# Patient Record
Sex: MALE | Race: WHITE | NOT HISPANIC OR LATINO | Employment: OTHER | URBAN - METROPOLITAN AREA
[De-identification: names, ages, dates, MRNs, and addresses within clinical notes are randomized per-mention and may not be internally consistent; named-entity substitution may affect disease eponyms.]

---

## 2017-02-14 ENCOUNTER — ALLSCRIPTS OFFICE VISIT (OUTPATIENT)
Dept: OTHER | Facility: OTHER | Age: 52
End: 2017-02-14

## 2017-02-14 DIAGNOSIS — E78.5 HYPERLIPIDEMIA: ICD-10-CM

## 2017-06-08 ENCOUNTER — APPOINTMENT (EMERGENCY)
Dept: RADIOLOGY | Facility: HOSPITAL | Age: 52
End: 2017-06-08
Payer: COMMERCIAL

## 2017-06-08 ENCOUNTER — HOSPITAL ENCOUNTER (EMERGENCY)
Facility: HOSPITAL | Age: 52
Discharge: LEFT BEFORE DISCHARGE | End: 2017-06-08
Attending: EMERGENCY MEDICINE
Payer: COMMERCIAL

## 2017-06-08 ENCOUNTER — GENERIC CONVERSION - ENCOUNTER (OUTPATIENT)
Dept: OTHER | Facility: OTHER | Age: 52
End: 2017-06-08

## 2017-06-08 VITALS
BODY MASS INDEX: 22.95 KG/M2 | DIASTOLIC BLOOD PRESSURE: 68 MMHG | RESPIRATION RATE: 18 BRPM | HEART RATE: 92 BPM | TEMPERATURE: 98.3 F | WEIGHT: 155.4 LBS | SYSTOLIC BLOOD PRESSURE: 130 MMHG | OXYGEN SATURATION: 98 %

## 2017-06-08 DIAGNOSIS — I20.0 UNSTABLE ANGINA (HCC): Primary | ICD-10-CM

## 2017-06-08 DIAGNOSIS — R07.9 CHEST PAIN: ICD-10-CM

## 2017-06-08 LAB
ANION GAP SERPL CALCULATED.3IONS-SCNC: 10 MMOL/L (ref 4–13)
APTT PPP: 31 SECONDS (ref 23–35)
ATRIAL RATE: 82 BPM
BASOPHILS # BLD AUTO: 0.02 THOUSANDS/ΜL (ref 0–0.1)
BASOPHILS NFR BLD AUTO: 0 % (ref 0–1)
BUN SERPL-MCNC: 14 MG/DL (ref 5–25)
CALCIUM SERPL-MCNC: 9.1 MG/DL (ref 8.3–10.1)
CHLORIDE SERPL-SCNC: 104 MMOL/L (ref 100–108)
CO2 SERPL-SCNC: 28 MMOL/L (ref 21–32)
CREAT SERPL-MCNC: 0.98 MG/DL (ref 0.6–1.3)
EOSINOPHIL # BLD AUTO: 0.12 THOUSAND/ΜL (ref 0–0.61)
EOSINOPHIL NFR BLD AUTO: 2 % (ref 0–6)
ERYTHROCYTE [DISTWIDTH] IN BLOOD BY AUTOMATED COUNT: 12.5 % (ref 11.6–15.1)
GFR SERPL CREATININE-BSD FRML MDRD: >60 ML/MIN/1.73SQ M
GLUCOSE SERPL-MCNC: 104 MG/DL (ref 65–140)
HCT VFR BLD AUTO: 45.8 % (ref 36.5–49.3)
HGB BLD-MCNC: 16.6 G/DL (ref 12–17)
INR PPP: 1.11 (ref 0.86–1.16)
LYMPHOCYTES # BLD AUTO: 1.53 THOUSANDS/ΜL (ref 0.6–4.47)
LYMPHOCYTES NFR BLD AUTO: 27 % (ref 14–44)
MCH RBC QN AUTO: 32.1 PG (ref 26.8–34.3)
MCHC RBC AUTO-ENTMCNC: 36.2 G/DL (ref 31.4–37.4)
MCV RBC AUTO: 89 FL (ref 82–98)
MONOCYTES # BLD AUTO: 0.5 THOUSAND/ΜL (ref 0.17–1.22)
MONOCYTES NFR BLD AUTO: 9 % (ref 4–12)
NEUTROPHILS # BLD AUTO: 3.41 THOUSANDS/ΜL (ref 1.85–7.62)
NEUTS SEG NFR BLD AUTO: 62 % (ref 43–75)
NRBC BLD AUTO-RTO: 0 /100 WBCS
P AXIS: 79 DEGREES
PLATELET # BLD AUTO: 159 THOUSANDS/UL (ref 149–390)
PMV BLD AUTO: 11 FL (ref 8.9–12.7)
POTASSIUM SERPL-SCNC: 4.3 MMOL/L (ref 3.5–5.3)
PR INTERVAL: 160 MS
PROTHROMBIN TIME: 14.3 SECONDS (ref 12.1–14.4)
QRS AXIS: -5 DEGREES
QRSD INTERVAL: 78 MS
QT INTERVAL: 346 MS
QTC INTERVAL: 404 MS
RBC # BLD AUTO: 5.17 MILLION/UL (ref 3.88–5.62)
SODIUM SERPL-SCNC: 142 MMOL/L (ref 136–145)
SPECIMEN SOURCE: NORMAL
T WAVE AXIS: 84 DEGREES
TROPONIN I BLD-MCNC: 0 NG/ML (ref 0–0.08)
VENTRICULAR RATE: 82 BPM
WBC # BLD AUTO: 5.58 THOUSAND/UL (ref 4.31–10.16)

## 2017-06-08 PROCEDURE — 85610 PROTHROMBIN TIME: CPT | Performed by: EMERGENCY MEDICINE

## 2017-06-08 PROCEDURE — 36415 COLL VENOUS BLD VENIPUNCTURE: CPT

## 2017-06-08 PROCEDURE — 99285 EMERGENCY DEPT VISIT HI MDM: CPT

## 2017-06-08 PROCEDURE — 71020 HB CHEST X-RAY 2VW FRONTAL&LATL: CPT

## 2017-06-08 PROCEDURE — 93005 ELECTROCARDIOGRAM TRACING: CPT

## 2017-06-08 PROCEDURE — 85730 THROMBOPLASTIN TIME PARTIAL: CPT | Performed by: EMERGENCY MEDICINE

## 2017-06-08 PROCEDURE — 80048 BASIC METABOLIC PNL TOTAL CA: CPT

## 2017-06-08 PROCEDURE — 84484 ASSAY OF TROPONIN QUANT: CPT

## 2017-06-08 PROCEDURE — 85025 COMPLETE CBC W/AUTO DIFF WBC: CPT

## 2017-08-22 ENCOUNTER — ALLSCRIPTS OFFICE VISIT (OUTPATIENT)
Dept: OTHER | Facility: OTHER | Age: 52
End: 2017-08-22

## 2018-01-10 NOTE — RESULT NOTES
Verified Results  NM MYOCARDIAL PERFUSION SPECT (RX STRESS AND/OR REST) [id number] 02:56PM Lisy Poag     Test Name Result Flag Reference   NM MYOCARDIAL PERFUSION SPECT (RX STRESS AND/OR REST) (Report)     Kamila Floreso [address]  [place], [address]   [phone]     Rest/Stress Gated SPECT Myocardial Perfusion Imaging After Regadenoson     Study date: 2016     Patient: Carmela Valverde   MR number: [id number]   Account number: [de-identified]   : 1965   Age: 44 years   Gender: Male   Study date: 2016   Status: Outpatient   Location: Stress lab 139 100 mmHg 81 bpm mmHg   Height: 67 in   Weight: 153 lb   BP: /     Indications: Evaluation of known coronary artery disease  Diagnosis: R07 89 - Other chest pain     Primary Physician: Pamela Anders MD   RN: Emmett Gonzales RN BSN   Referring Physician: Gianni Iraheta MD   Group: [facility]   Report Prepared By[de-identified] Emmett Gonzales RN BSN   Interpreting Physician: Sarah Rick DO     IMPRESSIONS:   Abnormal study after pharmacologic vasodilation  There was evidence of prior   myocardial infarction of the apical anterior wall and the true apex with mild   breann-infarct ischemia of the apical anterior myocardial wall segment  Overall   left ventricular systolic function was preserved, with moderate-severe apical   anterior hypokinesis  SUMMARY     ECG CONCLUSIONS:   The stress ECG was negative for ischemia  PERFUSION IMAGING:   There was a small, severe, myocardial perfusion defect of the apical anterior   wall and the true apex, with mild reversibility of the apical anterior wall  GATED SPECT:   The calculated left ventricular ejection fraction was 46 %  Left ventricular ejection fraction was within normal limits by visual estimate  There was moderate to severely reduced myocardial thickening and motion of the   apical anterior wall of the left ventricle       HISTORY: The patient is a 59year old [facility] male  Chest pain status:   consistent with atypical angina, associated with dyspnea  Other symptoms:   palpitations  Coronary artery disease risk factors: dyslipidemia, hypertension,   former smoking, and family history of premature coronary artery disease  Cardiovascular history: coronary artery disease, prior myocardial infarction,   and congestive heart failure  Prior cardiovascular procedures: percutaneous   transluminal coronary angioplasty (on 2009)  Medications: an ACE inhibitor/ARB,   aspirin, and a lipid lowering agent  Previous test results: abnormal resting   echocardiogram      PHYSICAL EXAM: Baseline physical exam screening: normal lung exam      REST ECG: Normal sinus rhythm  Nonspecific ST and T wave abnormalities were   present  PROCEDURE: The study was performed in the stress lab  A regadenoson infusion   pharmacologic stress test was performed  Gated SPECT myocardial perfusion   imaging was performed after stress and at rest  Systolic blood pressure was 139   mmHg, at the start of the study  Diastolic blood pressure was 100 mmHg, at the   start of the study  The heart rate was 81 bpm, at the start of the study  IV   double checked  The patient also performed low level exercise  MEDICATIONS GIVEN: No medications or fluids given  STRESS RESULTS:     ECG CONCLUSIONS: The stress ECG was negative for ischemia  Arrhythmia during   stress: isolated premature ventricular beats  ISOTOPE ADMINISTRATION:   Resting isotope administration Stress isotope administration   Agent Tetrofosmin Tetrofosmin   Dose 29 8 mCi 30 2 mCi   Date 09-Feb-2016 09-Feb-2016   Injection time 13:36 10:45   Imaging time 14:13 11:15   Injection-image interval 37 min 30 min     The radiopharmaceutical was injected at the peak effect of pharmacologic   stress  MYOCARDIAL PERFUSION IMAGING:   The image quality was good   Left ventricular size was normal  The TID ratio was 1  12  !!ERROR!! End tag does not match the start tag 'DIV'  Error at or before   end tag  -  There was a small, severe, myocardial perfusion defect of the   apical anterior wall and the true apex, with mild reversibility of the apical   anterior wall  GATED SPECT:   The calculated left ventricular ejection fraction was 46 %  Left ventricular   ejection fraction was within normal limits by visual estimate  There was   moderate to severely reduced myocardial thickening and motion of the apical   anterior wall of the left ventricle       Prepared and electronically signed by     Florian Carr DO   Signed 09-Feb-2016 10:01:45

## 2018-01-13 VITALS
HEART RATE: 80 BPM | RESPIRATION RATE: 16 BRPM | WEIGHT: 154.25 LBS | BODY MASS INDEX: 24.21 KG/M2 | DIASTOLIC BLOOD PRESSURE: 70 MMHG | HEIGHT: 67 IN | SYSTOLIC BLOOD PRESSURE: 108 MMHG

## 2018-01-13 VITALS
SYSTOLIC BLOOD PRESSURE: 118 MMHG | DIASTOLIC BLOOD PRESSURE: 72 MMHG | HEIGHT: 67 IN | WEIGHT: 158 LBS | BODY MASS INDEX: 24.8 KG/M2 | HEART RATE: 82 BPM

## 2018-02-20 ENCOUNTER — OFFICE VISIT (OUTPATIENT)
Dept: CARDIOLOGY CLINIC | Facility: CLINIC | Age: 53
End: 2018-02-20
Payer: COMMERCIAL

## 2018-02-20 VITALS
DIASTOLIC BLOOD PRESSURE: 88 MMHG | SYSTOLIC BLOOD PRESSURE: 114 MMHG | WEIGHT: 156 LBS | BODY MASS INDEX: 21.84 KG/M2 | HEART RATE: 80 BPM | HEIGHT: 71 IN

## 2018-02-20 DIAGNOSIS — I10 ESSENTIAL HYPERTENSION: ICD-10-CM

## 2018-02-20 DIAGNOSIS — I25.2 OLD MYOCARDIAL INFARCT: ICD-10-CM

## 2018-02-20 DIAGNOSIS — I25.10 CORONARY ARTERY DISEASE INVOLVING NATIVE CORONARY ARTERY OF NATIVE HEART WITHOUT ANGINA PECTORIS: Primary | ICD-10-CM

## 2018-02-20 DIAGNOSIS — E78.2 MIXED HYPERLIPIDEMIA: ICD-10-CM

## 2018-02-20 PROCEDURE — 99213 OFFICE O/P EST LOW 20 MIN: CPT | Performed by: INTERNAL MEDICINE

## 2018-02-20 RX ORDER — EZETIMIBE 10 MG/1
TABLET ORAL
COMMUNITY
Start: 2018-01-21 | End: 2018-12-17 | Stop reason: SDUPTHER

## 2018-02-20 RX ORDER — QUETIAPINE FUMARATE 100 MG/1
100 TABLET, FILM COATED ORAL
COMMUNITY
Start: 2017-11-09 | End: 2018-02-20 | Stop reason: SDUPTHER

## 2018-02-20 RX ORDER — VILAZODONE HYDROCHLORIDE 40 MG/1
40 TABLET ORAL
COMMUNITY
Start: 2018-01-22

## 2018-02-20 NOTE — PROGRESS NOTES
Cardiology Follow Up    [facility]  1965  [id number]  [address] [place] [address] [address] [place] [address]    Reason for visit:  Six-month follow-up for CAD status post anterior MI with stenting of the left anterior descending artery in 2009  Patient also has hypertension and hyperlipidemia  1  Coronary artery disease involving native coronary artery of native heart without angina pectoris     2  Essential hypertension     3  Old myocardial infarct     4  Mixed hyperlipidemia         Interval History: He continues to get vague chest pain about 2x per month unrelated to activity  He has ongoing SANDHU which is about the same  He denies palpitation or edema  He does get occasional dyspnea  Patient Active Problem List   Diagnosis    Hypertension    Old myocardial infarct    Depressed    CAD (coronary artery disease)    Anxiety    Chest pain    Mixed hyperlipidemia     Past Medical History:   Diagnosis Date    Anxiety     CAD (coronary artery disease)     Depressed     Hypertension     MI (myocardial infarction)     2009     Social History     Social History    Marital status: /Civil Union     Spouse name: N/A    Number of children: N/A    Years of education: N/A     Occupational History    Not on file  Social History Main Topics    Smoking status: Former Smoker    Smokeless tobacco: Not on file      Comment: Cess x 6 years    Alcohol use No    Drug use: No    Sexual activity: Not on file     Other Topics Concern    Not on file     Social History Narrative    No narrative on file      Family History   Problem Relation Age of Onset    Heart attack Father     Heart disease Brother      Past Surgical History:   Procedure Laterality Date    ANGIOPLASTY  2009     LAD       Current Outpatient Prescriptions:     aspirin 81 MG tablet, Take 81 mg by mouth daily  , Disp: , Rfl:     atorvastatin (LIPITOR) 40 mg tablet, Take 40 mg by mouth , Disp: , Rfl:     gabapentin (NEURONTIN) 600 MG tablet, Take 600 mg by mouth 3 (three) times a day , Disp: , Rfl:     lisinopril (ZESTRIL) 40 mg tablet, Take 40 mg by mouth 2 (two) times a day , Disp: , Rfl:     LORazepam (ATIVAN) 1 mg tablet, Take 1 mg by mouth every 6 (six) hours as needed for anxiety  , Disp: , Rfl:     methocarbamol (ROBAXIN) 750 mg tablet, Take 750 mg by mouth 3 (three) times a day , Disp: , Rfl:     mirtazapine (REMERON) 45 MG tablet, Take 45 mg by mouth daily at bedtime  , Disp: , Rfl:     nitroglycerin (NITRO-TIME) 2 5 mg CR capsule, Take by mouth continuous as needed  , Disp: , Rfl:     omeprazole (PriLOSEC) 20 mg delayed release capsule, Take 20 mg by mouth 2 (two) times a day , Disp: , Rfl:     QUEtiapine (SEROquel) 100 mg tablet, Take 100 mg by mouth daily at bedtime  , Disp: , Rfl:     rOPINIRole (REQUIP) 1 mg tablet, Take 1 mg by mouth daily  , Disp: , Rfl:     traMADol (ULTRAM) 50 mg tablet, Take 50 mg by mouth 2 (two) times a day , Disp: , Rfl:     triamcinolone (KENALOG) 0 1 % cream, Apply topically 2 (two) times a day , Disp: , Rfl:     VIIBRYD 40 MG tablet, , Disp: , Rfl:     ZETIA 10 MG tablet, , Disp: , Rfl:   No Known Allergies    Review of Systems:  Review of Systems   Constitutional: Positive for fatigue  Respiratory: Positive for shortness of breath  Cardiovascular: Positive for chest pain  Negative for palpitations and leg swelling  Genitourinary: Negative for frequency  Musculoskeletal: Positive for arthralgias and back pain  Neurological: Positive for light-headedness  Physical Exam:  Vitals:    02/20/18 1604   BP: 114/88   BP Location: Left arm   Patient Position: Sitting   Cuff Size: Adult   Pulse: 80   Weight: 70 8 kg (156 lb)   Height: 5' 11" (1 803 m)     Physical Exam   Constitutional: He appears well-developed and well-nourished  No distress  HENT:   Head: Normocephalic and atraumatic     Mouth/Throat: Oropharynx is clear and moist    Eyes: Conjunctivae are normal  No scleral icterus  Neck: Neck supple  Normal carotid pulses and no JVD present  Carotid bruit is not present  No thyromegaly present  Cardiovascular: Normal rate and regular rhythm  Exam reveals no gallop and no friction rub  No murmur heard  Pulmonary/Chest: He has no wheezes  He has no rhonchi  He has no rales  Abdominal: Soft  He exhibits no mass  There is no hepatosplenomegaly  There is no tenderness  Musculoskeletal: He exhibits no edema  Discussion/Summary:  1  CAD status post remote apical myocardial infarction with residual ejection fraction of 45%  Stenting of the left anterior descending artery at that time  Negative Myoview stress test in 2016  No evidence for angina  Chest pain is clearly noncardiac  Continue aspirin long term  No beta blocker due to depression  2  Hypertension  Well controlled on lisinopril  3  Old myocardial infarction  See above comments  Near normal ejection fraction  No evidence for heart failure  4  Mixed hyperlipidemia  On atorvastatin and ezetimibe  LDL cholesterol about 70 on this regimen  Continue same      Follow-up 6 months    Stress testing 1 year     Elisabeth Liang MD

## 2018-08-21 ENCOUNTER — OFFICE VISIT (OUTPATIENT)
Dept: CARDIOLOGY CLINIC | Facility: CLINIC | Age: 53
End: 2018-08-21
Payer: COMMERCIAL

## 2018-08-21 VITALS
DIASTOLIC BLOOD PRESSURE: 72 MMHG | SYSTOLIC BLOOD PRESSURE: 128 MMHG | RESPIRATION RATE: 16 BRPM | WEIGHT: 157 LBS | HEART RATE: 82 BPM | HEIGHT: 70 IN | BODY MASS INDEX: 22.48 KG/M2

## 2018-08-21 DIAGNOSIS — E78.2 MIXED HYPERLIPIDEMIA: ICD-10-CM

## 2018-08-21 DIAGNOSIS — I25.10 CORONARY ARTERY DISEASE INVOLVING NATIVE HEART, ANGINA PRESENCE UNSPECIFIED, UNSPECIFIED VESSEL OR LESION TYPE: ICD-10-CM

## 2018-08-21 DIAGNOSIS — I25.10 CORONARY ARTERY DISEASE INVOLVING NATIVE HEART, ANGINA PRESENCE UNSPECIFIED, UNSPECIFIED VESSEL OR LESION TYPE: Primary | ICD-10-CM

## 2018-08-21 DIAGNOSIS — I10 ESSENTIAL HYPERTENSION: ICD-10-CM

## 2018-08-21 PROCEDURE — 99213 OFFICE O/P EST LOW 20 MIN: CPT | Performed by: INTERNAL MEDICINE

## 2018-08-21 PROCEDURE — 93000 ELECTROCARDIOGRAM COMPLETE: CPT | Performed by: INTERNAL MEDICINE

## 2018-08-21 RX ORDER — NITROGLYCERIN 0.4 MG/1
0.4 TABLET SUBLINGUAL
Qty: 25 TABLET | Refills: 3 | Status: SHIPPED | OUTPATIENT
Start: 2018-08-21 | End: 2019-11-19 | Stop reason: SDUPTHER

## 2018-08-21 NOTE — PROGRESS NOTES
Cardiology Follow Up    [facility]  1965  [id number]  [address] [facility] [id number] [phone]    Reason for visit:  Six-month follow-up for CAD status post anterior myocardial infarction with stenting of the left anterior descending artery in 2009  Also has hypertension and hyperlipidemia      1  Coronary artery disease involving native heart, angina presence unspecified, unspecified vessel or lesion type  POCT ECG   2  Essential hypertension     3  Mixed hyperlipidemia         Interval History: Since his last visit he does get the random atypical chest pain at times  This is not related to activity  It occurs about 1-2x per month and lasts 15 minutes  It does not occur with exertion  He does have ongoing SANDHU which is unchanged  He denies palpitations or edema  He does get some dizziness at times    Patient Active Problem List   Diagnosis    Hypertension    Old myocardial infarct    Depressed    CAD (coronary artery disease)    Anxiety    Chest pain    Mixed hyperlipidemia     Past Medical History:   Diagnosis Date    Anxiety     CAD (coronary artery disease)     Depressed     Hypertension     MI [facility]myocardial infarction) ([facility] [address] )     2009     Social History     Social History    Marital status: /Civil Union     Spouse name: N/A    Number of children: N/A    Years of education: N/A     Occupational History    Not on file       Social History Main Topics    Smoking status: Former Smoker    Smokeless tobacco: Never Used      Comment: Cess x 6 years    Alcohol use No    Drug use: No    Sexual activity: Not on file     Other Topics Concern    Not on file     Social History Narrative    No narrative on file      Family History   Problem Relation Age of Onset    Heart attack Father     Heart disease Brother      Past Surgical History:   Procedure Laterality Date    ANGIOPLASTY  2009 LAD       Current Outpatient Prescriptions:     aspirin 81 MG tablet, Take 81 mg by mouth daily  , Disp: , Rfl:     atorvastatin (LIPITOR) 40 mg tablet, Take 40 mg by mouth , Disp: , Rfl:     gabapentin (NEURONTIN) 600 MG tablet, Take 600 mg by mouth 3 (three) times a day , Disp: , Rfl:     lisinopril (ZESTRIL) 40 mg tablet, Take 40 mg by mouth 2 (two) times a day , Disp: , Rfl:     LORazepam (ATIVAN) 1 mg tablet, Take 1 mg by mouth every 6 (six) hours as needed for anxiety  , Disp: , Rfl:     methocarbamol (ROBAXIN) 750 mg tablet, Take 750 mg by mouth 3 (three) times a day , Disp: , Rfl:     mirtazapine (REMERON) 45 MG tablet, Take 45 mg by mouth daily at bedtime  , Disp: , Rfl:     nitroglycerin (NITRO-TIME) 2 5 mg CR capsule, Take by mouth continuous as needed  , Disp: , Rfl:     omeprazole (PriLOSEC) 20 mg delayed release capsule, Take 20 mg by mouth 2 (two) times a day , Disp: , Rfl:     QUEtiapine (SEROquel) 100 mg tablet, Take 100 mg by mouth daily at bedtime  , Disp: , Rfl:     rOPINIRole (REQUIP) 1 mg tablet, Take 1 mg by mouth daily  , Disp: , Rfl:     traMADol (ULTRAM) 50 mg tablet, Take 50 mg by mouth 2 (two) times a day , Disp: , Rfl:     triamcinolone (KENALOG) 0 1 % cream, Apply topically 2 (two) times a day , Disp: , Rfl:     VIIBRYD 40 MG tablet, , Disp: , Rfl:     ZETIA 10 MG tablet, , Disp: , Rfl:   No Known Allergies      Review of Systems:  Review of Systems   Constitutional: Positive for fatigue  Negative for appetite change and unexpected weight change  Respiratory: Positive for shortness of breath  Negative for cough and wheezing  Cardiovascular: Positive for chest pain  Negative for palpitations and leg swelling  Gastrointestinal: Positive for diarrhea (from fish oil    now stopped)  Negative for constipation  Genitourinary: Negative for dysuria and frequency  Musculoskeletal: Positive for arthralgias and back pain  Neurological: Positive for light-headedness  Physical Exam:  Vitals:    08/21/18 1600   BP: 128/72   BP Location: Right arm   Patient Position: Sitting   Cuff Size: Standard   Pulse: 82   Resp: 16   Weight: 71 2 kg (157 lb)   Height: 5' 10" (1 778 m)       Physical Exam   Constitutional: He appears well-developed and well-nourished  No distress  HENT:   Head: Normocephalic and atraumatic  Mouth/Throat: Oropharynx is clear and moist    Eyes: Conjunctivae are normal  No scleral icterus  Neck: Neck supple  Normal carotid pulses and no JVD present  Carotid bruit is not present  No thyromegaly present  Cardiovascular: Normal rate and regular rhythm  Exam reveals no gallop and no friction rub  No murmur heard  Pulmonary/Chest: He has no wheezes  He has no rhonchi  He has no rales  Abdominal: Soft  He exhibits no mass  There is no hepatosplenomegaly  There is no tenderness  Musculoskeletal: He exhibits no edema  Discussion/Summary:  1  CAD  Status post remote myocardial infarction in 2009 with stenting of the left anterior descending artery  Having no angina  Chest pain reported clearly atypical and not angina  Continue  Aspirin going forward  No beta-blocker on board despite modest scarring due to hx of depression  Check [place] Myoview in 6  Months-3 yrs out from last test  2  Hypertension -well controlled on monotherapy with lisinopril  Continue same  3  Mixed hyperlipidemia  LDL cholesterol excellent at 55 with low HDL cholesterol of 22  Continue atorvastatin and ezetimibe combination  Patient did have rather high triglycerides at 384 which is unusual for him  Note he does have some glucose intolerance and should do better with observe ends of a low starch/ no concentrated sweets diet  He did try fish oil but had diarrhea and GI upset from 1-2 tablets daily  If this elevation persist would consider adding a fibrate such as fenofibrate  At a low dose    Phone FU after stress test in 6 months      Sushma Higgins MD      2

## 2018-08-22 RX ORDER — NITROGLYCERIN 0.4 MG/1
TABLET SUBLINGUAL
Qty: 75 TABLET | Refills: 3 | OUTPATIENT
Start: 2018-08-22

## 2018-10-29 ENCOUNTER — HOSPITAL ENCOUNTER (EMERGENCY)
Facility: HOSPITAL | Age: 53
Discharge: HOME | End: 2018-10-29
Attending: EMERGENCY MEDICINE | Admitting: EMERGENCY MEDICINE
Payer: COMMERCIAL

## 2018-10-29 ENCOUNTER — APPOINTMENT (EMERGENCY)
Dept: RADIOLOGY | Facility: HOSPITAL | Age: 53
End: 2018-10-29
Payer: COMMERCIAL

## 2018-10-29 ENCOUNTER — APPOINTMENT (EMERGENCY)
Dept: ULTRASOUND IMAGING | Facility: HOSPITAL | Age: 53
End: 2018-10-29
Payer: COMMERCIAL

## 2018-10-29 VITALS
RESPIRATION RATE: 16 BRPM | TEMPERATURE: 99.3 F | WEIGHT: 157 LBS | OXYGEN SATURATION: 98 % | DIASTOLIC BLOOD PRESSURE: 79 MMHG | HEART RATE: 82 BPM | SYSTOLIC BLOOD PRESSURE: 160 MMHG | BODY MASS INDEX: 22.53 KG/M2

## 2018-10-29 DIAGNOSIS — R59.9 ADENOPATHY: Primary | ICD-10-CM

## 2018-10-29 LAB
ALBUMIN SERPL BCP-MCNC: 4.1 G/DL (ref 3.5–5)
ALP SERPL-CCNC: 70 U/L (ref 46–116)
ALT SERPL W P-5'-P-CCNC: 38 U/L (ref 12–78)
ANION GAP SERPL CALCULATED.3IONS-SCNC: 6 MMOL/L (ref 4–13)
APTT PPP: 33 SECONDS (ref 24–36)
AST SERPL W P-5'-P-CCNC: 18 U/L (ref 5–45)
BASOPHILS # BLD AUTO: 0.06 THOUSANDS/ΜL (ref 0–0.1)
BASOPHILS NFR BLD AUTO: 1 % (ref 0–1)
BILIRUB SERPL-MCNC: 0.68 MG/DL (ref 0.2–1)
BUN SERPL-MCNC: 14 MG/DL (ref 5–25)
CALCIUM SERPL-MCNC: 9.3 MG/DL (ref 8.3–10.1)
CHLORIDE SERPL-SCNC: 103 MMOL/L (ref 100–108)
CO2 SERPL-SCNC: 30 MMOL/L (ref 21–32)
CREAT SERPL-MCNC: 0.89 MG/DL (ref 0.6–1.3)
EOSINOPHIL # BLD AUTO: 0.15 THOUSAND/ΜL (ref 0–0.61)
EOSINOPHIL NFR BLD AUTO: 2 % (ref 0–6)
ERYTHROCYTE [DISTWIDTH] IN BLOOD BY AUTOMATED COUNT: 12.1 % (ref 11.6–15.1)
GFR SERPL CREATININE-BSD FRML MDRD: 97 ML/MIN/1.73SQ M
GLUCOSE SERPL-MCNC: 109 MG/DL (ref 65–140)
HCT VFR BLD AUTO: 43.9 % (ref 36.5–49.3)
HGB BLD-MCNC: 15.2 G/DL (ref 12–17)
IMM GRANULOCYTES # BLD AUTO: 0.04 THOUSAND/UL (ref 0–0.2)
IMM GRANULOCYTES NFR BLD AUTO: 1 % (ref 0–2)
INR PPP: 1.12 (ref 0.86–1.17)
LYMPHOCYTES # BLD AUTO: 1.55 THOUSANDS/ΜL (ref 0.6–4.47)
LYMPHOCYTES NFR BLD AUTO: 21 % (ref 14–44)
MCH RBC QN AUTO: 30.8 PG (ref 26.8–34.3)
MCHC RBC AUTO-ENTMCNC: 34.6 G/DL (ref 31.4–37.4)
MCV RBC AUTO: 89 FL (ref 82–98)
MONOCYTES # BLD AUTO: 0.74 THOUSAND/ΜL (ref 0.17–1.22)
MONOCYTES NFR BLD AUTO: 10 % (ref 4–12)
NEUTROPHILS # BLD AUTO: 4.83 THOUSANDS/ΜL (ref 1.85–7.62)
NEUTS SEG NFR BLD AUTO: 65 % (ref 43–75)
NRBC BLD AUTO-RTO: 0 /100 WBCS
PLATELET # BLD AUTO: 179 THOUSANDS/UL (ref 149–390)
PMV BLD AUTO: 9.7 FL (ref 8.9–12.7)
POTASSIUM SERPL-SCNC: 4.4 MMOL/L (ref 3.5–5.3)
PROT SERPL-MCNC: 7.6 G/DL (ref 6.4–8.2)
PROTHROMBIN TIME: 14.5 SECONDS (ref 11.8–14.2)
RBC # BLD AUTO: 4.93 MILLION/UL (ref 3.88–5.62)
SODIUM SERPL-SCNC: 139 MMOL/L (ref 136–145)
TROPONIN I SERPL-MCNC: <0.02 NG/ML
WBC # BLD AUTO: 7.37 THOUSAND/UL (ref 4.31–10.16)

## 2018-10-29 PROCEDURE — 76882 US LMTD JT/FCL EVL NVASC XTR: CPT

## 2018-10-29 PROCEDURE — 36415 COLL VENOUS BLD VENIPUNCTURE: CPT

## 2018-10-29 PROCEDURE — 93005 ELECTROCARDIOGRAM TRACING: CPT

## 2018-10-29 PROCEDURE — 80053 COMPREHEN METABOLIC PANEL: CPT | Performed by: EMERGENCY MEDICINE

## 2018-10-29 PROCEDURE — 84484 ASSAY OF TROPONIN QUANT: CPT | Performed by: EMERGENCY MEDICINE

## 2018-10-29 PROCEDURE — 99285 EMERGENCY DEPT VISIT HI MDM: CPT

## 2018-10-29 PROCEDURE — 71046 X-RAY EXAM CHEST 2 VIEWS: CPT

## 2018-10-29 PROCEDURE — 85025 COMPLETE CBC W/AUTO DIFF WBC: CPT | Performed by: EMERGENCY MEDICINE

## 2018-10-29 PROCEDURE — 85730 THROMBOPLASTIN TIME PARTIAL: CPT | Performed by: EMERGENCY MEDICINE

## 2018-10-29 PROCEDURE — 85610 PROTHROMBIN TIME: CPT | Performed by: EMERGENCY MEDICINE

## 2018-10-29 PROCEDURE — 87040 BLOOD CULTURE FOR BACTERIA: CPT | Performed by: EMERGENCY MEDICINE

## 2018-10-29 RX ORDER — CEPHALEXIN 500 MG/1
500 CAPSULE ORAL 4 TIMES DAILY
Qty: 28 CAPSULE | Refills: 0 | Status: SHIPPED | OUTPATIENT
Start: 2018-10-29 | End: 2018-11-01 | Stop reason: SDUPTHER

## 2018-10-29 RX ORDER — IBUPROFEN 600 MG/1
600 TABLET ORAL EVERY 8 HOURS PRN
Qty: 30 TABLET | Refills: 0 | Status: SHIPPED | OUTPATIENT
Start: 2018-10-29 | End: 2021-12-28 | Stop reason: HOSPADM

## 2018-10-29 RX ORDER — LORAZEPAM 1 MG/1
1 TABLET ORAL ONCE
Status: COMPLETED | OUTPATIENT
Start: 2018-10-29 | End: 2018-10-29

## 2018-10-29 RX ADMIN — LORAZEPAM 1 MG: 1 TABLET ORAL at 14:30

## 2018-10-29 NOTE — ED PROVIDER NOTES
History  Chief Complaint   Patient presents with    Chest Pain     Started 2 days ago, developed chest pressure   Abscess     In right axilla       History provided by:  Patient   used: No    Medical Problem - Major   Location:  Swelling in the right axilla  Severity:  Moderate  Onset quality:  Sudden  Duration: Several days  Timing:  Constant  Progression:  Worsening  Chronicity:  New  Context:  Low-grade fever noticed some swelling and pain in the right axilla  No rash  No trauma  Denies having any other adenopathy  No sweats  No weight loss  Relieved by:  Nothing  Worsened by:  Palpation  Ineffective treatments:  None tried  Associated symptoms: chest pain, fever and myalgias    Associated symptoms: no abdominal pain, no congestion, no cough, no diarrhea, no headaches, no nausea, no rash, no rhinorrhea, no shortness of breath, no sore throat and no vomiting     The chest pain seems to be centered around the right axilla  Prior to Admission Medications   Prescriptions Last Dose Informant Patient Reported? Taking? LORazepam (ATIVAN) 1 mg tablet  Self Yes Yes   Sig: Take 1 mg by mouth every 6 (six) hours as needed for anxiety  QUEtiapine (SEROquel) 100 mg tablet  Self Yes Yes   Sig: Take 100 mg by mouth daily at bedtime  VIIBRYD 40 MG tablet  Self Yes Yes   ZETIA 10 MG tablet  Self Yes Yes   aspirin 81 MG tablet  Self Yes Yes   Sig: Take 81 mg by mouth daily  atorvastatin (LIPITOR) 40 mg tablet  Self Yes Yes   Sig: Take 40 mg by mouth    gabapentin (NEURONTIN) 600 MG tablet  Self Yes Yes   Sig: Take 600 mg by mouth 3 (three) times a day  lisinopril (ZESTRIL) 40 mg tablet  Self Yes Yes   Sig: Take 40 mg by mouth 2 (two) times a day  methocarbamol (ROBAXIN) 750 mg tablet  Self Yes Yes   Sig: Take 750 mg by mouth 3 (three) times a day  mirtazapine (REMERON) 45 MG tablet  Self Yes Yes   Sig: Take 45 mg by mouth daily at bedtime     nitroglycerin (NITROSTAT) 0 4 mg SL tablet   No Yes   Sig: Place 1 tablet (0 4 mg total) under the tongue every 5 (five) minutes as needed for chest pain   omeprazole (PriLOSEC) 20 mg delayed release capsule  Self Yes Yes   Sig: Take 20 mg by mouth 2 (two) times a day  rOPINIRole (REQUIP) 1 mg tablet  Self Yes Yes   Sig: Take 1 mg by mouth daily  traMADol (ULTRAM) 50 mg tablet  Self Yes Yes   Sig: Take 50 mg by mouth 2 (two) times a day  triamcinolone (KENALOG) 0 1 % cream  Self Yes Yes   Sig: Apply topically 2 (two) times a day  Facility-Administered Medications: None       Past Medical History:   Diagnosis Date    Anxiety     CAD (coronary artery disease)     Depressed     Hypertension     MI (myocardial infarction) ([facility] [address] )     2009       Past Surgical History:   Procedure Laterality Date    ANGIOPLASTY  2009     LAD       Family History   Problem Relation Age of Onset    Heart attack Father     Heart disease Brother      I have reviewed and agree with the history as documented  Social History   Substance Use Topics    Smoking status: Former Smoker    Smokeless tobacco: Never Used      Comment: Cess x 6 years    Alcohol use No        Review of Systems   Constitutional: Positive for fever  Negative for chills  HENT: Negative for congestion, rhinorrhea and sore throat  Respiratory: Negative for cough, chest tightness and shortness of breath  Cardiovascular: Positive for chest pain  Gastrointestinal: Negative for abdominal pain, diarrhea, nausea and vomiting  Musculoskeletal: Positive for myalgias  Negative for arthralgias, neck pain and neck stiffness  Skin: Negative for color change and rash  Neurological: Negative for weakness and headaches  Hematological: Positive for adenopathy  Right axilla only   All other systems reviewed and are negative  Physical Exam  Physical Exam   Constitutional: He appears well-developed and well-nourished  He is cooperative  Non-toxic appearance   He does not have a sickly appearance  He does not appear ill  No distress  HENT:   Head: Normocephalic and atraumatic  Right Ear: Hearing normal  No drainage or swelling  Left Ear: Hearing normal  No drainage or swelling  Mouth/Throat: Mucous membranes are normal    Eyes: Conjunctivae and lids are normal  Right eye exhibits no discharge  Left eye exhibits no discharge  Neck: Trachea normal and normal range of motion  Neck supple  No JVD present  Cardiovascular: Normal rate, regular rhythm, normal heart sounds, intact distal pulses and normal pulses  Exam reveals no gallop and no friction rub  No murmur heard  Pulmonary/Chest: Effort normal and breath sounds normal  No stridor  No respiratory distress  He has no wheezes  He has no rales  Abdominal: Soft  Normal appearance  He exhibits no ascites and no mass  There is no hepatosplenomegaly  There is no tenderness  There is no rebound, no guarding and no CVA tenderness  Musculoskeletal: Normal range of motion  He exhibits no edema or tenderness  Soft tissue swelling in the right axilla feels like a lymph node enlargement  Lymphadenopathy:     He has no cervical adenopathy  Right: No inguinal adenopathy present  Left: No inguinal adenopathy present  Neurological: He is alert  He has normal strength  No sensory deficit  He exhibits normal muscle tone  Gait normal  GCS eye subscore is 4  GCS verbal subscore is 5  GCS motor subscore is 6  Skin: Skin is warm, dry and intact  No rash noted  He is not diaphoretic  No pallor  Psychiatric: He has a normal mood and affect  His speech is normal  Cognition and memory are normal    Nursing note and vitals reviewed        Vital Signs  ED Triage Vitals   Temperature Pulse Respirations Blood Pressure SpO2   10/29/18 1153 10/29/18 1153 10/29/18 1153 10/29/18 1153 10/29/18 1153   99 3 °F (37 4 °C) 93 20 148/76 98 %      Temp Source Heart Rate Source Patient Position - Orthostatic VS BP Location FiO2 (%) 10/29/18 1153 10/29/18 1335 10/29/18 1153 10/29/18 1153 --   Oral Monitor Sitting Right arm       Pain Score       10/29/18 1153       5           Vitals:    10/29/18 1153 10/29/18 1335 10/29/18 1439 10/29/18 1557   BP: 148/76 160/80 146/80 160/79   Pulse: 93 84 82 82   Patient Position - Orthostatic VS: Sitting Lying Lying Lying       Visual Acuity      ED Medications  Medications   LORazepam (ATIVAN) tablet 1 mg (1 mg Oral Given 10/29/18 1430)       Diagnostic Studies  Results Reviewed     Procedure Component Value Units Date/Time    Blood culture #1 [[id number]] Collected:  10/29/18 1435    Lab Status: In process Specimen:  Blood from Arm, Left Updated:  10/29/18 1438    Blood culture #2 [[id number]] Collected:  10/29/18 1427    Lab Status: In process Specimen:  Blood from Arm, Right Updated:  10/29/18 1430    Troponin I [[id number]]  (Normal) Collected:  10/29/18 1200    Lab Status:  Final result Specimen:  Blood from Arm, Right Updated:  10/29/18 1231     Troponin I <0 02 ng/mL     Comprehensive metabolic panel [[id number]] Collected:  10/29/18 1200    Lab Status:  Final result Specimen:  Blood from Arm, Right Updated:  10/29/18 1227     Sodium 139 mmol/L      Potassium 4 4 mmol/L      Chloride 103 mmol/L      CO2 30 mmol/L      ANION GAP 6 mmol/L      BUN 14 mg/dL      Creatinine 0 89 mg/dL      Glucose 109 mg/dL      Calcium 9 3 mg/dL      AST 18 U/L      ALT 38 U/L      Alkaline Phosphatase 70 U/L      Total Protein 7 6 g/dL      Albumin 4 1 g/dL      Total Bilirubin 0 68 mg/dL      eGFR 97 ml/min/1 73sq m     Narrative:         National Kidney Disease Education Program recommendations are as follows:  GFR calculation is accurate only with a steady state creatinine  Chronic Kidney disease less than 60 ml/min/1 73 sq  meters  Kidney failure less than 15 ml/min/1 73 sq  meters      APTT [[id number]]  (Normal) Collected:  10/29/18 1200    Lab Status:  Final result Specimen:  Blood from Arm, Right Updated:  10/29/18 1224     PTT 33 seconds     Protime-INR [[id number]]  (Abnormal) Collected:  10/29/18 1200    Lab Status:  Final result Specimen:  Blood from Arm, Right Updated:  10/29/18 1224     Protime 14 5 (H) seconds      INR 1 12    CBC and differential [[id number]] Collected:  10/29/18 1200    Lab Status:  Final result Specimen:  Blood from Arm, Right Updated:  10/29/18 1212     WBC 7 37 Thousand/uL      RBC 4 93 Million/uL      Hemoglobin 15 2 g/dL      Hematocrit 43 9 %      MCV 89 fL      MCH 30 8 pg      MCHC 34 6 g/dL      RDW 12 1 %      MPV 9 7 fL      Platelets 765 Thousands/uL      nRBC 0 /100 WBCs      Neutrophils Relative 65 %      Immat GRANS % 1 %      Lymphocytes Relative 21 %      Monocytes Relative 10 %      Eosinophils Relative 2 %      Basophils Relative 1 %      Neutrophils Absolute 4 83 Thousands/µL      Immature Grans Absolute 0 04 Thousand/uL      Lymphocytes Absolute 1 55 Thousands/µL      Monocytes Absolute 0 74 Thousand/µL      Eosinophils Absolute 0 15 Thousand/µL      Basophils Absolute 0 06 Thousands/µL                  XR chest 2 views   ED Interpretation by Patience Bill MD (10/29 1555)   I have personally reviewed the x-ray and my findings are: no acute disease  Final Result by Ilene Jain MD (10/29 1607)      No acute cardiopulmonary disease  Workstation performed: [id number]         US extremity soft tissue   Final Result by Emil Varma DO (10/29 1600)   Enlarged ovoid very hypoechoic but apparently solid structure in the right axilla with internal vascularity, likely an enlarged inflamed/edematous lymph node  There is a nearby prominent but not pathologically enlarged lymph node  Follow-up ultrasound    recommended in 2-3 weeks following treatment with biopsy recommended if this does not promptly improve/resolve  Additional prominent but not pathologically enlarged right axillary lymph nodes        Workstation performed: [id number] Procedures  Procedures       Phone Contacts  ED Phone Contact    ED Course                               MDM  Number of Diagnoses or Management Options  Adenopathy:   Diagnosis management comments: Low-grade fever  Ultrasound reveals right axillary adenopathy  Will place on Keflex and will need to follow up with primary care doctor for resolution  No obvious signs of any infectious disease presently and I find no other areas of adenopathy  Amount and/or Complexity of Data Reviewed  Clinical lab tests: reviewed and ordered  Tests in the radiology section of CPT®: ordered and reviewed  Tests in the medicine section of CPT®: reviewed and ordered  Independent visualization of images, tracings, or specimens: yes      CritCare Time    Disposition  Final diagnoses:   Adenopathy - right axilla     Time reflects when diagnosis was documented in both MDM as applicable and the Disposition within this note     Time User Action Codes Description Comment    10/29/2018  4:39 PM Brianna Farah [R59 1] Adenopathy     10/29/2018  4:39 PM Mihir Leal [R59 1] Adenopathy right axilla      ED Disposition     ED Disposition Condition Comment    Discharge  [facility] discharge to home/self care  Condition at discharge: Good        Follow-up Information     Follow up With Specialties Details Why Contact Nilda Rosado,  Family Medicine Schedule an appointment as soon as possible for a visit in 1 week You need follow-up with your medical doctor in 1 week to re-evaluate these lymph nodes  You may need a repeat ultrasound or follow-up with a surgeon if this does not resolve or gets worse  [address] [place] [id number]  [phone]      Kaye Aiken MD General Surgery Schedule an appointment as soon as possible for a visit in 1 week If the lymph node problem gets worse or does not resolve   [address] [place] [id number]  [phone]            Discharge Medication List as of 10/29/2018  4:43 PM      START taking these medications    Details   cephalexin (KEFLEX) 500 mg capsule Take 1 capsule (500 mg total) by mouth 4 (four) times a day for 7 days, Starting Mon 10/29/2018, Until Mon 11/5/2018, Print      ibuprofen (MOTRIN) 600 mg tablet Take 1 tablet (600 mg total) by mouth every 8 (eight) hours as needed for mild pain for up to 10 days, Starting Mon 10/29/2018, Until Thu 11/8/2018, Print         CONTINUE these medications which have NOT CHANGED    Details   aspirin 81 MG tablet Take 81 mg by mouth daily  , Historical Med      atorvastatin (LIPITOR) 40 mg tablet Take 40 mg by mouth , Historical Med      gabapentin (NEURONTIN) 600 MG tablet Take 600 mg by mouth 3 (three) times a day , Historical Med      lisinopril (ZESTRIL) 40 mg tablet Take 40 mg by mouth 2 (two) times a day , Historical Med      LORazepam (ATIVAN) 1 mg tablet Take 1 mg by mouth every 6 (six) hours as needed for anxiety  , Historical Med      methocarbamol (ROBAXIN) 750 mg tablet Take 750 mg by mouth 3 (three) times a day , Historical Med      mirtazapine (REMERON) 45 MG tablet Take 45 mg by mouth daily at bedtime  , Historical Med      nitroglycerin (NITROSTAT) 0 4 mg SL tablet Place 1 tablet (0 4 mg total) under the tongue every 5 (five) minutes as needed for chest pain, Starting Tue 8/21/2018, Normal      omeprazole (PriLOSEC) 20 mg delayed release capsule Take 20 mg by mouth 2 (two) times a day , Historical Med      QUEtiapine (SEROquel) 100 mg tablet Take 100 mg by mouth daily at bedtime  , Historical Med      rOPINIRole (REQUIP) 1 mg tablet Take 1 mg by mouth daily  , Historical Med      traMADol (ULTRAM) 50 mg tablet Take 50 mg by mouth 2 (two) times a day , Historical Med      triamcinolone (KENALOG) 0 1 % cream Apply topically 2 (two) times a day , Historical Med      VIIBRYD 40 MG tablet Starting Mon 1/22/2018, Historical Med      ZETIA 10 MG tablet Starting Sun 1/21/2018, Historical Med           No discharge procedures on file      ED Provider  Electronically Signed by           Nnamdi Carver MD  10/29/18 5565

## 2018-10-30 LAB
ATRIAL RATE: 91 BPM
ATRIAL RATE: 91 BPM
P AXIS: 71 DEGREES
P AXIS: 71 DEGREES
PR INTERVAL: 152 MS
PR INTERVAL: 152 MS
QRS AXIS: -89 DEGREES
QRS AXIS: -89 DEGREES
QRSD INTERVAL: 82 MS
QRSD INTERVAL: 82 MS
QT INTERVAL: 362 MS
QT INTERVAL: 362 MS
QTC INTERVAL: 445 MS
QTC INTERVAL: 445 MS
T WAVE AXIS: 36 DEGREES
T WAVE AXIS: 36 DEGREES
VENTRICULAR RATE: 91 BPM
VENTRICULAR RATE: 91 BPM

## 2018-10-30 PROCEDURE — 93010 ELECTROCARDIOGRAM REPORT: CPT | Performed by: INTERNAL MEDICINE

## 2018-11-01 ENCOUNTER — OFFICE VISIT (OUTPATIENT)
Dept: SURGERY | Facility: CLINIC | Age: 53
End: 2018-11-01
Payer: COMMERCIAL

## 2018-11-01 VITALS
HEART RATE: 89 BPM | RESPIRATION RATE: 18 BRPM | SYSTOLIC BLOOD PRESSURE: 138 MMHG | WEIGHT: 154 LBS | HEIGHT: 70 IN | DIASTOLIC BLOOD PRESSURE: 84 MMHG | BODY MASS INDEX: 22.05 KG/M2 | TEMPERATURE: 97.8 F

## 2018-11-01 DIAGNOSIS — R59.9 ADENOPATHY: ICD-10-CM

## 2018-11-01 DIAGNOSIS — R22.31 MASS OF RIGHT AXILLA: Primary | ICD-10-CM

## 2018-11-01 PROCEDURE — 99203 OFFICE O/P NEW LOW 30 MIN: CPT | Performed by: PHYSICIAN ASSISTANT

## 2018-11-01 RX ORDER — MIRTAZAPINE 45 MG/1
45 TABLET, FILM COATED ORAL
COMMUNITY
Start: 2018-11-01 | End: 2018-11-19 | Stop reason: SDUPTHER

## 2018-11-01 RX ORDER — QUETIAPINE FUMARATE 100 MG/1
100 TABLET, FILM COATED ORAL
COMMUNITY
Start: 2018-11-01 | End: 2018-11-19 | Stop reason: SDUPTHER

## 2018-11-01 RX ORDER — CEPHALEXIN 500 MG/1
500 CAPSULE ORAL 4 TIMES DAILY
Qty: 28 CAPSULE | Refills: 0 | Status: SHIPPED | OUTPATIENT
Start: 2018-11-01 | End: 2018-11-08

## 2018-11-01 RX ORDER — LORAZEPAM 2 MG/1
1 TABLET ORAL 2 TIMES DAILY
COMMUNITY
Start: 2018-11-01 | End: 2018-11-19 | Stop reason: DRUGHIGH

## 2018-11-01 RX ORDER — CEPHALEXIN 500 MG/1
CAPSULE ORAL
COMMUNITY
Start: 2018-10-29 | End: 2018-11-15 | Stop reason: SDUPTHER

## 2018-11-01 RX ORDER — VILAZODONE HYDROCHLORIDE 40 MG/1
40 TABLET ORAL
COMMUNITY
Start: 2018-11-01 | End: 2018-11-19 | Stop reason: SDUPTHER

## 2018-11-01 NOTE — PROGRESS NOTES
Assessment/Plan:   Justin Reyna is a 45 y o male who is here for The encounter diagnosis was Mass of right axilla  45 y o  Male with mass of right axilla for two weeks  Mass is tender to touch  Associated with fevers and sweats  No trauma or injury  Patient seen in the ED two days ago and started on oral antibiotics with minimal improvement    US with ovoid structure in right axilla measuring 3 x 2 6 x1 6 with internal vascularity suggesting a solid mass  Plan: Continue antibiotics as prescribed  Follow up [address] in 3 - weeks  Return to office if change or worsening of symptoms  If no improvement may need biopsy of mass         Preoperative Clearance: None        - Patient has been instructed to avoid herbs or non-directed vitamins the week prior to surgery to ensure no drug interactions with perioperative surgical and anesthetic medications  - Patient should continue antihypertensive medications up through and including the day of surgery  - Patient should continue beta-blocker medication up through and including the day of surgery  - Patient should continue his statin medication up through and including the day of surgery  - Patient has been instructed to avoid aspirin containing medications or non-steroidal anti-inflammatory drugs for the week preceding surgery  ______________________________________________________  CC:Abscess (Right Axilla x2wks)    HPI:  Justin Reyna is a 45 y o male who was referred for evaluation of Abscess (Right Axilla x2wks)    Currently mass and swelling of right axilla  Associated fevers and sweats   No injury or trauma to area      ROS:  General ROS: negative  negative for - chills, fatigue, fever or night sweats, weight loss  Respiratory ROS: no cough, shortness of breath, or wheezing  Cardiovascular ROS: no chest pain or dyspnea on exertion  Genito-Urinary ROS: no dysuria, trouble voiding, or hematuria  Musculoskeletal ROS: negative for - gait disturbance, joint pain or muscle pain  Neurological ROS: no TIA or stroke symptoms  Gastrointestinal: see HPI  No abdominal pain, melena, BRB unless specified in HPI  Skin ROS: no new rashes or lesions   Lymphatic ROS: no new adenopathy noted by pt  GYN ROS: see HPI, no new GYN history or bleeding noted  Psy ROS: no new mental or behavioral disturbances       Patient Active Problem List   Diagnosis    Hypertension    Old myocardial infarct    Depressed    CAD (coronary artery disease)    Anxiety    Chest pain    Mixed hyperlipidemia         Allergies:  Patient has no known allergies  Current Outpatient Prescriptions:     aspirin 81 MG tablet, Take 81 mg by mouth daily  , Disp: , Rfl:     atorvastatin (LIPITOR) 40 mg tablet, Take 40 mg by mouth , Disp: , Rfl:     cephalexin (KEFLEX) 500 mg capsule, Take 1 capsule (500 mg total) by mouth 4 (four) times a day for 7 days, Disp: 28 capsule, Rfl: 0    gabapentin (NEURONTIN) 600 MG tablet, Take 600 mg by mouth 3 (three) times a day , Disp: , Rfl:     ibuprofen (MOTRIN) 600 mg tablet, Take 1 tablet (600 mg total) by mouth every 8 (eight) hours as needed for mild pain for up to 10 days, Disp: 30 tablet, Rfl: 0    lisinopril (ZESTRIL) 40 mg tablet, Take 40 mg by mouth 2 (two) times a day , Disp: , Rfl:     LORazepam (ATIVAN) 2 mg tablet, Take 1 mg by mouth every 6 (six) hours, Disp: , Rfl:     methocarbamol (ROBAXIN) 750 mg tablet, Take 750 mg by mouth 3 (three) times a day , Disp: , Rfl:     mirtazapine (REMERON) 45 MG tablet, Take 45 mg by mouth daily at bedtime  , Disp: , Rfl:     nitroglycerin (NITROSTAT) 0 4 mg SL tablet, Place 1 tablet (0 4 mg total) under the tongue every 5 (five) minutes as needed for chest pain, Disp: 25 tablet, Rfl: 3    omeprazole (PriLOSEC) 20 mg delayed release capsule, Take 20 mg by mouth 2 (two) times a day , Disp: , Rfl:     QUEtiapine (SEROquel) 100 mg tablet, Take 100 mg by mouth daily at bedtime  , Disp: , Rfl:     rOPINIRole (REQUIP) 1 mg tablet, Take 1 mg by mouth daily  , Disp: , Rfl:     traMADol (ULTRAM) 50 mg tablet, Take 50 mg by mouth 2 (two) times a day , Disp: , Rfl:     triamcinolone (KENALOG) 0 1 % cream, Apply topically 2 (two) times a day , Disp: , Rfl:     vilazodone (VIIBRYD) 40 mg tablet, Take 40 mg by mouth, Disp: , Rfl:     ZETIA 10 MG tablet, , Disp: , Rfl:     cephalexin (KEFLEX) 500 mg capsule, TK 1 C PO QID FOR 7 DAYS, Disp: , Rfl:     LORazepam (ATIVAN) 1 mg tablet, Take 1 mg by mouth every 6 (six) hours as needed for anxiety  , Disp: , Rfl:     mirtazapine (REMERON) 45 MG tablet, Take 45 mg by mouth, Disp: , Rfl:     QUEtiapine (SEROquel) 100 mg tablet, Take 100 mg by mouth, Disp: , Rfl:     VIIBRYD 40 MG tablet, , Disp: , Rfl:     Past Medical History:   Diagnosis Date    Anxiety     CAD (coronary artery disease)     Depressed     Hypertension     MI (myocardial infarction) ([facility] [address] )     2009       Past Surgical History:   Procedure Laterality Date    ANGIOPLASTY  2009     LAD       Family History   Problem Relation Age of Onset    Heart attack Father     Heart disease Brother         reports that he has quit smoking  He has never used smokeless tobacco  He reports that he does not drink alcohol or use drugs  Labs:   Lab Results   Component Value Date    WBC 7 37 10/29/2018    HGB 15 2 10/29/2018    HCT 43 9 10/29/2018    MCV 89 10/29/2018     10/29/2018     Lab Results   Component Value Date     03/22/2014    K 4 4 10/29/2018     10/29/2018    CO2 30 10/29/2018    ANIONGAP 9 03/22/2014    BUN 14 10/29/2018    CREATININE 0 89 10/29/2018    GLUCOSE 92 03/22/2014    CALCIUM 9 3 10/29/2018    AST 18 10/29/2018    ALT 38 10/29/2018    ALKPHOS 70 10/29/2018    EGFR 97 10/29/2018         Imaging: No new pertinent imaging studies           PHYSICAL EXAM        PHYSICAL EXAM  General Appearance:    Alert, cooperative, no distress, healthy   Head:    Normocephalic without obvious abnormality Eyes:    PERRL, conjunctiva/corneas clear, EOM's intact        Neck:   Supple, no adenopathy, no JVD   Back:     Symmetric, no spinal or CVA tenderness   Lungs:     Clear to auscultation bilaterally, no wheezing or rhonchi   Heart:    Regular rate and rhythm, S1 and S2 normal, no murmur   Abdomen:     Soft, NTND, +BS   Extremities:   Extremities normal  No clubbing, cyanosis or edema   Psych:   Normal Affect   Neurologic:   CNII-XII intact  Strength symmetric, speech intact           Physical Exam   Pulmonary/Chest:                                       Some portions of this record may have been generated with voice recognition software  There may be translation, syntax,  or grammatical errors  Occasional wrong word or "sound-a-like" substitutions may have occurred due to the inherent limitations of the voice recognition software  Read the chart carefully and recognize, using context, where substitutions may have occurred  If you have any questions, please contact the dictating provider for clarification or correction, as needed  This encounter has been coded by a non-certified coder         Breann Conrad MD    Date: 11/1/2018 Time: 1:58 PM

## 2018-11-01 NOTE — LETTER
November 1, 2018     Kelly LouiseDO  [address] [facility]  [address] [facility]    Patient: Charmaine Floers   YOB: 1965   Date of Visit: 11/1/2018       Dear Dr Jaylan Rachel: Thank you for referring Charmaine Flores to me for evaluation  Below are my notes for this consultation  If you have questions, please do not hesitate to call me  I look forward to following your patient along with you  Sincerely,        Lucero Carnes MD        CC: No Recipients  Georgina Mahoney  11/1/2018  2:13 PM  Sign at close encounter  Assessment/Plan:   Charmaine Flores is a 45 y o male who is here for The encounter diagnosis was Mass of right axilla  45 y o  Male with mass of right axilla for two weeks  Mass is tender to touch  Associated with fevers and sweats  No trauma or injury  Patient seen in the ED two days ago and started on oral antibiotics with minimal improvement    US with ovoid structure in right axilla measuring 3 x 2 6 x1 6 with internal vascularity suggesting a solid mass  Plan: Continue antibiotics as prescribed  Follow up [address] in 3 - weeks  Return to office if change or worsening of symptoms  If no improvement may need biopsy of mass         Preoperative Clearance: None        - Patient has been instructed to avoid herbs or non-directed vitamins the week prior to surgery to ensure no drug interactions with perioperative surgical and anesthetic medications  - Patient should continue antihypertensive medications up through and including the day of surgery  - Patient should continue beta-blocker medication up through and including the day of surgery  - Patient should continue his statin medication up through and including the day of surgery  - Patient has been instructed to avoid aspirin containing medications or non-steroidal anti-inflammatory drugs for the week preceding surgery      ______________________________________________________  CC:Abscess (Right Axilla x2wks)    HPI:  Gertrude Rodarte is a 45 y o male who was referred for evaluation of Abscess (Right Axilla x2wks)    Currently mass and swelling of right axilla  Associated fevers and sweats  No injury or trauma to area      ROS:  General ROS: negative  negative for - chills, fatigue, fever or night sweats, weight loss  Respiratory ROS: no cough, shortness of breath, or wheezing  Cardiovascular ROS: no chest pain or dyspnea on exertion  Genito-Urinary ROS: no dysuria, trouble voiding, or hematuria  Musculoskeletal ROS: negative for - gait disturbance, joint pain or muscle pain  Neurological ROS: no TIA or stroke symptoms  Gastrointestinal: see HPI  No abdominal pain, melena, BRB unless specified in HPI  Skin ROS: no new rashes or lesions   Lymphatic ROS: no new adenopathy noted by pt  GYN ROS: see HPI, no new GYN history or bleeding noted  Psy ROS: no new mental or behavioral disturbances       Patient Active Problem List   Diagnosis    Hypertension    Old myocardial infarct    Depressed    CAD (coronary artery disease)    Anxiety    Chest pain    Mixed hyperlipidemia         Allergies:  Patient has no known allergies  Current Outpatient Prescriptions:     aspirin 81 MG tablet, Take 81 mg by mouth daily  , Disp: , Rfl:     atorvastatin (LIPITOR) 40 mg tablet, Take 40 mg by mouth , Disp: , Rfl:     cephalexin (KEFLEX) 500 mg capsule, Take 1 capsule (500 mg total) by mouth 4 (four) times a day for 7 days, Disp: 28 capsule, Rfl: 0    gabapentin (NEURONTIN) 600 MG tablet, Take 600 mg by mouth 3 (three) times a day , Disp: , Rfl:     ibuprofen (MOTRIN) 600 mg tablet, Take 1 tablet (600 mg total) by mouth every 8 (eight) hours as needed for mild pain for up to 10 days, Disp: 30 tablet, Rfl: 0    lisinopril (ZESTRIL) 40 mg tablet, Take 40 mg by mouth 2 (two) times a day , Disp: , Rfl:     LORazepam (ATIVAN) 2 mg tablet, Take 1 mg by mouth every 6 (six) hours, Disp: , Rfl:     methocarbamol (ROBAXIN) 750 mg tablet, Take 750 mg by mouth 3 (three) times a day , Disp: , Rfl:     mirtazapine (REMERON) 45 MG tablet, Take 45 mg by mouth daily at bedtime  , Disp: , Rfl:     nitroglycerin (NITROSTAT) 0 4 mg SL tablet, Place 1 tablet (0 4 mg total) under the tongue every 5 (five) minutes as needed for chest pain, Disp: 25 tablet, Rfl: 3    omeprazole (PriLOSEC) 20 mg delayed release capsule, Take 20 mg by mouth 2 (two) times a day , Disp: , Rfl:     QUEtiapine (SEROquel) 100 mg tablet, Take 100 mg by mouth daily at bedtime  , Disp: , Rfl:     rOPINIRole (REQUIP) 1 mg tablet, Take 1 mg by mouth daily  , Disp: , Rfl:     traMADol (ULTRAM) 50 mg tablet, Take 50 mg by mouth 2 (two) times a day , Disp: , Rfl:     triamcinolone (KENALOG) 0 1 % cream, Apply topically 2 (two) times a day , Disp: , Rfl:     vilazodone (VIIBRYD) 40 mg tablet, Take 40 mg by mouth, Disp: , Rfl:     ZETIA 10 MG tablet, , Disp: , Rfl:     cephalexin (KEFLEX) 500 mg capsule, TK 1 C PO QID FOR 7 DAYS, Disp: , Rfl:     LORazepam (ATIVAN) 1 mg tablet, Take 1 mg by mouth every 6 (six) hours as needed for anxiety  , Disp: , Rfl:     mirtazapine (REMERON) 45 MG tablet, Take 45 mg by mouth, Disp: , Rfl:     QUEtiapine (SEROquel) 100 mg tablet, Take 100 mg by mouth, Disp: , Rfl:     VIIBRYD 40 MG tablet, , Disp: , Rfl:     Past Medical History:   Diagnosis Date    Anxiety     CAD (coronary artery disease)     Depressed     Hypertension     MI (myocardial infarction) ([facility] [address] )     2009       Past Surgical History:   Procedure Laterality Date    ANGIOPLASTY  2009     LAD       Family History   Problem Relation Age of Onset    Heart attack Father     Heart disease Brother         reports that he has quit smoking  He has never used smokeless tobacco  He reports that he does not drink alcohol or use drugs      Labs:   Lab Results   Component Value Date    WBC 7 37 10/29/2018    HGB 15 2 10/29/2018    HCT 43 9 10/29/2018    MCV 89 10/29/2018     10/29/2018     Lab Results   Component Value Date     03/22/2014    K 4 4 10/29/2018     10/29/2018    CO2 30 10/29/2018    ANIONGAP 9 03/22/2014    BUN 14 10/29/2018    CREATININE 0 89 10/29/2018    GLUCOSE 92 03/22/2014    CALCIUM 9 3 10/29/2018    AST 18 10/29/2018    ALT 38 10/29/2018    ALKPHOS 70 10/29/2018    EGFR 97 10/29/2018         Imaging: No new pertinent imaging studies  PHYSICAL EXAM        PHYSICAL EXAM  General Appearance:    Alert, cooperative, no distress, healthy   Head:    Normocephalic without obvious abnormality   Eyes:    PERRL, conjunctiva/corneas clear, EOM's intact        Neck:   Supple, no adenopathy, no JVD   Back:     Symmetric, no spinal or CVA tenderness   Lungs:     Clear to auscultation bilaterally, no wheezing or rhonchi   Heart:    Regular rate and rhythm, S1 and S2 normal, no murmur   Abdomen:     Soft, NTND, +BS   Extremities:   Extremities normal  No clubbing, cyanosis or edema   Psych:   Normal Affect   Neurologic:   CNII-XII intact  Strength symmetric, speech intact           Physical Exam   Pulmonary/Chest:                                       Some portions of this record may have been generated with voice recognition software  There may be translation, syntax,  or grammatical errors  Occasional wrong word or "sound-a-like" substitutions may have occurred due to the inherent limitations of the voice recognition software  Read the chart carefully and recognize, using context, where substitutions may have occurred  If you have any questions, please contact the dictating provider for clarification or correction, as needed  This encounter has been coded by a non-certified coder         Yoel Hughes MD    Date: 11/1/2018 Time: 1:58 PM

## 2018-11-03 LAB
BACTERIA BLD CULT: NORMAL
BACTERIA BLD CULT: NORMAL

## 2018-11-14 ENCOUNTER — TELEPHONE (OUTPATIENT)
Dept: SURGERY | Facility: CLINIC | Age: 53
End: 2018-11-14

## 2018-11-14 NOTE — TELEPHONE ENCOUNTER
Earlier today     Clinical received call from patients son, Kit Ochoa, stating that patient had finished his antibiotics for right axilla infection - was still in pain and was again running a fever of 100  Informed that doc was in surgery, but would be paged  Patient is scheduled for appt tomorrow with Dr Vipul Burns  Spoke to Dr Vipul Burns  She states that if area is not bright red and not oozing, tomorrows appt would be fine  Called and spoke to patient  He states that the area looks the same as it did, no drainage   He's aware that tomorrows appt is sufficient, and he does not need to report to ED today

## 2018-11-15 ENCOUNTER — OFFICE VISIT (OUTPATIENT)
Dept: SURGERY | Facility: CLINIC | Age: 53
End: 2018-11-15
Payer: COMMERCIAL

## 2018-11-15 VITALS
BODY MASS INDEX: 21.9 KG/M2 | WEIGHT: 153 LBS | HEIGHT: 70 IN | SYSTOLIC BLOOD PRESSURE: 122 MMHG | RESPIRATION RATE: 16 BRPM | TEMPERATURE: 97.6 F | HEART RATE: 100 BPM | DIASTOLIC BLOOD PRESSURE: 76 MMHG

## 2018-11-15 DIAGNOSIS — R59.9 ADENOPATHY: Primary | ICD-10-CM

## 2018-11-15 PROCEDURE — 99213 OFFICE O/P EST LOW 20 MIN: CPT | Performed by: SURGERY

## 2018-11-15 RX ORDER — CEPHALEXIN 500 MG/1
500 CAPSULE ORAL EVERY 8 HOURS SCHEDULED
Qty: 30 CAPSULE | Refills: 1 | Status: SHIPPED | OUTPATIENT
Start: 2018-11-15 | End: 2018-11-25

## 2018-11-15 NOTE — PROGRESS NOTES
Assessment/Plan:   Rico Valdez is a 45 y o male who is here for There were no encounter diagnoses  On exam found to have enlarging lymph node measuring 3 x 2 6 x 1 6 centimeters of the right axilla  More enlarged and edematous and red than before  No prodromal symptoms  He did have fevers but they have now resolved after antibiotics  Plan: Excise lesion(s) under anesthesia in the operating room      Continue antibiotics until operating room  - Patient has been instructed to avoid herbs or non-directed vitamins the week prior to surgery to ensure no drug interactions with perioperative surgical and anesthetic medications  - Patient should continue beta-blocker medication up through and including the day of surgery but hold any other hypertensive medications, including diuretics, unless instructed by PCP or anesthesia  - Patient should continue his statin medication up through and including the day of surgery   - Hold metformin , If on this medication, the morning of surgery and do not resume until 48 hours AFTER surgery to avoid risk of lactic acidosis  Do not resume if eGFR is < 30  - Insulin Management:If on Insulin, patient advised to call PCP for explicit instructions  In general, will need to take one-half normal dose am of surgery but pt advised to consult PCP before making any changes  - Patient has been instructed to avoid aspirin containing medications or non-steroidal anti-inflammatory drugs for SEVEN days preceding surgery  Preoperative Clearance: None          _______________________________________________________  CC: Follow-up (Mass in right axilla)    HPI:  Rico Valdez is a 45 y o male who was referred for evaluation of Follow-up (Mass in right axilla)    Currently patient reports several week history of enlarging axillary adenopathy  Interestingly his son had sub mandible adenopathy and several days later it this patient had adenopathy    He denies any current symptoms of fevers or sweats but he did have fevers or sweats earlier in the course of this disease which was now about a month  No trauma or injury  Started on oral antibiotics in the emergency room  He has had no significant improvement in the size of the lymph node in the last 2-3 weeks on cephalexin  The only symptom that is improved as he no longer has fevers        Reports: growing    ROS:  General ROS: negative  negative for - chills, fatigue, fever or night sweats, weight loss  Respiratory ROS: no cough, shortness of breath, or wheezing  Cardiovascular ROS: no chest pain or dyspnea on exertion  Genito-Urinary ROS: no dysuria, trouble voiding, or hematuria  Musculoskeletal ROS: negative for - gait disturbance, joint pain or muscle pain  Neurological ROS: no TIA or stroke symptoms  Skin ROS: See HPI  GI ROS: see HPI  Skin ROS: no new rashes or lesions   Lymphatic ROS: no new adenopathy noted by pt  GYN ROS: see HPI, no new GYN history or bleeding noted  Psy ROS: no new mental or behavioral disturbances       Patient Active Problem List   Diagnosis    Hypertension    Old myocardial infarct    Depressed    CAD (coronary artery disease)    Anxiety    Chest pain    Mixed hyperlipidemia         Allergies:  Patient has no known allergies  Current Outpatient Prescriptions:     aspirin 81 MG tablet, Take 81 mg by mouth daily  , Disp: , Rfl:     atorvastatin (LIPITOR) 40 mg tablet, Take 40 mg by mouth , Disp: , Rfl:     gabapentin (NEURONTIN) 600 MG tablet, Take 600 mg by mouth 3 (three) times a day , Disp: , Rfl:     lisinopril (ZESTRIL) 40 mg tablet, Take 40 mg by mouth 2 (two) times a day , Disp: , Rfl:     LORazepam (ATIVAN) 2 mg tablet, Take 1 mg by mouth every 6 (six) hours, Disp: , Rfl:     methocarbamol (ROBAXIN) 750 mg tablet, Take 750 mg by mouth 3 (three) times a day , Disp: , Rfl:     mirtazapine (REMERON) 45 MG tablet, Take 45 mg by mouth daily at bedtime  , Disp: , Rfl:   nitroglycerin (NITROSTAT) 0 4 mg SL tablet, Place 1 tablet (0 4 mg total) under the tongue every 5 (five) minutes as needed for chest pain, Disp: 25 tablet, Rfl: 3    omeprazole (PriLOSEC) 20 mg delayed release capsule, Take 20 mg by mouth 2 (two) times a day , Disp: , Rfl:     QUEtiapine (SEROquel) 100 mg tablet, Take 100 mg by mouth daily at bedtime  , Disp: , Rfl:     rOPINIRole (REQUIP) 1 mg tablet, Take 1 mg by mouth daily  , Disp: , Rfl:     traMADol (ULTRAM) 50 mg tablet, Take 50 mg by mouth 2 (two) times a day , Disp: , Rfl:     triamcinolone (KENALOG) 0 1 % cream, Apply topically 2 (two) times a day , Disp: , Rfl:     vilazodone (VIIBRYD) 40 mg tablet, Take 40 mg by mouth, Disp: , Rfl:     ZETIA 10 MG tablet, , Disp: , Rfl:     cephalexin (KEFLEX) 500 mg capsule, TK 1 C PO QID FOR 7 DAYS, Disp: , Rfl:     ibuprofen (MOTRIN) 600 mg tablet, Take 1 tablet (600 mg total) by mouth every 8 (eight) hours as needed for mild pain for up to 10 days, Disp: 30 tablet, Rfl: 0    LORazepam (ATIVAN) 1 mg tablet, Take 1 mg by mouth every 6 (six) hours as needed for anxiety  , Disp: , Rfl:     mirtazapine (REMERON) 45 MG tablet, Take 45 mg by mouth, Disp: , Rfl:     QUEtiapine (SEROquel) 100 mg tablet, Take 100 mg by mouth, Disp: , Rfl:     VIIBRYD 40 MG tablet, , Disp: , Rfl:     Past Medical History:   Diagnosis Date    Anxiety     CAD (coronary artery disease)     Depressed     Hypertension     MI (myocardial infarction) ([facility] [address]     2009       Past Surgical History:   Procedure Laterality Date    ANGIOPLASTY  2009     LAD       Family History   Problem Relation Age of Onset    Heart attack Father     Heart disease Brother         reports that he has quit smoking  He has never used smokeless tobacco  He reports that he does not drink alcohol or use drugs      PHYSICAL EXAM  General Appearance:    Alert, cooperative, no distress,    Head:    Normocephalic without obvious abnormality   Eyes:    PERRL, conjunctiva/corneas clear, EOM's intact        Neck:   Supple, no adenopathy, no JVD   Back:     Symmetric, no spinal or CVA tenderness   Lungs:     Clear to auscultation bilaterally, no wheezing or rhonchi   Heart:    Regular rate and rhythm, S1 and S2 normal, no murmur   Abdomen:     Benign, no rebound or guarding  Extremities:   Extremities normal  No clubbing, cyanosis or edema   Psych:   Normal Affect, AOx3  Neurologic:  Skin:   CNII-XII intact  Strength symmetric, speech intact    Warm, dry, intact, no visible rashes or lesions except as follows:     Large adenopathy which is visible in the right axilla  Patient is skinny  No other adenopathy in the clavicular region, head or neck, abdomen  No organomegaly  The nodule is about 3 centimeters and is firm and somewhat reddened on the skin  Some portions of this record may have been generated with voice recognition software  There may be translation, syntax,  or grammatical errors  Occasional wrong word or "sound-a-like" substitutions may have occurred due to the inherent limitations of the voice recognition software  Read the chart carefully and recognize, using context, where substitutions may have occurred  If you have any questions, please contact the dictating provider for clarification or correction, as needed  This encounter has been coded by a non-certified coder         Raheem Alvarado MD    Date: 11/15/2018 Time: 10:55 AM

## 2018-11-19 ENCOUNTER — TELEPHONE (OUTPATIENT)
Dept: SURGERY | Facility: CLINIC | Age: 53
End: 2018-11-19

## 2018-11-19 ENCOUNTER — ANESTHESIA EVENT (OUTPATIENT)
Dept: PERIOP | Facility: HOSPITAL | Age: 53
End: 2018-11-19
Payer: COMMERCIAL

## 2018-11-19 RX ORDER — LISINOPRIL 20 MG/1
20 TABLET ORAL 2 TIMES DAILY
COMMUNITY
End: 2019-09-17

## 2018-11-19 RX ORDER — ACETAMINOPHEN 500 MG
500 TABLET ORAL EVERY 6 HOURS PRN
COMMUNITY

## 2018-11-19 NOTE — PRE-PROCEDURE INSTRUCTIONS
Pre-Surgery Instructions:   Medication Instructions    acetaminophen (TYLENOL) 500 mg tablet Patient was instructed by Physician and understands   aspirin 81 MG tablet Patient was instructed by Physician and understands   atorvastatin (LIPITOR) 40 mg tablet Patient was instructed by Physician and understands   cephalexin (KEFLEX) 500 mg capsule Patient was instructed by Physician and understands   gabapentin (NEURONTIN) 600 MG tablet Patient was instructed by Physician and understands   lisinopril (ZESTRIL) 20 mg tablet Patient was instructed by Physician and understands   LORazepam (ATIVAN) 1 mg tablet Patient was instructed by Physician and understands   methocarbamol (ROBAXIN) 750 mg tablet Patient was instructed by Physician and understands   mirtazapine (REMERON) 45 MG tablet Patient was instructed by Physician and understands   nitroglycerin (NITROSTAT) 0 4 mg SL tablet Patient was instructed by Physician and understands   omeprazole (PriLOSEC) 20 mg delayed release capsule Patient was instructed by Physician and understands   QUEtiapine (SEROquel) 100 mg tablet Patient was instructed by Physician and understands   rOPINIRole (REQUIP) 1 mg tablet Patient was instructed by Physician and understands   traMADol (ULTRAM) 50 mg tablet Patient was instructed by Physician and understands   triamcinolone (KENALOG) 0 1 % cream Patient was instructed by Physician and understands   VIIBRYD 40 MG tablet Patient was instructed by Physician and understands   ZETIA 10 MG tablet Patient was instructed by Physician and understands  Pt instructed to take omeprazole with a small sip of water the morning of surgery  [facility] preop instructions reviewed with pt  Pt will get dial antibacterial soap

## 2018-11-19 NOTE — TELEPHONE ENCOUNTER
MD Savannah Hairston MA      Call patient to inform his that we now need cardiac clearance before proceeding with the surgery on Wednesday  Faxed over clearance letter to Dr Dru Mills office         Previous Messages      ----- Message -----   From: Diana Smyth RN   Sent: 11/19/2018  10:57 AM   To: Wade Grier RN, Mamadou Mg MD, *     This patient is scheduled for surgery on 11/21/18  Lula Diallo reports during his phone assessment that he experienced chest pain 3 weeks ago that was relieved by taking 1 SL Demarco Steph states he did not notify Dr Poonam Segovia and he last saw him in August  Lula Diallo also states he notified his family provider of the incident during his follow up exam but I don't see any note of it  Lita Dial did not recommend any additional testing       Please let us know if you would like to do anything further

## 2018-11-20 NOTE — TELEPHONE ENCOUNTER
Received cardiac clearance from Dr Bharat Asif  Patient may proceed with surgery tomorrow  Call patient to let him know that he is cleared

## 2018-11-21 ENCOUNTER — ANESTHESIA (OUTPATIENT)
Dept: PERIOP | Facility: HOSPITAL | Age: 53
End: 2018-11-21
Payer: COMMERCIAL

## 2018-11-21 ENCOUNTER — HOSPITAL ENCOUNTER (OUTPATIENT)
Facility: HOSPITAL | Age: 53
Setting detail: OUTPATIENT SURGERY
Discharge: HOME | End: 2018-11-21
Attending: SURGERY | Admitting: SURGERY
Payer: COMMERCIAL

## 2018-11-21 VITALS
HEIGHT: 70 IN | SYSTOLIC BLOOD PRESSURE: 158 MMHG | WEIGHT: 153 LBS | DIASTOLIC BLOOD PRESSURE: 79 MMHG | RESPIRATION RATE: 20 BRPM | HEART RATE: 71 BPM | TEMPERATURE: 98.4 F | OXYGEN SATURATION: 98 % | BODY MASS INDEX: 21.9 KG/M2

## 2018-11-21 DIAGNOSIS — R59.9 ADENOPATHY: Primary | ICD-10-CM

## 2018-11-21 PROCEDURE — 88342 IMHCHEM/IMCYTCHM 1ST ANTB: CPT | Performed by: PATHOLOGY

## 2018-11-21 PROCEDURE — 38500 BIOPSY/REMOVAL LYMPH NODES: CPT | Performed by: SURGERY

## 2018-11-21 PROCEDURE — 87205 SMEAR GRAM STAIN: CPT | Performed by: SURGERY

## 2018-11-21 PROCEDURE — 87176 TISSUE HOMOGENIZATION CULTR: CPT | Performed by: SURGERY

## 2018-11-21 PROCEDURE — 88184 FLOWCYTOMETRY/ TC 1 MARKER: CPT | Performed by: SURGERY

## 2018-11-21 PROCEDURE — 88341 IMHCHEM/IMCYTCHM EA ADD ANTB: CPT | Performed by: PATHOLOGY

## 2018-11-21 PROCEDURE — 88305 TISSUE EXAM BY PATHOLOGIST: CPT | Performed by: PATHOLOGY

## 2018-11-21 PROCEDURE — 87070 CULTURE OTHR SPECIMN AEROBIC: CPT | Performed by: SURGERY

## 2018-11-21 PROCEDURE — 87075 CULTR BACTERIA EXCEPT BLOOD: CPT | Performed by: SURGERY

## 2018-11-21 RX ORDER — MIDAZOLAM HYDROCHLORIDE 1 MG/ML
INJECTION INTRAMUSCULAR; INTRAVENOUS AS NEEDED
Status: DISCONTINUED | OUTPATIENT
Start: 2018-11-21 | End: 2018-11-21 | Stop reason: SURG

## 2018-11-21 RX ORDER — ONDANSETRON 4 MG/1
4 TABLET, ORALLY DISINTEGRATING ORAL EVERY 4 HOURS PRN
Status: DISCONTINUED | OUTPATIENT
Start: 2018-11-21 | End: 2018-11-21 | Stop reason: HOSPADM

## 2018-11-21 RX ORDER — KETOROLAC TROMETHAMINE 30 MG/ML
INJECTION, SOLUTION INTRAMUSCULAR; INTRAVENOUS AS NEEDED
Status: DISCONTINUED | OUTPATIENT
Start: 2018-11-21 | End: 2018-11-21 | Stop reason: SURG

## 2018-11-21 RX ORDER — CEFAZOLIN SODIUM 1 G/50ML
1000 SOLUTION INTRAVENOUS ONCE
Status: COMPLETED | OUTPATIENT
Start: 2018-11-21 | End: 2018-11-21

## 2018-11-21 RX ORDER — HYDROMORPHONE HCL/PF 1 MG/ML
0.5 SYRINGE (ML) INJECTION
Status: DISCONTINUED | OUTPATIENT
Start: 2018-11-21 | End: 2018-11-21 | Stop reason: HOSPADM

## 2018-11-21 RX ORDER — MORPHINE SULFATE 10 MG/ML
2 INJECTION, SOLUTION INTRAMUSCULAR; INTRAVENOUS EVERY 2 HOUR PRN
Status: DISCONTINUED | OUTPATIENT
Start: 2018-11-21 | End: 2018-11-21 | Stop reason: HOSPADM

## 2018-11-21 RX ORDER — ONDANSETRON 2 MG/ML
4 INJECTION INTRAMUSCULAR; INTRAVENOUS ONCE AS NEEDED
Status: DISCONTINUED | OUTPATIENT
Start: 2018-11-21 | End: 2018-11-21 | Stop reason: HOSPADM

## 2018-11-21 RX ORDER — HYDROCODONE BITARTRATE AND ACETAMINOPHEN 5; 325 MG/1; MG/1
1 TABLET ORAL EVERY 4 HOURS PRN
Qty: 10 TABLET | Refills: 0 | Status: SHIPPED | OUTPATIENT
Start: 2018-11-21 | End: 2021-12-24 | Stop reason: CLARIF

## 2018-11-21 RX ORDER — DEXTROSE AND SODIUM CHLORIDE 5; .45 G/100ML; G/100ML
80 INJECTION, SOLUTION INTRAVENOUS CONTINUOUS
Status: DISCONTINUED | OUTPATIENT
Start: 2018-11-21 | End: 2018-11-21 | Stop reason: HOSPADM

## 2018-11-21 RX ORDER — HYDROCODONE BITARTRATE AND ACETAMINOPHEN 5; 325 MG/1; MG/1
1 TABLET ORAL EVERY 4 HOURS PRN
Qty: 10 TABLET | Refills: 0 | Status: SHIPPED | OUTPATIENT
Start: 2018-11-21 | End: 2018-11-21

## 2018-11-21 RX ORDER — SODIUM CHLORIDE 9 MG/ML
125 INJECTION, SOLUTION INTRAVENOUS CONTINUOUS
Status: DISCONTINUED | OUTPATIENT
Start: 2018-11-21 | End: 2018-11-21 | Stop reason: HOSPADM

## 2018-11-21 RX ORDER — FENTANYL CITRATE/PF 50 MCG/ML
50 SYRINGE (ML) INJECTION
Status: DISCONTINUED | OUTPATIENT
Start: 2018-11-21 | End: 2018-11-21 | Stop reason: HOSPADM

## 2018-11-21 RX ORDER — ONDANSETRON 2 MG/ML
INJECTION INTRAMUSCULAR; INTRAVENOUS AS NEEDED
Status: DISCONTINUED | OUTPATIENT
Start: 2018-11-21 | End: 2018-11-21 | Stop reason: SURG

## 2018-11-21 RX ORDER — CEPHALEXIN 500 MG/1
500 CAPSULE ORAL EVERY 6 HOURS SCHEDULED
Qty: 40 CAPSULE | Refills: 0 | Status: SHIPPED | OUTPATIENT
Start: 2018-11-21 | End: 2018-12-01

## 2018-11-21 RX ORDER — ONDANSETRON 2 MG/ML
4 INJECTION INTRAMUSCULAR; INTRAVENOUS EVERY 4 HOURS PRN
Status: DISCONTINUED | OUTPATIENT
Start: 2018-11-21 | End: 2018-11-21 | Stop reason: HOSPADM

## 2018-11-21 RX ORDER — FENTANYL CITRATE 50 UG/ML
INJECTION, SOLUTION INTRAMUSCULAR; INTRAVENOUS AS NEEDED
Status: DISCONTINUED | OUTPATIENT
Start: 2018-11-21 | End: 2018-11-21 | Stop reason: SURG

## 2018-11-21 RX ORDER — PROPOFOL 10 MG/ML
INJECTION, EMULSION INTRAVENOUS CONTINUOUS PRN
Status: DISCONTINUED | OUTPATIENT
Start: 2018-11-21 | End: 2018-11-21 | Stop reason: SURG

## 2018-11-21 RX ORDER — HYDROCODONE BITARTRATE AND ACETAMINOPHEN 5; 325 MG/1; MG/1
1 TABLET ORAL EVERY 4 HOURS PRN
Status: DISCONTINUED | OUTPATIENT
Start: 2018-11-21 | End: 2018-11-21 | Stop reason: HOSPADM

## 2018-11-21 RX ORDER — ACETAMINOPHEN 325 MG/1
650 TABLET ORAL EVERY 6 HOURS PRN
Status: DISCONTINUED | OUTPATIENT
Start: 2018-11-21 | End: 2018-11-21 | Stop reason: HOSPADM

## 2018-11-21 RX ADMIN — ACETAMINOPHEN 650 MG: 325 TABLET, FILM COATED ORAL at 14:56

## 2018-11-21 RX ADMIN — MIDAZOLAM 2 MG: 1 INJECTION INTRAMUSCULAR; INTRAVENOUS at 13:21

## 2018-11-21 RX ADMIN — FENTANYL CITRATE 50 MCG: 50 INJECTION, SOLUTION INTRAMUSCULAR; INTRAVENOUS at 13:30

## 2018-11-21 RX ADMIN — MIDAZOLAM 2 MG: 1 INJECTION INTRAMUSCULAR; INTRAVENOUS at 13:23

## 2018-11-21 RX ADMIN — CEFAZOLIN SODIUM 1000 MG: 1 SOLUTION INTRAVENOUS at 13:21

## 2018-11-21 RX ADMIN — SODIUM CHLORIDE 125 ML/HR: 0.9 INJECTION, SOLUTION INTRAVENOUS at 11:43

## 2018-11-21 RX ADMIN — KETOROLAC TROMETHAMINE 30 MG: 30 INJECTION, SOLUTION INTRAMUSCULAR at 13:46

## 2018-11-21 RX ADMIN — FENTANYL CITRATE 50 MCG: 50 INJECTION, SOLUTION INTRAMUSCULAR; INTRAVENOUS at 13:38

## 2018-11-21 RX ADMIN — ONDANSETRON HYDROCHLORIDE 4 MG: 2 INJECTION, SOLUTION INTRAVENOUS at 13:29

## 2018-11-21 RX ADMIN — PROPOFOL 200 MCG/KG/MIN: 10 INJECTION, EMULSION INTRAVENOUS at 13:21

## 2018-11-21 RX ADMIN — FENTANYL CITRATE 50 MCG: 50 INJECTION, SOLUTION INTRAMUSCULAR; INTRAVENOUS at 13:44

## 2018-11-21 RX ADMIN — FENTANYL CITRATE 50 MCG: 50 INJECTION, SOLUTION INTRAMUSCULAR; INTRAVENOUS at 13:21

## 2018-11-21 NOTE — ANESTHESIA PREPROCEDURE EVALUATION
Review of Systems/Medical History  Patient summary reviewed  Chart reviewed  No history of anesthetic complications     Cardiovascular  Hyperlipidemia, Hypertension controlled, Past MI (2009) > 6 months, CAD , History of percutaneous transluminal coronary angioplasty, Angina Stable,    Pulmonary  Smoker ex-smoker  ,        GI/Hepatic    GERD well controlled,             Endo/Other  Negative endo/other ROS      GYN       Hematology  Negative hematology ROS      Musculoskeletal  Back pain , lumbar pain,   Arthritis     Neurology  Negative neurology ROS     Comment: [facility]   Anxiety, Depression , being treated for depression,              Physical Exam    Airway    Mallampati score: II  TM Distance: >3 FB  Neck ROM: full     Dental   lower dentures and upper dentures,     Cardiovascular  Rhythm: regular, Rate: normal, Cardiovascular exam normal    Pulmonary  Pulmonary exam normal     Other Findings        Anesthesia Plan  ASA Score- 3     Anesthesia Type- IV sedation with anesthesia with ASA Monitors  Additional Monitors:   Airway Plan:     Comment: Patient is ADAMENT that he does NOT want to be intubated for case today  He also had  CP last week--see note below:   Called pt    This is noncardiac pain    He has this intermittently  Charma Pippins    Proceed with surgery    Clyde Manley MD           Plan Factors-Patient not instructed to abstain from smoking on day of procedure  Patient did not smoke on day of surgery  Induction- intravenous  Postoperative Plan-     Informed Consent- Anesthetic plan and risks discussed with patient and spouse

## 2018-11-21 NOTE — OP NOTE
EXCISION BIOPSY LYMPH NODE AXILLARY RIGHT  Postoperative Note  PATIENT NAME: Erik Metz  : 1965  MRN: [id number]  [facility] OR ROOM 06    Surgery Date: 2018    Pre operative diagnosis:   Adenopathy [R59 1]    Operative Indications:  Soft tissue mass    Operative Findings:  Pus  on entering the lymph node and axilla cavity  Granulation tissue and rind consistent with abscess  No normal lymph nodes seen  Tissue taken and sent for pathology evaluation  Cultures sent    Consent:  The risks, benefits, and alternatives to the surgery were discussed with the patient and with the family prior to surgery, personally by Dr Sydnee Murrell  If the consent was obtained by the physician assistant or other representative, the consent was reviewed once again personally by the operating physician  Common complications particular for this procedure as well as unusual complications were discussed, including but not limited to:  bleeding, wound infection, prolonged wound healing, open wounds, reoperation and/or recurrence of the lesion  A  was used if necessary  The patient expressed understanding of the issues discussed and wished and consented to the procedure to proceed  All questions were answered  Dr Sydnee Murrell personally discussed the informed consent with this patient  Post operative diagnosis :and findings  Post-Op Diagnosis Codes:     * Adenopathy [R59 1]    Procedure:   Procedure(s):  EXCISION BIOPSY LYMPH NODE AXILLARY RIGHT    Surgeon(s) and Role:     * Clarisse Serrano MD - Primary    The Physician Assistant was medically necessary for surgical safety the case including suturing, retraction, and hemostasis  No qualified resident was available  I was present for the entire procedure       Drains:       Specimens:  ID Type Source Tests Collected by Time Destination   1 :  Tissue Lymph Node - Lymphoma Prtocol TISSUE EXAM, LEUKEMIA/LYMPHOMA FLOW CYTOMETRY Clarisse Serrano MD 2018 1344    A :  Tissue Axillary lymph node ANAEROBIC CULTURE AND GRAM STAIN Leticia Shepard MD 2018 1343    B :  Tissue Axillary lymph node CULTURE, TISSUE AND GRAM STAIN Leticia Shepard MD 2018 1343        Estimated Blood Loss:   Minimal    Anesthesia Type:   Choice     Procedure: The patient was seen in the Holding Room  The risks, benefits, complications, treatment options, and expected outcomes were discussed with the patient  The possibilities of reaction to medication, bleeding, infection, the need for additional procedures, failure to diagnose a condition, and creating a complication operation were discussed with the patient  The patient concurred with the proposed plan, giving informed consent  The site of surgery properly noted/marked  The patient was taken to Operating Room, identified as [facility] and staff verified the patient name, , site, and laterality, if applicable  A Time Out was held and the above information confirmed  The patient was placed supine  The right axilla was prepped and draped in standard fashion  Local anesthesia was used to anesthetize the skin surrounding a 3 cm lesion  A oblique elliptical incision was made over the lesion  Sharp and blunt dissection were used to mobilize the mass which was in a subcutaneous location  Hemostasis was achieved with cautery  Scissors, knife, and cautery were used in the excision so that 2mm  margins were taken around the lesion  Upon entering the axilla and the mass, pus was immediately encountered  Residual lymph node which was consistent with abscess and rind was excised using scissors, knife and cautery and sent for pathology fresh for evaluation  They will decide if it is appropriate to send it for flow cytometry  The wound was irrigated and left open  This was packed with nearly a full length 2 in [address] was with Betadine  The wound was dressed          At the end of the operation, all sponge, instrument, and needle counts were correct  Skin and soft tissue and fat were removed along with the mass  Some portions of this records may have been generated with voice recognition software  There may be translation, syntax,  or grammatical errors  Occasional wrong word or "sound-a-like" substitutions may have occurred due to the inherent limitations of the voice recognition software  Read the chart carefully and recognize, using context, where substations may have occurred  If you have any questions, please contact the dictating provider for clarification or correction, as needed         Complications: None    Condition: Stable to PACU    SIGNATURE: Juwan Baldwin MD   DATE: November 21, 2018   TIME: 1:45 PM

## 2018-11-21 NOTE — DISCHARGE INSTRUCTIONS
[facility] Instructions  Dr Orlando Birmingham MD, FACS      Keep dressing on the right axilla in till seen in the office Monday morning, 10:00 a m     We will change the packing in the  packing changes in the office on Friday  1  General: You will feel pulling sensations around the wound or funny aches and pains around the incisions  This is normal  Even minor surgery is a change in your body and this is your bodys way of reaction to it  If you have had abdominal surgery, it may help to support the incision with a small pillow or blanket for comfort when moving or coughing  2  Wound care: Make sure to remove the bandage in about 24 hours, unless instructed otherwise  You usually don't have to redress the wound after 24-48 hours, unless for comfort  Keep the incision clean and dry  Let air get to it  If this Steri-Strips fall off, just keep the wound clean  3  Water: You may shower over the wound, unless there are drain tubes left in place  Do not bathe or use a pool or hot tub until cleared by the physician  You may shower right over the staples or Steri-Strips and packing dry when you are done  4  Activity: You may go up and down stairs, walk as much as you are comfortable, but walk at least 3 times each day  If you have had abdominal surgery, do not lift anything heavier than 15 pounds for at least 2-4 weeks, unless cleared by the doctor  5  Diet: You may resume a regular diet  If you had a same-day surgery or overnight stay surgery, you may wish to eat lightly for a few days: soups, crackers, and sandwiches  You may resume a regular diet when ready  6  Medications: Resume all of your previous medications, unless told otherwise by the doctor  Avoid aspirin or ibuprofen (Advil, Motrin, etc ) products for 2-3 days after the date of surgery  You may, at that time, began to take them again   Tylenol is always fine, unless you are taking any narcotic pain medication containing Tylenol (such as Percocet, Darvocet, Vicodin, or anything containing acetaminophen)  Do not take Tylenol if you're taking these medications  You do not need to take the narcotic pain medications unless you are having significant pain and discomfort  7  Driving: You will need someone to drive you home on the day of surgery  Do not drive or make any important decisions while on narcotic pain medication or 24 hours and after anesthesia or sedation for surgery  Generally, you may drive when your off all narcotic pain medications  8  Upset Stomach: You may take Maalox, Tums, or similar items for an upset stomach  If your narcotic pain medication causes an upset stomach, do not take it on an empty stomach  Try taking it with at least some crackers or toast      9  Constipation: Patients often experienced constipation after surgery  You may take over-the-counter medication for this, such as Metamucil, Senokot, Dulcolax, milk of magnesia, etc  You may take a suppository unless you have had anorectal surgery such as a procedure on your hemorrhoids  If you experience significant nausea or vomiting after abdominal surgery, call the office before trying any of these medications  10  Call the office: If you are experiencing any of the following, fevers above 101 5°, significant nausea or vomiting, if the wound develops drainage and/or is excessive redness around the wound, or if you have significant diarrhea or other worsening symptoms  11  Pain: You may be given a prescription for pain  This will be given to the hospital, the day of surgery  12  Sexual Activity: You may resume sexual activity when you feel ready and comfortable and your incision is sealed and healed without apparent infection risk  13  Urination: If you haven't urinated in 6 hours, go directly to the ER for evaluation for urinary retention       [address]  [place], [place] [id number]                                                                                                                      Phone: [phone]

## 2018-11-21 NOTE — H&P (VIEW-ONLY)
Assessment/Plan:   Yareli Teixeira is a 45 y o male who is here for There were no encounter diagnoses  On exam found to have enlarging lymph node measuring 3 x 2 6 x 1 6 centimeters of the right axilla  More enlarged and edematous and red than before  No prodromal symptoms  He did have fevers but they have now resolved after antibiotics  Plan: Excise lesion(s) under anesthesia in the operating room      Continue antibiotics until operating room  - Patient has been instructed to avoid herbs or non-directed vitamins the week prior to surgery to ensure no drug interactions with perioperative surgical and anesthetic medications  - Patient should continue beta-blocker medication up through and including the day of surgery but hold any other hypertensive medications, including diuretics, unless instructed by PCP or anesthesia  - Patient should continue his statin medication up through and including the day of surgery   - Hold metformin , If on this medication, the morning of surgery and do not resume until 48 hours AFTER surgery to avoid risk of lactic acidosis  Do not resume if eGFR is < 30  - Insulin Management:If on Insulin, patient advised to call PCP for explicit instructions  In general, will need to take one-half normal dose am of surgery but pt advised to consult PCP before making any changes  - Patient has been instructed to avoid aspirin containing medications or non-steroidal anti-inflammatory drugs for SEVEN days preceding surgery  Preoperative Clearance: None          _______________________________________________________  CC: Follow-up (Mass in right axilla)    HPI:  Yareli Teixeira is a 45 y o male who was referred for evaluation of Follow-up (Mass in right axilla)    Currently patient reports several week history of enlarging axillary adenopathy  Interestingly his son had sub mandible adenopathy and several days later it this patient had adenopathy    He denies any current symptoms of fevers or sweats but he did have fevers or sweats earlier in the course of this disease which was now about a month  No trauma or injury  Started on oral antibiotics in the emergency room  He has had no significant improvement in the size of the lymph node in the last 2-3 weeks on cephalexin  The only symptom that is improved as he no longer has fevers        Reports: growing    ROS:  General ROS: negative  negative for - chills, fatigue, fever or night sweats, weight loss  Respiratory ROS: no cough, shortness of breath, or wheezing  Cardiovascular ROS: no chest pain or dyspnea on exertion  Genito-Urinary ROS: no dysuria, trouble voiding, or hematuria  Musculoskeletal ROS: negative for - gait disturbance, joint pain or muscle pain  Neurological ROS: no TIA or stroke symptoms  Skin ROS: See HPI  GI ROS: see HPI  Skin ROS: no new rashes or lesions   Lymphatic ROS: no new adenopathy noted by pt  GYN ROS: see HPI, no new GYN history or bleeding noted  Psy ROS: no new mental or behavioral disturbances       Patient Active Problem List   Diagnosis    Hypertension    Old myocardial infarct    Depressed    CAD (coronary artery disease)    Anxiety    Chest pain    Mixed hyperlipidemia         Allergies:  Patient has no known allergies  Current Outpatient Prescriptions:     aspirin 81 MG tablet, Take 81 mg by mouth daily  , Disp: , Rfl:     atorvastatin (LIPITOR) 40 mg tablet, Take 40 mg by mouth , Disp: , Rfl:     gabapentin (NEURONTIN) 600 MG tablet, Take 600 mg by mouth 3 (three) times a day , Disp: , Rfl:     lisinopril (ZESTRIL) 40 mg tablet, Take 40 mg by mouth 2 (two) times a day , Disp: , Rfl:     LORazepam (ATIVAN) 2 mg tablet, Take 1 mg by mouth every 6 (six) hours, Disp: , Rfl:     methocarbamol (ROBAXIN) 750 mg tablet, Take 750 mg by mouth 3 (three) times a day , Disp: , Rfl:     mirtazapine (REMERON) 45 MG tablet, Take 45 mg by mouth daily at bedtime  , Disp: , Rfl:   nitroglycerin (NITROSTAT) 0 4 mg SL tablet, Place 1 tablet (0 4 mg total) under the tongue every 5 (five) minutes as needed for chest pain, Disp: 25 tablet, Rfl: 3    omeprazole (PriLOSEC) 20 mg delayed release capsule, Take 20 mg by mouth 2 (two) times a day , Disp: , Rfl:     QUEtiapine (SEROquel) 100 mg tablet, Take 100 mg by mouth daily at bedtime  , Disp: , Rfl:     rOPINIRole (REQUIP) 1 mg tablet, Take 1 mg by mouth daily  , Disp: , Rfl:     traMADol (ULTRAM) 50 mg tablet, Take 50 mg by mouth 2 (two) times a day , Disp: , Rfl:     triamcinolone (KENALOG) 0 1 % cream, Apply topically 2 (two) times a day , Disp: , Rfl:     vilazodone (VIIBRYD) 40 mg tablet, Take 40 mg by mouth, Disp: , Rfl:     ZETIA 10 MG tablet, , Disp: , Rfl:     cephalexin (KEFLEX) 500 mg capsule, TK 1 C PO QID FOR 7 DAYS, Disp: , Rfl:     ibuprofen (MOTRIN) 600 mg tablet, Take 1 tablet (600 mg total) by mouth every 8 (eight) hours as needed for mild pain for up to 10 days, Disp: 30 tablet, Rfl: 0    LORazepam (ATIVAN) 1 mg tablet, Take 1 mg by mouth every 6 (six) hours as needed for anxiety  , Disp: , Rfl:     mirtazapine (REMERON) 45 MG tablet, Take 45 mg by mouth, Disp: , Rfl:     QUEtiapine (SEROquel) 100 mg tablet, Take 100 mg by mouth, Disp: , Rfl:     VIIBRYD 40 MG tablet, , Disp: , Rfl:     Past Medical History:   Diagnosis Date    Anxiety     CAD (coronary artery disease)     Depressed     Hypertension     MI [facility]myocardial infarction) ([facility] [address] )     2009       Past Surgical History:   Procedure Laterality Date    ANGIOPLASTY  2009     LAD       Family History   Problem Relation Age of Onset    Heart attack Father     Heart disease Brother         reports that he has quit smoking  He has never used smokeless tobacco  He reports that he does not drink alcohol or use drugs      PHYSICAL EXAM  General Appearance:    Alert, cooperative, no distress,    Head:    Normocephalic without obvious abnormality   Eyes:    PERRL, conjunctiva/corneas clear, EOM's intact        Neck:   Supple, no adenopathy, no JVD   Back:     Symmetric, no spinal or CVA tenderness   Lungs:     Clear to auscultation bilaterally, no wheezing or rhonchi   Heart:    Regular rate and rhythm, S1 and S2 normal, no murmur   Abdomen:     Benign, no rebound or guarding  Extremities:   Extremities normal  No clubbing, cyanosis or edema   Psych:   Normal Affect, AOx3  Neurologic:  Skin:   CNII-XII intact  Strength symmetric, speech intact    Warm, dry, intact, no visible rashes or lesions except as follows:     Large adenopathy which is visible in the right axilla  Patient is skinny  No other adenopathy in the clavicular region, head or neck, abdomen  No organomegaly  The nodule is about 3 centimeters and is firm and somewhat reddened on the skin  Some portions of this record may have been generated with voice recognition software  There may be translation, syntax,  or grammatical errors  Occasional wrong word or "sound-a-like" substitutions may have occurred due to the inherent limitations of the voice recognition software  Read the chart carefully and recognize, using context, where substitutions may have occurred  If you have any questions, please contact the dictating provider for clarification or correction, as needed  This encounter has been coded by a non-certified coder         Mally Garcia MD    Date: 11/15/2018 Time: 10:55 AM

## 2018-11-23 ENCOUNTER — TELEPHONE (OUTPATIENT)
Dept: SURGERY | Facility: CLINIC | Age: 53
End: 2018-11-23

## 2018-11-23 ENCOUNTER — DOCUMENTATION (OUTPATIENT)
Dept: SURGERY | Facility: CLINIC | Age: 53
End: 2018-11-23

## 2018-11-23 NOTE — TELEPHONE ENCOUNTER
Patient came to office for packing change  Patient stated that he spoke with Dr Aydin Quarles in regards to switching his antibiotics  He feels they are ineffective since they are the ones he was given prior to surgery  Per provider, patient should continue antibiotics until completion  No nausea or vomiting  Patient stated his temperature was 100 degrees yesterday  Patient will be coming back to office on 11/25 for packing change

## 2018-11-23 NOTE — PROGRESS NOTES
Patient S/P Right Axillary Lymph Node Excision 11/21  Patient came to office for packing change  Removed outer bandage  Removed original packing  Irrigated wound  Replaced with 1 4x4 wet gauze  Covered with ABD bandage  Patient will be coming back to office on 11/25 for packing change  Instructed patient to take pain meds prior to Monday's packing changePatient stated that he spoke with Dr Dionisio Aguilar in regards to switching his antibiotics  He feels they are ineffective since they are the ones he was given prior to surgery  Per provider, patient should continue antibiotics until completion  No nausea or vomiting   Patient stated his temperature was 100 degrees yesterday

## 2018-11-24 LAB
BACTERIA SPEC ANAEROBE CULT: NO GROWTH
BACTERIA TISS AEROBE CULT: NO GROWTH
GRAM STN SPEC: NORMAL
GRAM STN SPEC: NORMAL

## 2018-11-26 ENCOUNTER — OFFICE VISIT (OUTPATIENT)
Dept: SURGERY | Facility: CLINIC | Age: 53
End: 2018-11-26

## 2018-11-26 VITALS
SYSTOLIC BLOOD PRESSURE: 158 MMHG | RESPIRATION RATE: 18 BRPM | BODY MASS INDEX: 21.9 KG/M2 | WEIGHT: 153 LBS | TEMPERATURE: 98.3 F | HEART RATE: 70 BPM | HEIGHT: 70 IN | DIASTOLIC BLOOD PRESSURE: 79 MMHG

## 2018-11-26 DIAGNOSIS — R59.0 LYMPHADENOPATHY, AXILLARY: Primary | ICD-10-CM

## 2018-11-26 PROCEDURE — 99024 POSTOP FOLLOW-UP VISIT: CPT | Performed by: SURGERY

## 2018-11-26 NOTE — PROGRESS NOTES
Assessment/Plan:   Timur Kilgore is a 45 y o male who comes in today for postoperative check  S/p excisional biopsy of lymph node of right axilla      Pathology:  Pending    Postoperative restrictions reviewed  All questions answered  Wound left open at time of surgical intervention  Wound redressed and irrigated at time of visit  Local wound care discussed  ____________________________________________________________    HPI:  Timur Kilgore is a 45 y o male who comes in today for postoperative check after recent surgery for excisional biopsy of lymph node of right axilla  Currently doing well with some problems :  Open wound right axilla, no fever, no fever or chills,no nausea and no vomiting  Reports minor tenderness at right axilla and minimal drainage  ROS:  General ROS: negative for - chills, fatigue, fever or night sweats, weight loss  Respiratory ROS: no cough, shortness of breath, or wheezing  Cardiovascular ROS: no chest pain or dyspnea on exertion  Genito-Urinary ROS: no dysuria, trouble voiding, or hematuria  Musculoskeletal ROS: negative for - gait disturbance, joint pain or muscle pain  Neurological ROS: no TIA or stroke symptoms  GI ROS: see HPI  Skin ROS: no new rashes or lesions   Lymphatic ROS: no new adenopathy noted by pt  GYN ROS: see HPI, no new GYN history or bleeding noted  Psy ROS: no new mental or behavioral disturbances       Patient Active Problem List   Diagnosis    Hypertension    Old myocardial infarct    Depressed    CAD (coronary artery disease)    Anxiety    Chest pain    Mixed hyperlipidemia    Adenopathy         Allergies:  Patient has no known allergies  Current Outpatient Prescriptions:     acetaminophen (TYLENOL) 500 mg tablet, Take 500 mg by mouth every 12 (twelve) hours as needed for mild pain, Disp: , Rfl:     aspirin 81 MG tablet, Take 81 mg by mouth daily  , Disp: , Rfl:     atorvastatin (LIPITOR) 40 mg tablet, Take 40 mg by mouth , Disp: , Rfl:     cephalexin (KEFLEX) 500 mg capsule, Take 1 capsule (500 mg total) by mouth every 6 (six) hours for 10 days, Disp: 40 capsule, Rfl: 0    gabapentin (NEURONTIN) 600 MG tablet, Take 600 mg by mouth 3 (three) times a day , Disp: , Rfl:     HYDROcodone-acetaminophen (NORCO) 5-325 mg per tablet, Take 1 tablet by mouth every 4 (four) hours as needed for pain for up to 10 doses Max Daily Amount: 6 tablets, Disp: 10 tablet, Rfl: 0    ibuprofen (MOTRIN) 600 mg tablet, Take 1 tablet (600 mg total) by mouth every 8 (eight) hours as needed for mild pain for up to 10 days, Disp: 30 tablet, Rfl: 0    lisinopril (ZESTRIL) 20 mg tablet, Take 20 mg by mouth 2 (two) times a day, Disp: , Rfl:     LORazepam (ATIVAN) 1 mg tablet, Take 1 mg by mouth 2 (two) times a day  , Disp: , Rfl:     methocarbamol (ROBAXIN) 750 mg tablet, Take 750 mg by mouth 3 (three) times a day , Disp: , Rfl:     mirtazapine (REMERON) 45 MG tablet, Take 45 mg by mouth daily at bedtime  , Disp: , Rfl:     nitroglycerin (NITROSTAT) 0 4 mg SL tablet, Place 1 tablet (0 4 mg total) under the tongue every 5 (five) minutes as needed for chest pain, Disp: 25 tablet, Rfl: 3    omeprazole (PriLOSEC) 20 mg delayed release capsule, Take 20 mg by mouth 2 (two) times a day , Disp: , Rfl:     QUEtiapine (SEROquel) 100 mg tablet, Take 100 mg by mouth daily at bedtime  , Disp: , Rfl:     rOPINIRole (REQUIP) 1 mg tablet, Take 1 mg by mouth daily  , Disp: , Rfl:     traMADol (ULTRAM) 50 mg tablet, Take 50 mg by mouth daily  , Disp: , Rfl:     triamcinolone (KENALOG) 0 1 % cream, Apply topically 2 (two) times a day , Disp: , Rfl:     VIIBRYD 40 MG tablet, Take 40 mg by mouth daily with breakfast  , Disp: , Rfl:     ZETIA 10 MG tablet, , Disp: , Rfl:     Past Medical History:   Diagnosis Date    [facility]ina pectoris ([facility] [address] )     Anxiety     Arthritis     Back pain     CAD (coronary artery disease)     DDD (degenerative disc disease), lumbosacral     Depressed     Full dentures     GERD (gastroesophageal reflux disease)     Hyperlipidemia     Hypertension     MI (myocardial infarction) ([address] )     2009    Wears glasses        Past Surgical History:   Procedure Laterality Date    ANGIOPLASTY  2009     LAD    CARDIAC CATHETERIZATION      PA REMOVE ARMPIT LYMPH NODES SUPERFIC Right 11/21/2018    Procedure: EXCISION BIOPSY LYMPH NODE AXILLARY RIGHT;  Surgeon: Kavita Perkins MD;  Location: [facility];  Service: General       Family History   Problem Relation Age of Onset    Heart attack Father     Heart disease Brother         reports that he quit smoking about 10 years ago  He has never used smokeless tobacco  He reports that he does not drink alcohol or use drugs  Invalid input(s):  EOSPCT          Invalid input(s): LABALBU    Imaging: No new pertinent imaging studies  PHYSICAL EXAM  General: normal, cooperative, no distress  Incision:  Open with scant serous sanguinous drainage      Some portions of this record may have been generated with voice recognition software  There may be translation, syntax,  or grammatical errors  Occasional wrong word or "sound-a-like" substitutions may have occurred due to the inherent limitations of the voice recognition software  Read the chart carefully and recognize, using context, where substitutions may have occurred  If you have any questions, please contact the dictating provider for clarification or correction, as needed  This encounter has been coded by a non-certified coder         Kavita Perkins MD    Date: 11/26/2018 Time: 11:13 AM

## 2018-11-26 NOTE — LETTER
November 26, 2018     Jesus Madrigal, DO  [address]   [address]  [place] [address]    Patient: Prince Claros   YOB: 1965   Date of Visit: 11/26/2018       Dear Dr Jackie Pope: Thank you for referring Prince Claros to me for evaluation  Below are my notes for this consultation  If you have questions, please do not hesitate to call me  I look forward to following your patient along with you  Sincerely,        Sanjuana Torrez MD        CC: No Recipients  Phoenix Murray  11/26/2018 11:19 AM  Sign at close encounter  Assessment/Plan:   Prince Claros is a 45 y o male who comes in today for postoperative check  S/p excisional biopsy of lymph node of right axilla      Pathology:  Pending    Postoperative restrictions reviewed  All questions answered  Wound left open at time of surgical intervention  Wound redressed and irrigated at time of visit  Local wound care discussed  ____________________________________________________________    HPI:  Prince Claros is a 45 y o male who comes in today for postoperative check after recent surgery for excisional biopsy of lymph node of right axilla  Currently doing well with some problems :  Open wound right axilla, no fever, no fever or chills,no nausea and no vomiting  Reports minor tenderness at right axilla and minimal drainage  ROS:  General ROS: negative for - chills, fatigue, fever or night sweats, weight loss  Respiratory ROS: no cough, shortness of breath, or wheezing  Cardiovascular ROS: no chest pain or dyspnea on exertion  Genito-Urinary ROS: no dysuria, trouble voiding, or hematuria  Musculoskeletal ROS: negative for - gait disturbance, joint pain or muscle pain  Neurological ROS: no TIA or stroke symptoms  GI ROS: see HPI  Skin ROS: no new rashes or lesions   Lymphatic ROS: no new adenopathy noted by pt     GYN ROS: see HPI, no new GYN history or bleeding noted  Psy ROS: no new mental or behavioral disturbances       Patient Active Problem List   Diagnosis    Hypertension    Old myocardial infarct    Depressed    CAD (coronary artery disease)    Anxiety    Chest pain    Mixed hyperlipidemia    Adenopathy         Allergies:  Patient has no known allergies  Current Outpatient Prescriptions:     acetaminophen (TYLENOL) 500 mg tablet, Take 500 mg by mouth every 12 (twelve) hours as needed for mild pain, Disp: , Rfl:     aspirin 81 MG tablet, Take 81 mg by mouth daily  , Disp: , Rfl:     atorvastatin (LIPITOR) 40 mg tablet, Take 40 mg by mouth , Disp: , Rfl:     cephalexin (KEFLEX) 500 mg capsule, Take 1 capsule (500 mg total) by mouth every 6 (six) hours for 10 days, Disp: 40 capsule, Rfl: 0    gabapentin (NEURONTIN) 600 MG tablet, Take 600 mg by mouth 3 (three) times a day , Disp: , Rfl:     HYDROcodone-acetaminophen (NORCO) 5-325 mg per tablet, Take 1 tablet by mouth every 4 (four) hours as needed for pain for up to 10 doses Max Daily Amount: 6 tablets, Disp: 10 tablet, Rfl: 0    ibuprofen (MOTRIN) 600 mg tablet, Take 1 tablet (600 mg total) by mouth every 8 (eight) hours as needed for mild pain for up to 10 days, Disp: 30 tablet, Rfl: 0    lisinopril (ZESTRIL) 20 mg tablet, Take 20 mg by mouth 2 (two) times a day, Disp: , Rfl:     LORazepam (ATIVAN) 1 mg tablet, Take 1 mg by mouth 2 (two) times a day  , Disp: , Rfl:     methocarbamol (ROBAXIN) 750 mg tablet, Take 750 mg by mouth 3 (three) times a day , Disp: , Rfl:     mirtazapine (REMERON) 45 MG tablet, Take 45 mg by mouth daily at bedtime  , Disp: , Rfl:     nitroglycerin (NITROSTAT) 0 4 mg SL tablet, Place 1 tablet (0 4 mg total) under the tongue every 5 (five) minutes as needed for chest pain, Disp: 25 tablet, Rfl: 3    omeprazole (PriLOSEC) 20 mg delayed release capsule, Take 20 mg by mouth 2 (two) times a day , Disp: , Rfl:     QUEtiapine (SEROquel) 100 mg tablet, Take 100 mg by mouth daily at bedtime  , Disp: , Rfl:    rOPINIRole (REQUIP) 1 mg tablet, Take 1 mg by mouth daily  , Disp: , Rfl:     traMADol (ULTRAM) 50 mg tablet, Take 50 mg by mouth daily  , Disp: , Rfl:     triamcinolone (KENALOG) 0 1 % cream, Apply topically 2 (two) times a day , Disp: , Rfl:     VIIBRYD 40 MG tablet, Take 40 mg by mouth daily with breakfast  , Disp: , Rfl:     ZETIA 10 MG tablet, , Disp: , Rfl:     Past Medical History:   Diagnosis Date    [facility]is ([facility] [address] )     Anxiety     Arthritis     Back pain     CAD (coronary artery disease)     DDD (degenerative disc disease), lumbosacral     Depressed     Full dentures     GERD (gastroesophageal reflux disease)     Hyperlipidemia     Hypertension     MI (myocardial infarction) ([facility] [address] )     2009    Wears glasses        Past Surgical History:   Procedure Laterality Date    ANGIOPLASTY  2009     LAD    CARDIAC CATHETERIZATION      CA REMOVE ARMPIT LYMPH NODES SUPERFIC Right 11/21/2018    Procedure: EXCISION BIOPSY LYMPH NODE AXILLARY RIGHT;  Surgeon: Orlando Birmingham MD;  Location: [facility];  Service: General       Family History   Problem Relation Age of Onset    Heart attack Father     Heart disease Brother         reports that he quit smoking about 10 years ago  He has never used smokeless tobacco  He reports that he does not drink alcohol or use drugs  Invalid input(s):  EOSPCT          Invalid input(s): LABALBU    Imaging: No new pertinent imaging studies  PHYSICAL EXAM  General: normal, cooperative, no distress  Incision:  Open with scant serous sanguinous drainage      Some portions of this record may have been generated with voice recognition software  There may be translation, syntax,  or grammatical errors  Occasional wrong word or "sound-a-like" substitutions may have occurred due to the inherent limitations of the voice recognition software  Read the chart carefully and recognize, using context, where substitutions may have occurred   If you have any questions, please contact the dictating provider for clarification or correction, as needed  This encounter has been coded by a non-certified coder         Shakila Beltran MD    Date: 11/26/2018 Time: 11:13 AM

## 2018-11-27 NOTE — TELEPHONE ENCOUNTER
Patient seen in office for wound check and packing change yesterday  As of today path still pending

## 2018-11-29 LAB — SCAN RESULT: NORMAL

## 2018-12-03 ENCOUNTER — OFFICE VISIT (OUTPATIENT)
Dept: SURGERY | Facility: CLINIC | Age: 53
End: 2018-12-03

## 2018-12-03 VITALS
TEMPERATURE: 97.8 F | WEIGHT: 153 LBS | RESPIRATION RATE: 18 BRPM | SYSTOLIC BLOOD PRESSURE: 120 MMHG | BODY MASS INDEX: 21.9 KG/M2 | HEIGHT: 70 IN | HEART RATE: 90 BPM | DIASTOLIC BLOOD PRESSURE: 78 MMHG

## 2018-12-03 DIAGNOSIS — Z51.89 ENCOUNTER FOR WOUND CARE: Primary | ICD-10-CM

## 2018-12-03 PROCEDURE — 99024 POSTOP FOLLOW-UP VISIT: CPT | Performed by: SURGERY

## 2018-12-03 RX ORDER — EZETIMIBE 10 MG/1
TABLET ORAL
COMMUNITY
Start: 2018-11-26 | End: 2020-09-29 | Stop reason: SDUPTHER

## 2018-12-03 NOTE — PROGRESS NOTES
Assessment/Plan:   Charmaine Flores is a 45 y o male who comes in today for postoperative check  Status post right axillary large abscess drained  It appears the lymph node is negative for any malignancy  Of course there is low viability because of the abscess, however no evidence of lymphoma or other malignancy at this point will treat this as a abscess  The wound is clean and dry measures about 1 cm long and about 4 mm wide and is very shallow and has good granulation tissue  Pathology: Reviewed with patient, all questions answered  Postoperative restrictions reviewed  All questions answered  ____________________________________________________________    HPI:  Charmaine Flores is a 45 y o male who comes in today for postoperative check after recent surgery  Currently doing well without problems, no fever or chills,no nausea and no vomiting  Reports doing well  Dry dressing packing over an infected abscess area which is now clean  Pathology benign  No fevers or chills       ROS:  General ROS: negative for - chills, fatigue, fever or night sweats, weight loss  Respiratory ROS: no cough, shortness of breath, or wheezing  Cardiovascular ROS: no chest pain or dyspnea on exertion  Genito-Urinary ROS: no dysuria, trouble voiding, or hematuria  Musculoskeletal ROS: negative for - gait disturbance, joint pain or muscle pain  Neurological ROS: no TIA or stroke symptoms  GI ROS: see HPI  Skin ROS: no new rashes or lesions   Lymphatic ROS: no new adenopathy noted by pt  GYN ROS: see HPI, no new GYN history or bleeding noted  Psy ROS: no new mental or behavioral disturbances       Patient Active Problem List   Diagnosis    Hypertension    Old myocardial infarct    Depressed    CAD (coronary artery disease)    Anxiety    Chest pain    Mixed hyperlipidemia    Adenopathy         Allergies:  Patient has no known allergies        Current Outpatient Prescriptions:     acetaminophen (TYLENOL) 500 mg tablet, Take 500 mg by mouth every 12 (twelve) hours as needed for mild pain, Disp: , Rfl:     aspirin 81 MG tablet, Take 81 mg by mouth daily  , Disp: , Rfl:     atorvastatin (LIPITOR) 40 mg tablet, Take 40 mg by mouth , Disp: , Rfl:     ezetimibe (ZETIA) 10 mg tablet, TAKE 1 TABLET(10 MG) BY MOUTH DAILY, Disp: , Rfl:     gabapentin (NEURONTIN) 600 MG tablet, Take 600 mg by mouth 3 (three) times a day , Disp: , Rfl:     HYDROcodone-acetaminophen (NORCO) 5-325 mg per tablet, Take 1 tablet by mouth every 4 (four) hours as needed for pain for up to 10 doses Max Daily Amount: 6 tablets, Disp: 10 tablet, Rfl: 0    lisinopril (ZESTRIL) 20 mg tablet, Take 20 mg by mouth 2 (two) times a day, Disp: , Rfl:     LORazepam (ATIVAN) 1 mg tablet, Take 1 mg by mouth 2 (two) times a day  , Disp: , Rfl:     methocarbamol (ROBAXIN) 750 mg tablet, Take 750 mg by mouth 3 (three) times a day , Disp: , Rfl:     mirtazapine (REMERON) 45 MG tablet, Take 45 mg by mouth daily at bedtime  , Disp: , Rfl:     nitroglycerin (NITROSTAT) 0 4 mg SL tablet, Place 1 tablet (0 4 mg total) under the tongue every 5 (five) minutes as needed for chest pain, Disp: 25 tablet, Rfl: 3    omeprazole (PriLOSEC) 20 mg delayed release capsule, Take 20 mg by mouth 2 (two) times a day , Disp: , Rfl:     QUEtiapine (SEROquel) 100 mg tablet, Take 100 mg by mouth daily at bedtime  , Disp: , Rfl:     rOPINIRole (REQUIP) 1 mg tablet, Take 1 mg by mouth daily  , Disp: , Rfl:     traMADol (ULTRAM) 50 mg tablet, Take 50 mg by mouth daily  , Disp: , Rfl:     triamcinolone (KENALOG) 0 1 % cream, Apply topically 2 (two) times a day , Disp: , Rfl:     VIIBRYD 40 MG tablet, Take 40 mg by mouth daily with breakfast  , Disp: , Rfl:     ZETIA 10 MG tablet, , Disp: , Rfl:     ibuprofen (MOTRIN) 600 mg tablet, Take 1 tablet (600 mg total) by mouth every 8 (eight) hours as needed for mild pain for up to 10 days, Disp: 30 tablet, Rfl: 0    Past Medical History: Diagnosis Date    Ang[facility]is ([facility] [address] )     Anxiety     Arthritis     Back pain     CAD (coronary artery disease)     DDD (degenerative disc disease), lumbosacral     Depressed     Full dentures     GERD (gastroesophageal reflux disease)     Hyperlipidemia     Hypertension     MI (myocardial infarction) ([facility] [address] )     2009    Wears glasses        Past Surgical History:   Procedure Laterality Date    ANGIOPLASTY  2009     LAD    CARDIAC CATHETERIZATION      [place] REMOVE ARMPIT LYMPH NODES SUPERFIC Right 11/21/2018    Procedure: EXCISION BIOPSY LYMPH NODE AXILLARY RIGHT;  Surgeon: Etelvina Belle MD;  Location: [facility];  Service: General       Family History   Problem Relation Age of Onset    Heart attack Father     Heart disease Brother         reports that he quit smoking about 10 years ago  He has never used smokeless tobacco  He reports that he does not drink alcohol or use drugs  Invalid input(s):  EOSPCT          Invalid input(s): LABALBU    Imaging: I personally reviewed the radiology studies, my impression is as follows:   PHYSICAL EXAM  General: normal, cooperative, no distress  Incision: clean, dry, and intact and healing well      Some portions of this record may have been generated with voice recognition software  There may be translation, syntax,  or grammatical errors  Occasional wrong word or "sound-a-like" substitutions may have occurred due to the inherent limitations of the voice recognition software  Read the chart carefully and recognize, using context, where substitutions may have occurred  If you have any questions, please contact the dictating provider for clarification or correction, as needed  This encounter has been coded by a non-certified coder         Etelvina Belle MD    Date: 12/3/2018 Time: 11:22 AM

## 2018-12-03 NOTE — LETTER
December 3, 2018     Alf Lara DO  [address]   [address]  [place] [address]    Patient: Lacie Calderon   YOB: 1965   Date of Visit: 12/3/2018       Dear Dr Alla Núñez: Thank you for referring Lacie Calderon to me for evaluation  Below are my notes for this consultation  If you have questions, please do not hesitate to call me  I look forward to following your patient along with you  Sincerely,        Justin Madrigal MD        CC: No Recipients  Justin Madrigal MD  12/3/2018 11:24 AM  Sign at close encounter  Assessment/Plan:   Lacie Calderon is a 45 y o male who comes in today for postoperative check  Status post right axillary large abscess drained  It appears the lymph node is negative for any malignancy  Of course there is low viability because of the abscess, however no evidence of lymphoma or other malignancy at this point will treat this as a abscess  The wound is clean and dry measures about 1 cm long and about 4 mm wide and is very shallow and has good granulation tissue  Pathology: Reviewed with patient, all questions answered  Postoperative restrictions reviewed  All questions answered  ____________________________________________________________    HPI:  Lacie Calderon is a 45 y o male who comes in today for postoperative check after recent surgery  Currently doing well without problems, no fever or chills,no nausea and no vomiting  Reports doing well  Dry dressing packing over an infected abscess area which is now clean  Pathology benign  No fevers or chills       ROS:  General ROS: negative for - chills, fatigue, fever or night sweats, weight loss  Respiratory ROS: no cough, shortness of breath, or wheezing  Cardiovascular ROS: no chest pain or dyspnea on exertion  Genito-Urinary ROS: no dysuria, trouble voiding, or hematuria  Musculoskeletal ROS: negative for - gait disturbance, joint pain or muscle pain  Neurological [facility] appt [facility] at [facility] ROS: no TIA or stroke symptoms  GI ROS: see HPI  Skin ROS: no new rashes or lesions   Lymphatic ROS: no new adenopathy noted by pt  GYN ROS: see HPI, no new GYN history or bleeding noted  Psy ROS: no new mental or behavioral disturbances       Patient Active Problem List   Diagnosis    Hypertension    Old myocardial infarct    Depressed    CAD (coronary artery disease)    Anxiety    Chest pain    Mixed hyperlipidemia    Adenopathy         Allergies:  Patient has no known allergies  Current Outpatient Prescriptions:     acetaminophen (TYLENOL) 500 mg tablet, Take 500 mg by mouth every 12 (twelve) hours as needed for mild pain, Disp: , Rfl:     aspirin 81 MG tablet, Take 81 mg by mouth daily  , Disp: , Rfl:     atorvastatin (LIPITOR) 40 mg tablet, Take 40 mg by mouth , Disp: , Rfl:     ezetimibe (ZETIA) 10 mg tablet, TAKE 1 TABLET(10 MG) BY MOUTH DAILY, Disp: , Rfl:     gabapentin (NEURONTIN) 600 MG tablet, Take 600 mg by mouth 3 (three) times a day , Disp: , Rfl:     HYDROcodone-acetaminophen (NORCO) 5-325 mg per tablet, Take 1 tablet by mouth every 4 (four) hours as needed for pain for up to 10 doses Max Daily Amount: 6 tablets, Disp: 10 tablet, Rfl: 0    lisinopril (ZESTRIL) 20 mg tablet, Take 20 mg by mouth 2 (two) times a day, Disp: , Rfl:     LORazepam (ATIVAN) 1 mg tablet, Take 1 mg by mouth 2 (two) times a day  , Disp: , Rfl:     methocarbamol (ROBAXIN) 750 mg tablet, Take 750 mg by mouth 3 (three) times a day , Disp: , Rfl:     mirtazapine (REMERON) 45 MG tablet, Take 45 mg by mouth daily at bedtime  , Disp: , Rfl:     nitroglycerin (NITROSTAT) 0 4 mg SL tablet, Place 1 tablet (0 4 mg total) under the tongue every 5 (five) minutes as needed for chest pain, Disp: 25 tablet, Rfl: 3    omeprazole (PriLOSEC) 20 mg delayed release capsule, Take 20 mg by mouth 2 (two) times a day , Disp: , Rfl:     QUEtiapine (SEROquel) 100 mg tablet, Take 100 mg by mouth daily at bedtime  , Disp: , Rfl:   rOPINIRole (REQUIP) 1 mg tablet, Take 1 mg by mouth daily  , Disp: , Rfl:     traMADol (ULTRAM) 50 mg tablet, Take 50 mg by mouth daily  , Disp: , Rfl:     triamcinolone (KENALOG) 0 1 % cream, Apply topically 2 (two) times a day , Disp: , Rfl:     VIIBRYD 40 MG tablet, Take 40 mg by mouth daily with breakfast  , Disp: , Rfl:     ZETIA 10 MG tablet, , Disp: , Rfl:     ibuprofen (MOTRIN) 600 mg tablet, Take 1 tablet (600 mg total) by mouth every 8 (eight) hours as needed for mild pain for up to 10 days, Disp: 30 tablet, Rfl: 0    Past Medical History:   Diagnosis Date    Angina pectoris (HCC)     Anxiety     Arthritis     Back pain     CAD (coronary artery disease)     DDD (degenerative disc disease), lumbosacral     Depressed     Full dentures     GERD (gastroesophageal reflux disease)     Hyperlipidemia     Hypertension     MI (myocardial infarction) ([facility] [address] )     2009    Wears glasses        Past Surgical History:   Procedure Laterality Date    ANGIOPLASTY  2009     LAD    CARDIAC CATHETERIZATION      [place] REMOVE ARMPIT LYMPH NODES SUPERFIC Right 11/21/2018    Procedure: EXCISION BIOPSY LYMPH NODE AXILLARY RIGHT;  Surgeon: Rafael Tan MD;  [facility]: [facility];  Service: General       Family History   Problem Relation Age of Onset    Heart attack Father     Heart disease Brother         reports that he quit smoking about 10 years ago  He has never used smokeless tobacco  He reports that he does not drink alcohol or use drugs  Invalid input(s):  EOSPCT          Invalid input(s): LABALBU    Imaging: I personally reviewed the radiology studies, my impression is as follows:   PHYSICAL EXAM  General: normal, cooperative, no distress  Incision: clean, dry, and intact and healing well      Some portions of this record may have been generated with voice recognition software  There may be translation, syntax,  or grammatical errors   Occasional wrong word or "sound-a-like" substitutions may have occurred due to the inherent limitations of the voice recognition software  Read the chart carefully and recognize, using context, where substitutions may have occurred  If you have any questions, please contact the dictating provider for clarification or correction, as needed  This encounter has been coded by a non-certified coder         Mikki Rider MD    Date: 12/3/2018 Time: 11:22 AM

## 2018-12-17 ENCOUNTER — OFFICE VISIT (OUTPATIENT)
Dept: SURGERY | Facility: CLINIC | Age: 53
End: 2018-12-17

## 2018-12-17 VITALS
SYSTOLIC BLOOD PRESSURE: 140 MMHG | WEIGHT: 153 LBS | HEIGHT: 70 IN | HEART RATE: 88 BPM | TEMPERATURE: 98 F | BODY MASS INDEX: 21.9 KG/M2 | DIASTOLIC BLOOD PRESSURE: 94 MMHG | RESPIRATION RATE: 18 BRPM

## 2018-12-17 DIAGNOSIS — Z51.89 ENCOUNTER FOR WOUND CARE: Primary | ICD-10-CM

## 2018-12-17 PROCEDURE — 99024 POSTOP FOLLOW-UP VISIT: CPT | Performed by: SURGERY

## 2018-12-17 NOTE — PROGRESS NOTES
Assessment/Plan:   Gertrude Rodarte is a 52 y  o male who comes in today for postoperative check  Pathology: Reviewed with patient, all questions answered  Postoperative restrictions reviewed  All questions answered  ____________________________________________________________    HPI:  Gertrude Rodarte is a 52 y  o male who comes in today for postoperative check after recent surgery  Currently doing well without problems, no fever or chills,no nausea and no vomiting  Reports doing quite well  Wound is almost closed  No longer able to pack it       ROS:  General ROS: negative for - chills, fatigue, fever or night sweats, weight loss  Respiratory ROS: no cough, shortness of breath, or wheezing  Cardiovascular ROS: no chest pain or dyspnea on exertion  Genito-Urinary ROS: no dysuria, trouble voiding, or hematuria  Musculoskeletal ROS: negative for - gait disturbance, joint pain or muscle pain  Neurological ROS: no TIA or stroke symptoms  GI ROS: see HPI  Skin ROS: no new rashes or lesions   Lymphatic ROS: no new adenopathy noted by pt  GYN ROS: see HPI, no new GYN history or bleeding noted  Psy ROS: no new mental or behavioral disturbances       Patient Active Problem List   Diagnosis    Hypertension    Old myocardial infarct    Depressed    CAD (coronary artery disease)    Anxiety    Chest pain    Mixed hyperlipidemia    Adenopathy         Allergies:  Patient has no known allergies  Current Outpatient Prescriptions:     aspirin 81 MG tablet, Take 81 mg by mouth daily  , Disp: , Rfl:     atorvastatin (LIPITOR) 40 mg tablet, Take 40 mg by mouth , Disp: , Rfl:     ezetimibe (ZETIA) 10 mg tablet, TAKE 1 TABLET(10 MG) BY MOUTH DAILY, Disp: , Rfl:     gabapentin (NEURONTIN) 600 MG tablet, Take 600 mg by mouth 3 (three) times a day , Disp: , Rfl:     lisinopril (ZESTRIL) 20 mg tablet, Take 20 mg by mouth 2 (two) times a day, Disp: , Rfl:     LORazepam (ATIVAN) 1 mg tablet, Take 1 mg by mouth 2 (two) times a day  , Disp: , Rfl:     methocarbamol (ROBAXIN) 750 mg tablet, Take 750 mg by mouth 3 (three) times a day , Disp: , Rfl:     mirtazapine (REMERON) 45 MG tablet, Take 45 mg by mouth daily at bedtime  , Disp: , Rfl:     nitroglycerin (NITROSTAT) 0 4 mg SL tablet, Place 1 tablet (0 4 mg total) under the tongue every 5 (five) minutes as needed for chest pain, Disp: 25 tablet, Rfl: 3    omeprazole (PriLOSEC) 20 mg delayed release capsule, Take 20 mg by mouth 2 (two) times a day , Disp: , Rfl:     QUEtiapine (SEROquel) 100 mg tablet, Take 100 mg by mouth daily at bedtime  , Disp: , Rfl:     rOPINIRole (REQUIP) 1 mg tablet, Take 1 mg by mouth daily  , Disp: , Rfl:     traMADol (ULTRAM) 50 mg tablet, Take 50 mg by mouth daily  , Disp: , Rfl:     triamcinolone (KENALOG) 0 1 % cream, Apply topically 2 (two) times a day , Disp: , Rfl:     VIIBRYD 40 MG tablet, Take 40 mg by mouth daily with breakfast  , Disp: , Rfl:     acetaminophen (TYLENOL) 500 mg tablet, Take 500 mg by mouth every 12 (twelve) hours as needed for mild pain, Disp: , Rfl:     HYDROcodone-acetaminophen (NORCO) 5-325 mg per tablet, Take 1 tablet by mouth every 4 (four) hours as needed for pain for up to 10 doses Max Daily Amount: 6 tablets (Patient not taking: Reported on 12/17/2018 ), Disp: 10 tablet, Rfl: 0    ibuprofen (MOTRIN) 600 mg tablet, Take 1 tablet (600 mg total) by mouth every 8 (eight) hours as needed for mild pain for up to 10 days, Disp: 30 tablet, Rfl: 0    Past Medical History:   Diagnosis Date    Angina pectoris (HCC)     Anxiety     Arthritis     Back pain     CAD (coronary artery disease)     DDD (degenerative disc disease), lumbosacral     Depressed     Full dentures     GERD (gastroesophageal reflux disease)     Hyperlipidemia     Hypertension     MI (my[facility] infarction) ([facility] [address] )     2009    Wears glasses        Past Surgical History:   Procedure Laterality Date    ANGIOPLASTY  2009     LAD    CARDIAC CATHETERIZATION      [place] REMOVE ARMPIT LYMPH NODES SUPERFIC Right 11/21/2018    Procedure: EXCISION BIOPSY LYMPH NODE AXILLARY RIGHT;  Surgeon: Raheem Alvarado MD;  Location: [facility];  Service: General       Family History   Problem Relation Age of Onset    Heart attack Father     Heart disease Brother         reports that he quit smoking about 10 years ago  He has never used smokeless tobacco  He reports that he does not drink alcohol or use drugs  Invalid input(s):  EOSPCT          Invalid input(s): LABALBU    Imaging: I personally reviewed the radiology studies, my impression is as follows:   PHYSICAL EXAM  General: normal, cooperative, no distress  Incision: clean, dry, and intact and healing well      Some portions of this record may have been generated with voice recognition software  There may be translation, syntax,  or grammatical errors  Occasional wrong word or "sound-a-like" substitutions may have occurred due to the inherent limitations of the voice recognition software  Read the chart carefully and recognize, using context, where substitutions may have occurred  If you have any questions, please contact the dictating provider for clarification or correction, as needed  This encounter has been coded by a non-certified coder         Raheem Alvarado MD    Date: 12/17/2018 Time: 11:24 AM

## 2018-12-17 NOTE — LETTER
December 17, 2018     August Khanna DO  [id number]   [address]  [place] [address]    Patient: Julisa Owusu   YOB: 1965   Date of Visit: 12/17/2018       Dear Dr Alirio Guzman: Thank you for referring Julisa Owusu to me for evaluation  Below are my notes for this consultation  If you have questions, please do not hesitate to call me  I look forward to following your patient along with you  Sincerely,        Linda Montalvo MD        CC: No Recipients  Linda Montalvo MD  12/17/2018 11:27 AM  Sign at close encounter  Assessment/Plan:   Julisa Owusu is a 52 y  o male who comes in today for postoperative check  Pathology: Reviewed with patient, all questions answered  Postoperative restrictions reviewed  All questions answered  ____________________________________________________________    HPI:  Julisa Owusu is a 52 y  o male who comes in today for postoperative check after recent surgery  Currently doing well without problems, no fever or chills,no nausea and no vomiting  Reports doing quite well  Wound is almost closed  No longer able to pack it       ROS:  General ROS: negative for - chills, fatigue, fever or night sweats, weight loss  Respiratory ROS: no cough, shortness of breath, or wheezing  Cardiovascular ROS: no chest pain or dyspnea on exertion  Genito-Urinary ROS: no dysuria, trouble voiding, or hematuria  Musculoskeletal ROS: negative for - gait disturbance, joint pain or muscle pain  Neurological ROS: no TIA or stroke symptoms  GI ROS: see HPI  Skin ROS: no new rashes or lesions   Lymphatic ROS: no new adenopathy noted by pt     GYN ROS: see HPI, no new GYN history or bleeding noted  Psy ROS: no new mental or behavioral disturbances       Patient Active Problem List   Diagnosis    Hypertension    Old myocardial infarct    Depressed    CAD (coronary artery disease)    Anxiety    Chest pain    Mixed hyperlipidemia    Adenopathy Allergies:  Patient has no known allergies  Current Outpatient Prescriptions:     aspirin 81 MG tablet, Take 81 mg by mouth daily  , Disp: , Rfl:     atorvastatin (LIPITOR) 40 mg tablet, Take 40 mg by mouth , Disp: , Rfl:     ezetimibe (ZETIA) 10 mg tablet, TAKE 1 TABLET(10 MG) BY MOUTH DAILY, Disp: , Rfl:     gabapentin (NEURONTIN) 600 MG tablet, Take 600 mg by mouth 3 (three) times a day , Disp: , Rfl:     lisinopril (ZESTRIL) 20 mg tablet, Take 20 mg by mouth 2 (two) times a day, Disp: , Rfl:     LORazepam (ATIVAN) 1 mg tablet, Take 1 mg by mouth 2 (two) times a day  , Disp: , Rfl:     methocarbamol (ROBAXIN) 750 mg tablet, Take 750 mg by mouth 3 (three) times a day , Disp: , Rfl:     mirtazapine (REMERON) 45 MG tablet, Take 45 mg by mouth daily at bedtime  , Disp: , Rfl:     nitroglycerin (NITROSTAT) 0 4 mg SL tablet, Place 1 tablet (0 4 mg total) under the tongue every 5 (five) minutes as needed for chest pain, Disp: 25 tablet, Rfl: 3    omeprazole (PriLOSEC) 20 mg delayed release capsule, Take 20 mg by mouth 2 (two) times a day , Disp: , Rfl:     QUEtiapine (SEROquel) 100 mg tablet, Take 100 mg by mouth daily at bedtime  , Disp: , Rfl:     rOPINIRole (REQUIP) 1 mg tablet, Take 1 mg by mouth daily  , Disp: , Rfl:     traMADol (ULTRAM) 50 mg tablet, Take 50 mg by mouth daily  , Disp: , Rfl:     triamcinolone (KENALOG) 0 1 % cream, Apply topically 2 (two) times a day , Disp: , Rfl:     VIIBRYD 40 MG tablet, Take 40 mg by mouth daily with breakfast  , Disp: , Rfl:     acetaminophen (TYLENOL) 500 mg tablet, Take 500 mg by mouth every 12 (twelve) hours as needed for mild pain, Disp: , Rfl:     HYDROcodone-acetaminophen (NORCO) 5-325 mg per tablet, Take 1 tablet by mouth every 4 (four) hours as needed for pain for up to 10 doses Max Daily Amount: 6 tablets (Patient not taking: Reported on 12/17/2018 ), Disp: 10 tablet, Rfl: 0    ibuprofen (MOTRIN) 600 mg tablet, Take 1 tablet (600 mg total) by mouth every 8 (eight) hours as needed for mild pain for up to 10 days, Disp: 30 tablet, Rfl: 0    Past Medical History:   Diagnosis Date    Angina pectoris (HCC)     Anxiety     Arthritis     Back pain     CAD (coronary artery disease)     DDD (degenerative disc disease), lumbosacral     Depressed     Full dentures     GERD (gastroesophageal reflux disease)     Hyperlipidemia     Hypertension     MI (myocardial infarction) ([facility] [address] )     2009    Wears glasses        Past Surgical History:   Procedure Laterality Date    ANGIOPLASTY  2009     LAD    CARDIAC CATHETERIZATION      [place] REMOVE ARMPIT LYMPH NODES SUPERFIC Right 11/21/2018    Procedure: EXCISION BIOPSY LYMPH NODE AXILLARY RIGHT;  Surgeon: Juwan Baldwin MD;  Location: [facility] OR;  Service: General       Family History   Problem Relation Age of Onset    Heart attack Father     Heart disease Brother         reports that he quit smoking about 10 years ago  He has never used smokeless tobacco  He reports that he does not drink alcohol or use drugs  Invalid input(s):  EOSPCT          Invalid input(s): LABALBU    Imaging: I personally reviewed the radiology studies, my impression is as follows:   PHYSICAL EXAM  General: normal, cooperative, no distress  Incision: clean, dry, and intact and healing well      Some portions of this record may have been generated with voice recognition software  There may be translation, syntax,  or grammatical errors  Occasional wrong word or "sound-a-like" substitutions may have occurred due to the inherent limitations of the voice recognition software  Read the chart carefully and recognize, using context, where substitutions may have occurred  If you have any questions, please contact the dictating provider for clarification or correction, as needed  This encounter has been coded by a non-certified coder         Juwan Baldwin MD    Date: 12/17/2018 Time: 11:24 AM

## 2019-02-21 ENCOUNTER — HOSPITAL ENCOUNTER (OUTPATIENT)
Dept: NUCLEAR MEDICINE | Facility: HOSPITAL | Age: 54
Discharge: HOME | End: 2019-02-21
Attending: INTERNAL MEDICINE
Payer: COMMERCIAL

## 2019-02-21 ENCOUNTER — HOSPITAL ENCOUNTER (OUTPATIENT)
Dept: NON INVASIVE DIAGNOSTICS | Facility: HOSPITAL | Age: 54
Discharge: HOME | End: 2019-02-21
Attending: INTERNAL MEDICINE
Payer: COMMERCIAL

## 2019-02-21 DIAGNOSIS — I25.10 CORONARY ARTERY DISEASE INVOLVING NATIVE HEART, ANGINA PRESENCE UNSPECIFIED, UNSPECIFIED VESSEL OR LESION TYPE: ICD-10-CM

## 2019-02-21 PROCEDURE — 78452 HT MUSCLE IMAGE SPECT MULT: CPT | Performed by: INTERNAL MEDICINE

## 2019-02-21 PROCEDURE — 93018 CV STRESS TEST I&R ONLY: CPT | Performed by: INTERNAL MEDICINE

## 2019-02-21 PROCEDURE — 93016 CV STRESS TEST SUPVJ ONLY: CPT | Performed by: INTERNAL MEDICINE

## 2019-02-21 PROCEDURE — 78452 HT MUSCLE IMAGE SPECT MULT: CPT

## 2019-02-21 PROCEDURE — 93017 CV STRESS TEST TRACING ONLY: CPT

## 2019-02-21 PROCEDURE — A9502 TC99M TETROFOSMIN: HCPCS

## 2019-02-21 RX ADMIN — REGADENOSON 0.4 MG: 0.08 INJECTION, SOLUTION INTRAVENOUS at 10:17

## 2019-02-25 LAB
CHEST PAIN STATEMENT: NORMAL
MAX DIASTOLIC BP: 98 MMHG
MAX HEART RATE: 120 BPM
MAX PREDICTED HEART RATE: 165 BPM
MAX. SYSTOLIC BP: 174 MMHG
PROTOCOL NAME: NORMAL
REASON FOR TERMINATION: NORMAL
TARGET HR FORMULA: NORMAL
TEST INDICATION: NORMAL
TIME IN EXERCISE PHASE: NORMAL

## 2019-09-17 ENCOUNTER — OFFICE VISIT (OUTPATIENT)
Dept: CARDIOLOGY CLINIC | Facility: CLINIC | Age: 54
End: 2019-09-17
Payer: COMMERCIAL

## 2019-09-17 VITALS
HEART RATE: 92 BPM | HEIGHT: 70 IN | DIASTOLIC BLOOD PRESSURE: 62 MMHG | SYSTOLIC BLOOD PRESSURE: 115 MMHG | WEIGHT: 160 LBS | BODY MASS INDEX: 22.9 KG/M2

## 2019-09-17 DIAGNOSIS — E78.2 MIXED HYPERLIPIDEMIA: ICD-10-CM

## 2019-09-17 DIAGNOSIS — I25.10 CORONARY ARTERY DISEASE INVOLVING NATIVE CORONARY ARTERY OF NATIVE HEART WITHOUT ANGINA PECTORIS: Primary | ICD-10-CM

## 2019-09-17 DIAGNOSIS — I10 ESSENTIAL HYPERTENSION: ICD-10-CM

## 2019-09-17 DIAGNOSIS — I25.2 OLD MYOCARDIAL INFARCT: ICD-10-CM

## 2019-09-17 PROCEDURE — 3074F SYST BP LT 130 MM HG: CPT | Performed by: INTERNAL MEDICINE

## 2019-09-17 PROCEDURE — 99213 OFFICE O/P EST LOW 20 MIN: CPT | Performed by: INTERNAL MEDICINE

## 2019-09-17 PROCEDURE — 3078F DIAST BP <80 MM HG: CPT | Performed by: INTERNAL MEDICINE

## 2019-09-17 RX ORDER — LISINOPRIL 40 MG/1
20 TABLET ORAL 2 TIMES DAILY
Refills: 0
Start: 2019-09-17 | End: 2021-10-05 | Stop reason: SDUPTHER

## 2019-09-17 RX ORDER — CLONAZEPAM 1 MG/1
TABLET ORAL
Refills: 1 | COMMUNITY
Start: 2019-08-22

## 2019-09-17 RX ORDER — LISINOPRIL 40 MG/1
TABLET ORAL
Refills: 0 | COMMUNITY
Start: 2019-08-15 | End: 2019-09-17 | Stop reason: SDUPTHER

## 2019-09-17 NOTE — PROGRESS NOTES
Cardiology Follow Up    [facility], [facility] [place]  1965  [id number]  [address] [facility] [id number] [phone]    Reason for visit:  Six-month follow-up for CAD status post anterior myocardial infarction with stenting of the left anterior descending artery in 2009  Also has hypertension and hyperlipidemia      1  Coronary artery disease involving native coronary artery of native heart without angina pectoris     2  Old myocardial infarct     3  Essential hypertension     4  Mixed hyperlipidemia         Interval History:   Since the patient's last visit, he continues to get random atypical chest pain unrelated to activity  This can last up to 15 minutes  This happens every few months  He does take a nitroglycerin which makes him feel better but does take that discomfort about 10 minutes to go away  It does not occur with exertion  He has not had much in the way of dyspnea on exertion  He denies palpitations or edema  He does get random dizziness          Patient Active Problem List   Diagnosis    Hypertension    Old myocardial infarct    Depressed    CAD (coronary artery disease)    Anxiety    Chest pain    Mixed hyperlipidemia    Adenopathy     Past Medical History:   Diagnosis Date    Angina pectoris (HCC)     Anxiety     Arthritis     Back pain     CAD (coronary artery disease)     DDD (degenerative disc disease), lumbosacral     Depressed     Full dentures     GERD (gastroesophageal reflux disease)     Hyperlipidemia     Hypertension     [facility]     [facility]    Wears glasses      Social History     Socioeconomic History    Marital status: /Civil Union     Spouse name: Not on file    Number of children: Not on file    Years of education: Not on file    Highest education level: Not on file   Occupational History    Not on file   Social Needs    Financial resource strain: Not on file    Food insecurity:     Worry: Not on file     Inability: Not on file    Transportation needs:     Medical: Not on file     Non-medical: Not on file   Tobacco Use    Smoking status: Former Smoker     Last attempt to quit: 11/19/2008     Years since quitting: 10 8    Smokeless tobacco: Never Used   Substance and Sexual Activity    Alcohol use: No    Drug use: No    Sexual activity: Not on file   Lifestyle    Physical activity:     Days per week: Not on file     Minutes per session: Not on file    Stress: Not on file   Relationships    Social connections:     Talks on phone: Not on file     Gets together: Not on file     Attends [employer] service: Not on file     Active member of club or organization: Not on file     Attends meetings of clubs or organizations: Not on file     Relationship status: Not on file    Intimate partner violence:     Fear of current or ex partner: Not on file     Emotionally abused: Not on file     Physically abused: Not on file     Forced sexual activity: Not on file   Other Topics Concern    Not on file   Social History Narrative    Not on file      Family History   Problem Relation Age of Onset    Heart attack Father     Heart disease Brother      Past Surgical History:   Procedure Laterality Date    ANGIOPLASTY  2009     LAD    CARDIAC CATHETERIZATION      [place] REMOVE ARMPIT LYMPH NODES [address] Right 11/21/2018    Procedure: EXCISION BIOPSY LYMPH NODE AXILLARY RIGHT;  Surgeon: Tatiana Rodriguez MD;  Location: [facility];  Service: General       Current Outpatient Medications:     acetaminophen (TYLENOL) 500 mg tablet, Take 500 mg by mouth every 12 (twelve) hours as needed for mild pain, Disp: , Rfl:     aspirin 81 MG tablet, Take 81 mg by mouth daily  , Disp: , Rfl:     atorvastatin (LIPITOR) 40 mg tablet, Take 40 mg by mouth , Disp: , Rfl:     clonazePAM (KlonoPIN) 1 mg tablet, TK 1/2 T PO BID PRN, Disp: , Rfl: 1    ezetimibe (ZETIA) 10 mg tablet, TAKE 1 TABLET(10 MG) BY MOUTH DAILY, Disp: , Rfl:     gabapentin (NEURONTIN) 600 MG tablet, Take 600 mg by mouth 3 (three) times a day , Disp: , Rfl:     lisinopril (ZESTRIL) 40 mg tablet, , Disp: , Rfl: 0    LORazepam (ATIVAN) 1 mg tablet, Take 1 mg by mouth 2 (two) times a day  , Disp: , Rfl:     methocarbamol (ROBAXIN) 750 mg tablet, Take 750 mg by mouth 3 (three) times a day , Disp: , Rfl:     mirtazapine (REMERON) 45 MG tablet, Take 45 mg by mouth daily at bedtime  , Disp: , Rfl:     nitroglycerin (NITROSTAT) 0 4 mg SL tablet, Place 1 tablet (0 4 mg total) under the tongue every 5 (five) minutes as needed for chest pain, Disp: 25 tablet, Rfl: 3    omeprazole (PriLOSEC) 20 mg delayed release capsule, Take 20 mg by mouth 2 (two) times a day , Disp: , Rfl:     QUEtiapine (SEROquel) 100 mg tablet, Take 100 mg by mouth daily at bedtime  , Disp: , Rfl:     rOPINIRole (REQUIP) 1 mg tablet, Take 1 mg by mouth daily  , Disp: , Rfl:     traMADol (ULTRAM) 50 mg tablet, Take 50 mg by mouth daily  , Disp: , Rfl:     triamcinolone (KENALOG) 0 1 % cream, Apply topically 2 (two) times a day , Disp: , Rfl:     VIIBRYD 40 MG tablet, Take 40 mg by mouth daily with breakfast  , Disp: , Rfl:     HYDROcodone-acetaminophen (NORCO) 5-325 mg per tablet, Take 1 tablet by mouth every 4 (four) hours as needed for pain for up to 10 doses Max Daily Amount: 6 tablets (Patient not taking: Reported on 12/17/2018 ), Disp: 10 tablet, Rfl: 0    ibuprofen (MOTRIN) 600 mg tablet, Take 1 tablet (600 mg total) by mouth every 8 (eight) hours as needed for mild pain for up to 10 days, Disp: 30 tablet, Rfl: 0    lisinopril (ZESTRIL) 20 mg tablet, Take 20 mg by mouth 2 (two) times a day, Disp: , Rfl:     LORazepam (ATIVAN) 2 mg tablet, TK 1/2 TABLET EVERY 6 HOURS AS NEEDED FOR ANXIETY, Disp: , Rfl: 1  No Known Allergies      Review of Systems:  Review of Systems   Constitutional: Positive for fatigue   Negative for unexpected weight change  Respiratory: Negative for shortness of breath  Cardiovascular: Positive for chest pain  Negative for palpitations and leg swelling  Gastrointestinal: Negative for constipation and diarrhea  Genitourinary: Negative for frequency  Musculoskeletal: Positive for back pain  Neurological: Positive for dizziness  Physical Exam:  Vitals:    09/17/19 1551   BP: 115/62   Pulse: 92   Weight: 72 6 kg (160 lb)   Height: 5' 10" (1 778 m)       Physical Exam   Constitutional: He appears well-developed and well-nourished  No distress  HENT:   Head: Normocephalic and atraumatic  Mouth/Throat: Oropharynx is clear and moist  No oropharyngeal exudate  Eyes: Conjunctivae are normal  No scleral icterus  Neck: Neck supple  Normal carotid pulses and no JVD present  Carotid bruit is not present  No thyromegaly present  Cardiovascular: Normal rate, regular rhythm, normal heart sounds and intact distal pulses  Exam reveals no gallop and no friction rub  No murmur heard  Pulmonary/Chest: Breath sounds normal  He has no wheezes  He has no rhonchi  He has no rales  Abdominal: Soft  He exhibits no mass  There is no tenderness  Musculoskeletal: He exhibits no edema  Discussion/Summary:  1  CAD s/p remote MI/stent    Negative stress test earlier this yr    No angina    On aspirin  2  Old MI-apical with good EF    No beta blocker due to depression-is on ACE-inhibitor  3  HTN-w/c on lisinopril  4  Mixed HLP  Ferol Alex LDL in low 70s on atorvastatin and ezetimibe    TGs better with better diet    Fu 6 months      Zahra Cole MD

## 2019-11-19 DIAGNOSIS — I25.10 CORONARY ARTERY DISEASE INVOLVING NATIVE HEART, ANGINA PRESENCE UNSPECIFIED, UNSPECIFIED VESSEL OR LESION TYPE: ICD-10-CM

## 2019-11-19 NOTE — TELEPHONE ENCOUNTER
Phone call from patient requesting refill for nitroglycerin 0 4mg to [address] [facility],  [address]    Last ov 9/17/19  Pending ov 3/24/20

## 2019-11-20 RX ORDER — NITROGLYCERIN 0.4 MG/1
0.4 TABLET SUBLINGUAL
Qty: 25 TABLET | Refills: 3 | Status: SHIPPED | OUTPATIENT
Start: 2019-11-20 | End: 2020-05-26

## 2020-03-23 ENCOUNTER — TELEPHONE (OUTPATIENT)
Dept: CARDIOLOGY CLINIC | Facility: CLINIC | Age: 55
End: 2020-03-23

## 2020-03-24 ENCOUNTER — TELEMEDICINE (OUTPATIENT)
Dept: CARDIOLOGY CLINIC | Facility: CLINIC | Age: 55
End: 2020-03-24
Payer: COMMERCIAL

## 2020-03-24 ENCOUNTER — TELEPHONE (OUTPATIENT)
Dept: CARDIOLOGY CLINIC | Facility: CLINIC | Age: 55
End: 2020-03-24

## 2020-03-24 VITALS
WEIGHT: 162 LBS | HEIGHT: 70 IN | HEART RATE: 90 BPM | BODY MASS INDEX: 23.19 KG/M2 | SYSTOLIC BLOOD PRESSURE: 132 MMHG | DIASTOLIC BLOOD PRESSURE: 82 MMHG

## 2020-03-24 DIAGNOSIS — I25.2 OLD MYOCARDIAL INFARCT: ICD-10-CM

## 2020-03-24 DIAGNOSIS — I25.10 CORONARY ARTERY DISEASE INVOLVING NATIVE CORONARY ARTERY OF NATIVE HEART WITHOUT ANGINA PECTORIS: Primary | ICD-10-CM

## 2020-03-24 DIAGNOSIS — I10 ESSENTIAL HYPERTENSION: ICD-10-CM

## 2020-03-24 DIAGNOSIS — E78.2 MIXED HYPERLIPIDEMIA: ICD-10-CM

## 2020-03-24 PROCEDURE — 99214 OFFICE O/P EST MOD 30 MIN: CPT | Performed by: INTERNAL MEDICINE

## 2020-03-24 NOTE — TELEPHONE ENCOUNTER
Due to the current pandemic of COVID-19, patient was given the option to par take in a video/telephone call, which was accepted by the patient       Patient was informed via telephone,the following:     There is a possibility of a $27 00 fee to participate in this Virtual Visit  This is depending on your insurance company if they will cover that cost       Patients preferred phone number to contact is [phone]      Video option- Patient preferred e-mail: none

## 2020-03-24 NOTE — PROGRESS NOTES
Cardiology   MD Christine Kern MD Ather Mansoor, MD Ignatius Nine, DO, Bindu Hart DO, [facility] [place]  -------------------------------------------------------------------  [facility]  [address] [place] [id number]  [phone]    Telemedicine Visit    Reason for visit is follow up via telephone to reduce risk of COVID-19     This e-visit was done via telephone  03/24/20  Corry Calix  YOB: 1965   MRN: [id number]      Encounter provider Christine Bazan MD     Provider located at :  [facility] [address]     After connecting through telephone, the patient was identified by name and date of birth  Trip Charles was informed that this is a telemedicine visit and that the exam is being conducted confidentially over secure lines  My office door was closed  No one else was in the room  The patient acknowledged consent and understanding of privacy and security of the telephone platform  The patient has agreed to participate and understands they can discontinue the visit at any time  Subjective  Corry Berger is a 62 y o  male with hx of CAD s/p anterior MI with stenting of the LAD in 2009  Justynabandar Noble Also has HTN and HLP    Mild ischemic CM with apical wma and EF 46% 2019     Interval History:  Since the patient's last visit he has done fairly well  He did develop upper respiratory symptoms about 3 and half weeks ago  At that time he had some shortness of breath but no fever  His symptoms have improved significantly and he has no shortness of breath at this time  He did have an episode of chest discomfort 1 week ago which he says was terminated with nitroglycerin  It has not recurred  He does admit to a lot of anxiety as usual   He denies edema  He did have some limited dizziness about a week ago    He denies palpitations but does notice pulse to be in the low 90s which we have noted in the office as well  At his family [de-identified] visit earlier in the month he was placed on hydrochlorothiazide in addition to lisinopril for better blood pressure control  His blood pressures have been fairly favorable on home testing with the most recent reading being 132/80  Other symptoms include perhaps some increased urinary frequency since being put on hydrochlorothiazide  He states he does not have much fatigue  He does have ongoing back pain  We reviewed his cardiac relevant medications including aspirin 81 mg daily, lisinopril 20 mg b i d , hydrochlorothiazide 12 5 mg daily, atorvastatin 40 mg daily, ezetimibe 10 mg daily-uses nitroglycerin p r n  Physical Exam:  Not performed due to telephone format    IMPRESSION/PLAN:  1  CAD-status post remote stenting of the left anterior descending artery  Recent chest pain likely noncardiac since it was limited to 1 episode and has not recurred for 1 week  Continue aspirin  2  Essential Hypertension-apparently well controlled with addition of hydrochlorothiazide 12 5 mg daily to lisinopril 20 mg b i d   Continue same  3  Hyperlipidemia  LDL cholesterol favorable last year when checked in August   Continue atorvastatin and ezetimibe  4  Old apical myocardial infarction ejection fraction slightly reduced at 46%  No evidence for congestive heart failure  On lisinopril  We have refrain from using beta-blockers due to patient's underlying depression      FU 6 months  He has slips for blood work from PCP and will get that done in near future        Fernando Mckenzie MD  --------------------------------------------------------------------------------  [address]  No results found for this or any previous visit    ----------------------------------------------------------------------------------------------  NUCLEAR STRESS TEST: No results found for this or any previous visit    Results for orders placed during the hospital encounter of 19   NM myocardial perfusion spect (rx stress and/or rest)    Narrative [address] [place] [address], [address]  [phone]    Rest/Stress Gated SPECT Myocardial Perfusion Imaging After Regadenoson    Patient: Randy Rand  MR number: [id number]  Account number: [de-identified]  : 1965  Age: 52 years  Gender: Male  Status: Outpatient  Location: Stress lab  Height: 71 in  Weight: 158 lb  BP: 160/ 100 mmHg    Allergies: NO KNOWN ALLERGIES    Diagnosis: R07 9 - Chest pain, unspecified    Primary Physician:  Brisa Ziegler DO  Technician:  Rachel Stout  RN:  Lucina Mcclellan RN  Referring Physician:  Mary Iraheta MD  Group:  [facility]  Report Prepared By[de-identified]  Lucina Mcclellan RN  Interpreting Physician:  Katia Goode DO    INDICATIONS: Evaluation of known coronary artery disease  HISTORY: The patient is a 52year old [facility] male  Chest pain status: chest pain  Coronary artery disease risk factors: dyslipidemia, hypertension, and family history of premature coronary artery disease  Cardiovascular history:  coronary artery disease and prior myocardial infarction  Medications: an ACE inhibitor/ARB, aspirin, and a lipid lowering agent  PHYSICAL EXAM: Baseline physical exam screening: no wheezes audible  REST ECG: Normal sinus rhythm  Nonspecific ST and T wave abnormalities were present  PROCEDURE: The study was performed in the the Stress lab  A regadenoson infusion pharmacologic stress test was performed  Gated SPECT myocardial perfusion imaging was performed after stress  Systolic blood pressure was 160 mmHg, at the  start of the study  Diastolic blood pressure was 100 mmHg, at the start of the study  The heart rate was 89 bpm, at the start of the study  IV double checked    Regadenoson protocol:  HR bpm SBP mmHg DBP mmHg Symptoms  Baseline 89 160 100 none  Immediate 117 174 98 mild dyspnea, flushing  1 min 114 154 88 subsiding  2 min 100 162 100 none  No medications or fluids given  STRESS SUMMARY: Duration of pharmacologic stress was 3 min and 0 sec  Maximal heart rate during stress was 120 bpm  The rate-pressure product for the peak heart rate and blood pressure was 59032  There was no chest pain during stress  The  stress test was terminated due to protocol completion  Pre oxygen saturation: 99 %  Peak oxygen saturation: 99 %  The stress ECG was negative for ischemia and normal  There were no stress arrhythmias or conduction abnormalities  ISOTOPE ADMINISTRATION:  Resting isotope administration Stress isotope administration  Agent Tetrofosmin Tetrofosmin  Dose 10 5 mCi 31 7 mCi  Date 02/21/2019 02/21/2019  Injection time 09:07 10:15  Imaging time 09:38 10:45  Injection-image interval 31 min 30 min    The radiopharmaceutical was injected at the peak effect of pharmacologic stress  MYOCARDIAL PERFUSION IMAGING:  The image quality was good  Left ventricular size was normal  The TID ratio was 1 12  PERFUSION DEFECTS:  -  There was a small, severe, fixed myocardial perfusion defect of the true apical cap extending to the apical anterior myocardial wall segment, with no reversibility  GATED SPECT:  The calculated left ventricular ejection fraction was 46 %  Left ventricular ejection fraction was within normal limits by visual estimate  There was severely reduced myocardial thickening and motion of the true apical wall of the left  ventricle  SUMMARY:  -  Stress results: There was no chest pain during stress  -  ECG conclusions: The stress ECG was negative for ischemia and normal   -  Perfusion imaging: There was a small, severe, fixed myocardial perfusion defect of the true apical cap extending to the apical anterior myocardial wall segment, with no reversibility   -  Gated SPECT: The calculated left ventricular ejection fraction was 46 %   Left ventricular ejection fraction was within normal limits by visual estimate  There was severely reduced myocardial thickening and motion of the true apical wall  of the left ventricle  IMPRESSIONS: Abnormal study after pharmacologic vasodilation  There was evidence of prior myocardial infarction of the true apical cap extending to the apical anterior myocardial wall segment  There was no evidence of myocardial ischemia  Left ventricular systolic function was overall normal with severe hypokinesis of the true apical cap  Prepared and signed by    DO Thomas Norton 02/21/2019 18:04:29         --------------------------------------------------------------------------------  CATH:  No results found for this or any previous visit   --------------------------------------------------------------------------------  ECHO:   No results found for this or any previous visit  No results found for this or any previous visit   --------------------------------------------------------------------------------  HOLTER  No results found for this or any previous visit    No results found for this or any previous visit   --------------------------------------------------------------------------------  CAROTIDS  No results found for this or any previous visit    --------------------------------------------------------------------------------  There are no diagnoses linked to this encounter    ======================================================    Past Medical History:   Diagnosis Date    Angina pectoris ([facility] )     Anxiety     Arthritis     Back pain     CAD (coronary artery disease)     DDD (degenerative disc disease), lumbosacral     Depressed     Full dentures     GERD (gastroesophageal reflux disease)     Hyperlipidemia     Hypertension     MI (myocardial infarction) ([facility] )     2009    Wears glasses      Past Surgical History:   Procedure Laterality Date    ANGIOPLASTY  2009     LAD    CARDIAC CATHETERIZATION      NC REMOVE ARMPIT LYMPH NODES SUPERFIC Right 11/21/2018 Procedure: EXCISION BIOPSY LYMPH NODE AXILLARY RIGHT;  Surgeon: Moncho Srivastava MD;  Location: [facility];  Service: General         Medications  Current Outpatient Medications   Medication Sig Dispense Refill    acetaminophen (TYLENOL) 500 mg tablet Take 500 mg by mouth every 12 (twelve) hours as needed for mild pain      aspirin 81 MG tablet Take 81 mg by mouth daily   atorvastatin (LIPITOR) 40 mg tablet Take 40 mg by mouth   clonazePAM (KlonoPIN) 1 mg tablet TK 1/2 T PO BID PRN  1    ezetimibe (ZETIA) 10 mg tablet       gabapentin (NEURONTIN) 600 MG tablet Take 600 mg by mouth 3 (three) times a day   HYDROCHLOROTHIAZIDE PO Take 12 5 mg by mouth      HYDROcodone-acetaminophen (NORCO) 5-325 mg per tablet Take 1 tablet by mouth every 4 (four) hours as needed for pain for up to 10 doses Max Daily Amount: 6 tablets 10 tablet 0    ibuprofen (MOTRIN) 600 mg tablet Take 1 tablet (600 mg total) by mouth every 8 (eight) hours as needed for mild pain for up to 10 days 30 tablet 0    lisinopril (ZESTRIL) 40 mg tablet Take 0 5 tablets (20 mg total) by mouth 2 (two) times a day  0    methocarbamol (ROBAXIN) 750 mg tablet Take 750 mg by mouth 3 (three) times a day   mirtazapine (REMERON) 45 MG tablet Take 45 mg by mouth daily at bedtime   omeprazole (PriLOSEC) 20 mg delayed release capsule Take 20 mg by mouth 2 (two) times a day   QUEtiapine (SEROquel) 100 mg tablet Take 100 mg by mouth daily at bedtime   rOPINIRole (REQUIP) 1 mg tablet Take 1 mg by mouth daily   traMADol (ULTRAM) 50 mg tablet Take 50 mg by mouth daily        triamcinolone (KENALOG) 0 1 % cream Apply topically 2 (two) times a day        VIIBRYD 40 MG tablet Take 40 mg by mouth daily with breakfast        LORazepam (ATIVAN) 1 mg tablet Take 1 mg by mouth 2 (two) times a day        LORazepam (ATIVAN) 2 mg tablet TK 1/2 TABLET EVERY 6 HOURS AS NEEDED FOR ANXIETY  1    nitroglycerin (NITROSTAT) 0 4 mg SL tablet Place 1 tablet (0 4 mg total) under the tongue every 5 (five) minutes as needed for chest pain (Patient not taking: Reported on 3/24/2020) 25 tablet 3     No current facility-administered medications for this visit           No Known Allergies    Social History     Socioeconomic History    Marital status: /Civil Union     Spouse name: Not on file    Number of children: Not on file    Years of education: Not on file    Highest education level: Not on file   Occupational History    Not on file   Social Needs    Financial resource strain: Not on file    Food insecurity:     Worry: Not on file     Inability: Not on file    Transportation needs:     Medical: Not on file     Non-medical: Not on file   Tobacco Use    Smoking status: Former Smoker     Last attempt to quit: 2008     Years since quittin 3    Smokeless tobacco: Never Used   Substance and Sexual Activity    Alcohol use: No    Drug use: No    Sexual activity: Not on file   Lifestyle    Physical activity:     Days per week: Not on file     Minutes per session: Not on file    Stress: Not on file   Relationships    Social connections:     Talks on phone: Not on file     Gets together: Not on file     Attends Quaker service: Not on file     Active member of club or organization: Not on file     Attends meetings of clubs or organizations: Not on file     Relationship status: Not on file    Intimate partner violence:     Fear of current or ex partner: Not on file     Emotionally abused: Not on file     Physically abused: Not on file     Forced sexual activity: Not on file   Other Topics Concern    Not on file   Social History Narrative    Not on file        Family History   Problem Relation Age of Onset    Heart attack Father     Heart disease Brother        Lab Results   Component Value Date    WBC 7 37 10/29/2018    HGB 15 2 10/29/2018    HCT 43 9 10/29/2018    MCV 89 10/29/2018     10/29/2018      Lab Results Component Value Date    SODIUM 139 10/29/2018    K 4 4 10/29/2018     10/29/2018    CO2 30 10/29/2018    BUN 14 10/29/2018    CREATININE 0 89 10/29/2018    GLUC 109 10/29/2018    CALCIUM 9 3 10/29/2018      Lab Results   Component Value Date    HGBA1C 5 4 01/30/2016      Lab Results   Component Value Date    CHOL 96 03/17/2015    CHOL 149 09/14/2013     Lab Results   Component Value Date    HDL 28 (L) 01/30/2016    HDL 31 03/17/2015    HDL 29 09/14/2013     Lab Results   Component Value Date    LDLCALC 52 01/30/2016    LDLCALC 41 03/17/2015    [facility] 95 09/14/2013     Lab Results   Component Value Date    TRIG 146 01/30/2016    TRIG 120 03/17/2015    TRIG 124 09/14/2013     No results found for: CHOLHDL   Lab Results   Component Value Date    INR 1 12 10/29/2018    INR 1 11 06/08/2017    PROTIME 14 5 (H) 10/29/2018    PROTIME 14 3 06/08/2017          Patient Active Problem List    Diagnosis Date Noted    Adenopathy 11/15/2018    Mixed hyperlipidemia 02/20/2018    Chest pain 01/29/2016    Hypertension     Old myocardial infarct     Depressed     CAD (coronary artery disease)     Anxiety        Portions of the record may have been created with voice recognition software  Occasional wrong word or "sound a like" substitutions may have occurred due to the inherent limitations of voice recognition software  Read the chart carefully and recognize, using context, where substitutions have occurred

## 2020-05-26 DIAGNOSIS — I25.10 CORONARY ARTERY DISEASE INVOLVING NATIVE HEART, ANGINA PRESENCE UNSPECIFIED, UNSPECIFIED VESSEL OR LESION TYPE: ICD-10-CM

## 2020-05-26 RX ORDER — NITROGLYCERIN 0.4 MG/1
TABLET SUBLINGUAL
Qty: 25 TABLET | Refills: 3 | Status: SHIPPED | OUTPATIENT
Start: 2020-05-26 | End: 2021-10-19

## 2020-09-29 ENCOUNTER — OFFICE VISIT (OUTPATIENT)
Dept: CARDIOLOGY CLINIC | Facility: CLINIC | Age: 55
End: 2020-09-29
Payer: COMMERCIAL

## 2020-09-29 VITALS
SYSTOLIC BLOOD PRESSURE: 115 MMHG | HEIGHT: 70 IN | BODY MASS INDEX: 23.48 KG/M2 | DIASTOLIC BLOOD PRESSURE: 72 MMHG | HEART RATE: 68 BPM | WEIGHT: 164 LBS | TEMPERATURE: 97.7 F

## 2020-09-29 DIAGNOSIS — I25.2 OLD MYOCARDIAL INFARCT: ICD-10-CM

## 2020-09-29 DIAGNOSIS — I25.10 CORONARY ARTERY DISEASE INVOLVING NATIVE CORONARY ARTERY OF NATIVE HEART WITHOUT ANGINA PECTORIS: Primary | ICD-10-CM

## 2020-09-29 DIAGNOSIS — I10 ESSENTIAL HYPERTENSION: ICD-10-CM

## 2020-09-29 DIAGNOSIS — E78.2 MIXED HYPERLIPIDEMIA: ICD-10-CM

## 2020-09-29 PROCEDURE — 93000 ELECTROCARDIOGRAM COMPLETE: CPT | Performed by: INTERNAL MEDICINE

## 2020-09-29 PROCEDURE — 99213 OFFICE O/P EST LOW 20 MIN: CPT | Performed by: INTERNAL MEDICINE

## 2020-09-29 RX ORDER — EZETIMIBE 10 MG/1
10 TABLET ORAL DAILY
Qty: 90 TABLET | Refills: 3 | Status: SHIPPED | OUTPATIENT
Start: 2020-09-29 | End: 2021-09-15

## 2020-09-29 RX ORDER — VERAPAMIL HYDROCHLORIDE 80 MG/1
80 TABLET ORAL EVERY 12 HOURS
Qty: 180 TABLET | Refills: 1
Start: 2020-09-29

## 2020-09-29 RX ORDER — ATORVASTATIN CALCIUM 40 MG/1
20 TABLET, FILM COATED ORAL DAILY
Status: ON HOLD
Start: 2020-09-29 | End: 2022-01-16 | Stop reason: SDUPTHER

## 2020-09-29 NOTE — PROGRESS NOTES
Cardiology Follow Up    [facility], [facility] [place]  1965  [id number]  [address] [facility] [address] [phone]    Reason for visit:  Six-month follow-up for CAD status post anterior wall myocardial infarction with stenting left anterior descending artery in 2009  Mild ischemic cardiomyopathy with a pickle wall motion abnormality and ejection fraction 46% in 2019, hypertension and hyperlipidemia  1  Coronary artery disease involving native coronary artery of native heart without angina pectoris  POCT ECG   2  Essential hypertension     3  Mixed hyperlipidemia     4  Old myocardial infarct         Interval History:  The patient denies chest pain  He does get some dyspnea at times  He denies peripheral edema  He did have some dizziness with change of medication but it is back to baseline  He denies peripheral edema  He is no longer on hydrochlorothiazide but continues to take lisinopril at 20 mg daily  He is now on verapamil at 80 mg b i d  Which helped his headaches        Patient Active Problem List   Diagnosis    Hypertension    Old myocardial infarct    Depressed    CAD (coronary artery disease)    Anxiety    Chest pain    Mixed hyperlipidemia    Adenopathy     Past Medical History:   Diagnosis Date    Angina pectoris (HCC)     Anxiety     Arthritis     Back pain     CAD (coronary artery disease)     DDD (degenerative disc disease), lumbosacral     Depressed     Full dentures     GERD (gastroesophageal reflux disease)     Hyperlipidemia     Hypertension     MI [facility]my[facility] infarction) ([facility] )     2009    Wears glasses      Social History     Socioeconomic History    Marital status: /Civil Union     Spouse name: Not on file    Number of children: Not on file    Years of education: Not on file    Highest education level: Not on file   Occupational History    Not on file   Social Needs    Financial resource strain: Not on file    Food insecurity     Worry: Not on file     Inability: Not on file    Transportation needs     Medical: Not on file     Non-medical: Not on file   Tobacco Use    Smoking status: Former Smoker     Last attempt to quit: 2008     Years since quittin 8    Smokeless tobacco: Never Used   Substance and Sexual Activity    Alcohol use: No    Drug use: No    Sexual activity: Not on file   Lifestyle    Physical activity     Days per week: Not on file     Minutes per session: Not on file    Stress: Not on file   Relationships    Social connections     Talks on phone: Not on file     Gets together: Not on file     Attends Episcopalian service: Not on file     Active member of club or organization: Not on file     Attends meetings of clubs or organizations: Not on file     Relationship status: Not on file    Intimate partner violence     Fear of current or ex partner: Not on file     Emotionally abused: Not on file     Physically abused: Not on file     Forced sexual activity: Not on file   Other Topics Concern    Not on file   Social History Narrative    Not on file      Family History   Problem Relation Age of Onset    Heart attack Father     Heart disease Brother      Past Surgical History:   Procedure Laterality Date    ANGIOPLASTY       LAD    CARDIAC CATHETERIZATION      CO REMOVE ARMPIT LYMPH NODES [address] Right 2018    Procedure: EXCISION BIOPSY LYMPH NODE AXILLARY RIGHT;  Surgeon: Mare Brittle, MD;  Location: [facility];  Service: General       Current Outpatient Medications:     acetaminophen (TYLENOL) 500 mg tablet, Take 500 mg by mouth every 12 (twelve) hours as needed for mild pain, Disp: , Rfl:     aspirin 81 MG tablet, Take 81 mg by mouth daily  , Disp: , Rfl:     atorvastatin (LIPITOR) 40 mg tablet, Take 40 mg by mouth , Disp: , Rfl:     clonazePAM (KlonoPIN) 1 mg tablet, TK 1/2 T PO BID PRN, Disp: , Rfl: 1   gabapentin (NEURONTIN) 600 MG tablet, Take 600 mg by mouth 3 (three) times a day , Disp: , Rfl:     lisinopril (ZESTRIL) 40 mg tablet, Take 0 5 tablets (20 mg total) by mouth 2 (two) times a day, Disp: , Rfl: 0    methocarbamol (ROBAXIN) 750 mg tablet, Take 750 mg by mouth 3 (three) times a day , Disp: , Rfl:     mirtazapine (REMERON) 45 MG tablet, Take 45 mg by mouth daily at bedtime  , Disp: , Rfl:     nitroglycerin (NITROSTAT) 0 4 mg SL tablet, PLACE 1 TABLET UNDER THE TONGUE EVERY 5 MINUTES AS NEEDED CHEST PAIN, Disp: 25 tablet, Rfl: 3    omeprazole (PriLOSEC) 20 mg delayed release capsule, Take 20 mg by mouth 2 (two) times a day , Disp: , Rfl:     QUEtiapine (SEROquel) 100 mg tablet, Take 100 mg by mouth daily at bedtime  , Disp: , Rfl:     rOPINIRole (REQUIP) 1 mg tablet, Take 1 mg by mouth daily  , Disp: , Rfl:     traMADol (ULTRAM) 50 mg tablet, Take 50 mg by mouth daily  , Disp: , Rfl:     triamcinolone (KENALOG) 0 1 % cream, Apply topically 2 (two) times a day , Disp: , Rfl:     VIIBRYD 40 MG tablet, Take 40 mg by mouth daily with breakfast  , Disp: , Rfl:     ezetimibe (ZETIA) 10 mg tablet, , Disp: , Rfl:     HYDROCHLOROTHIAZIDE PO, Take 12 5 mg by mouth, Disp: , Rfl:     HYDROcodone-acetaminophen (NORCO) 5-325 mg per tablet, Take 1 tablet by mouth every 4 (four) hours as needed for pain for up to 10 doses Max Daily Amount: 6 tablets (Patient not taking: Reported on 9/29/2020), Disp: 10 tablet, Rfl: 0    ibuprofen (MOTRIN) 600 mg tablet, Take 1 tablet (600 mg total) by mouth every 8 (eight) hours as needed for mild pain for up to 10 days, Disp: 30 tablet, Rfl: 0    LORazepam (ATIVAN) 1 mg tablet, Take 1 mg by mouth 2 (two) times a day  , Disp: , Rfl:     LORazepam (ATIVAN) 2 mg tablet, TK 1/2 TABLET EVERY 6 HOURS AS NEEDED FOR ANXIETY, Disp: , Rfl: 1  No Known Allergies    Review of Systems:  Review of Systems   Constitutional: Positive for fatigue  Negative for unexpected weight change  Respiratory: Positive for shortness of breath  Negative for cough and wheezing  Cardiovascular: Negative for chest pain, palpitations and leg swelling  Gastrointestinal: Negative for abdominal pain, blood in stool, constipation and diarrhea  Genitourinary: Negative for frequency and hematuria  Musculoskeletal: Positive for back pain  Neurological: Positive for dizziness and headaches  Physical Exam:  Vitals:    09/29/20 1512   BP: 115/72   Pulse: 68   Temp: 97 7 °F (36 5 °C)   Weight: 74 4 kg (164 lb)   Height: 5' 10" (1 778 m)       Physical Exam  Constitutional:       General: He is not in acute distress  HENT:      Head: Normocephalic and atraumatic  Mouth/Throat:      Mouth: Mucous membranes are dry  Pharynx: No posterior oropharyngeal erythema  Eyes:      General: No scleral icterus  Conjunctiva/sclera: Conjunctivae normal    Neck:      Musculoskeletal: Neck supple  Thyroid: No thyroid mass or thyromegaly  Vascular: Normal carotid pulses  No carotid bruit or JVD  Cardiovascular:      Rate and Rhythm: Normal rate and regular rhythm  Pulses: Normal pulses  Heart sounds: No murmur  No friction rub  No gallop  Pulmonary:      Breath sounds: No wheezing, rhonchi or rales  Abdominal:      Palpations: Abdomen is soft  There is no hepatomegaly, splenomegaly or mass  Tenderness: There is no abdominal tenderness  Musculoskeletal:         General: No swelling  Neurological:      Mental Status: He is alert  Discussion/Summary:      1  CAD status post remote stenting at the time of anterior wall myocardial infarction  Having no angina  Had no ischemia on stress testing in 2017  Continue aspirin  No beta-blocker secondary to underlying depression  2  Hypertension  Well controlled on verapamil which was recently added for cluster headaches and lisinopril  Continue same  3  Mixed hyperlipidemia    LDL cholesterol was a bit high at 85 with HDL cholesterol 25 and triglycerides 273  In light of concurrent verapamil therapy of reduced atorvastatin at 20 mg daily and told him to resume ezetimibe 10 mg daily  4  Old myocardial infarction  Mildly reduced ejection fraction  On ACE-inhibitor    No beta-blockers for reasons given    Follow-up 6 months    Chyna Olguin MD

## 2021-04-06 ENCOUNTER — OFFICE VISIT (OUTPATIENT)
Dept: CARDIOLOGY CLINIC | Facility: CLINIC | Age: 56
End: 2021-04-06
Payer: COMMERCIAL

## 2021-04-06 VITALS
HEART RATE: 88 BPM | WEIGHT: 155 LBS | DIASTOLIC BLOOD PRESSURE: 70 MMHG | BODY MASS INDEX: 22.24 KG/M2 | SYSTOLIC BLOOD PRESSURE: 130 MMHG

## 2021-04-06 DIAGNOSIS — I25.10 CORONARY ARTERY DISEASE INVOLVING NATIVE CORONARY ARTERY OF NATIVE HEART WITHOUT ANGINA PECTORIS: Primary | ICD-10-CM

## 2021-04-06 DIAGNOSIS — I25.2 OLD MYOCARDIAL INFARCT: ICD-10-CM

## 2021-04-06 DIAGNOSIS — E78.2 MIXED HYPERLIPIDEMIA: ICD-10-CM

## 2021-04-06 DIAGNOSIS — I10 ESSENTIAL HYPERTENSION: ICD-10-CM

## 2021-04-06 PROCEDURE — 99213 OFFICE O/P EST LOW 20 MIN: CPT | Performed by: INTERNAL MEDICINE

## 2021-04-06 NOTE — PROGRESS NOTES
Cardiology Follow Up    [facility], [facility] [place]  1965  [id number]  [address] [facility] [id number] [phone]    Reason for visit:  Six-month follow-up for CAD status post anterior wall myocardial infarction with stenting left anterior descending artery in 2009  Mild ischemic cardiomyopathy with a pickle wall motion abnormality and ejection fraction 46% in 2019, hypertension and hyperlipidemia  1  Coronary artery disease involving native coronary artery of native heart without angina pectoris     2  Essential hypertension     3  Old myocardial infarct     4  Mixed hyperlipidemia         Interval History:   SINCE HIS LAST VISIT HE HAS HAD SOME ATYPICAL CHEST PAIN WHICH SOUNDS NON ANGINAL  CAN GET SOME DYSPNEA AT TIMES  HE DENIES EDEMA  HE DENIES LIGHTHEADEDNESS OR PALPITATIONS      Patient Active Problem List   Diagnosis    Hypertension    Old myocardial infarct    Depressed    CAD (coronary artery disease)    Anxiety    Chest pain    Mixed hyperlipidemia    Adenopathy     Past Medical History:   Diagnosis Date    Angina pectoris (HCC)     Anxiety     Arthritis     Back pain     CAD (coronary artery disease)     DDD (degenerative disc disease), lumbosacral     Depressed     Full dentures     GERD (gastroesophageal reflux disease)     Hyperlipidemia     Hypertension     [facility]     [facility]    Wears glasses      Social History     Socioeconomic History    Marital status: /Civil Union     Spouse name: Not on file    Number of children: Not on file    Years of education: Not on file    Highest education level: Not on file   Occupational History    Not on file   Social Needs    Financial resource strain: Not on file    Food insecurity     Worry: Not on file     Inability: Not on file    Transportation needs     Medical: Not on file     Non-medical: Not on file Tobacco Use    Smoking status: Former Smoker     Quit date: 2008     Years since quittin 3    Smokeless tobacco: Never Used   Substance and Sexual Activity    Alcohol use: No    Drug use: No    Sexual activity: Not on file   Lifestyle    Physical activity     Days per week: Not on file     Minutes per session: Not on file    Stress: Not on file   Relationships    Social connections     Talks on phone: Not on file     Gets together: Not on file     Attends Alevism service: Not on file     Active member of club or organization: Not on file     Attends meetings of clubs or organizations: Not on file     Relationship status: Not on file    Intimate partner violence     Fear of current or ex partner: Not on file     Emotionally abused: Not on file     Physically abused: Not on file     Forced sexual activity: Not on file   Other Topics Concern    Not on file   Social History Narrative    Not on file      Family History   Problem Relation Age of Onset    Heart attack Father     Heart disease Brother      Past Surgical History:   Procedure Laterality Date    ANGIOPLASTY       LAD    CARDIAC CATHETERIZATION      CO REMOVE ARMPIT LYMPH NODES [address] Right 2018    Procedure: EXCISION BIOPSY LYMPH NODE AXILLARY RIGHT;  Surgeon: Nicol Clark MD;  Location: [facility] OR;  Service: General       Current Outpatient Medications:     acetaminophen (TYLENOL) 500 mg tablet, Take 500 mg by mouth every 12 (twelve) hours as needed for mild pain, Disp: , Rfl:     aspirin 81 MG tablet, Take 81 mg by mouth daily  , Disp: , Rfl:     atorvastatin (LIPITOR) 40 mg tablet, Take 0 5 tablets (20 mg total) by mouth daily, Disp: , Rfl:     clonazePAM (KlonoPIN) 1 mg tablet, TK 1/2 T PO BID PRN, Disp: , Rfl: 1    ezetimibe (ZETIA) 10 mg tablet, Take 1 tablet (10 mg total) by mouth daily, Disp: 90 tablet, Rfl: 3    gabapentin (NEURONTIN) 600 MG tablet, Take 600 mg by mouth 3 (three) times a day , Disp: , Rfl:     HYDROCHLOROTHIAZIDE PO, Take 12 5 mg by mouth, Disp: , Rfl:     ibuprofen (MOTRIN) 600 mg tablet, Take 1 tablet (600 mg total) by mouth every 8 (eight) hours as needed for mild pain for up to 10 days, Disp: 30 tablet, Rfl: 0    lisinopril (ZESTRIL) 40 mg tablet, Take 0 5 tablets (20 mg total) by mouth 2 (two) times a day, Disp: , Rfl: 0    methocarbamol (ROBAXIN) 750 mg tablet, Take 750 mg by mouth 3 (three) times a day , Disp: , Rfl:     mirtazapine (REMERON) 45 MG tablet, Take 45 mg by mouth daily at bedtime  , Disp: , Rfl:     nitroglycerin (NITROSTAT) 0 4 mg SL tablet, PLACE 1 TABLET UNDER THE TONGUE EVERY 5 MINUTES AS NEEDED CHEST PAIN, Disp: 25 tablet, Rfl: 3    omeprazole (PriLOSEC) 20 mg delayed release capsule, Take 20 mg by mouth 2 (two) times a day , Disp: , Rfl:     QUEtiapine (SEROquel) 100 mg tablet, Take 100 mg by mouth daily at bedtime  , Disp: , Rfl:     rOPINIRole (REQUIP) 1 mg tablet, Take 1 mg by mouth daily  , Disp: , Rfl:     traMADol (ULTRAM) 50 mg tablet, Take 50 mg by mouth daily  , Disp: , Rfl:     triamcinolone (KENALOG) 0 1 % cream, Apply topically 2 (two) times a day , Disp: , Rfl:     verapamil (CALAN) 80 mg tablet, Take 1 tablet (80 mg total) by mouth every 12 (twelve) hours, Disp: 180 tablet, Rfl: 1    VIIBRYD 40 MG tablet, Take 40 mg by mouth daily with breakfast  , Disp: , Rfl:     HYDROcodone-acetaminophen (NORCO) 5-325 mg per tablet, Take 1 tablet by mouth every 4 (four) hours as needed for pain for up to 10 doses Max Daily Amount: 6 tablets (Patient not taking: Reported on 9/29/2020), Disp: 10 tablet, Rfl: 0    LORazepam (ATIVAN) 1 mg tablet, Take 1 mg by mouth 2 (two) times a day  , Disp: , Rfl:     LORazepam (ATIVAN) 2 mg tablet, TK 1/2 TABLET EVERY 6 HOURS AS NEEDED FOR ANXIETY, Disp: , Rfl: 1  No Known Allergies    Review of Systems:  Review of Systems   Constitutional: Positive for fatigue and unexpected weight change   Negative for activity change and appetite change  Respiratory: Positive for shortness of breath  Negative for cough, chest tightness and wheezing  Cardiovascular: Positive for chest pain  Negative for palpitations and leg swelling  Gastrointestinal: Negative for blood in stool, constipation and diarrhea  Genitourinary: Negative for frequency and hematuria  Musculoskeletal: Positive for back pain  Negative for arthralgias  Neurological: Positive for headaches  Negative for dizziness and light-headedness  Physical Exam:  Vitals:    04/06/21 1518   BP: 130/70   BP Location: Left arm   Patient Position: Sitting   Cuff Size: Adult   Pulse: 88   Weight: 70 3 kg (155 lb)       Physical Exam  Constitutional:       General: He is not in acute distress  Appearance: He is not ill-appearing  HENT:      Head: Normocephalic and atraumatic  Mouth/Throat:      Mouth: Mucous membranes are moist       Pharynx: No oropharyngeal exudate or posterior oropharyngeal erythema  Eyes:      General: No scleral icterus  Conjunctiva/sclera: Conjunctivae normal    Neck:      Musculoskeletal: Neck supple  Thyroid: No thyroid mass or thyromegaly  Vascular: Normal carotid pulses  No carotid bruit or JVD  Cardiovascular:      Rate and Rhythm: Normal rate and regular rhythm  Pulses: Normal pulses  Heart sounds: No murmur  No friction rub  No gallop  Pulmonary:      Effort: No respiratory distress  Breath sounds: Normal breath sounds  No stridor  No wheezing  Abdominal:      Palpations: Abdomen is soft  There is no hepatomegaly, splenomegaly or mass  Tenderness: There is no abdominal tenderness  Musculoskeletal:         General: No swelling  Neurological:      Mental Status: He is alert  Discussion/Summary:  1  CAD status post myocardial infarction in 2009 with stenting of the left anterior descending artery  Negative stress test 2 years ago    The report some very atypical chest pain dyspnea which I reassured him was not anginal   He will call these increased  I think his occasional chest pain may be musculoskeletal  2  Hypertension  Well controlled on verapamil 80 mg b i d  and lisinopril 20 mg daily  3  Old apical  myocardial infarction  Preserved ejection fraction  4  Mixed hyperlipidemia  Patient on atorvastatin 40 mg daily and ezetimibe 10 mg daily  Somewhat suboptimal lipids when last checked    Will be seen family doctor for updated blood work      FU 6 month    Stress test one year      Ivette Lee MD

## 2021-10-05 ENCOUNTER — OFFICE VISIT (OUTPATIENT)
Dept: CARDIOLOGY CLINIC | Facility: CLINIC | Age: 56
End: 2021-10-05
Payer: COMMERCIAL

## 2021-10-05 VITALS
WEIGHT: 156.2 LBS | DIASTOLIC BLOOD PRESSURE: 80 MMHG | BODY MASS INDEX: 22.41 KG/M2 | HEART RATE: 64 BPM | SYSTOLIC BLOOD PRESSURE: 120 MMHG

## 2021-10-05 DIAGNOSIS — E78.2 MIXED HYPERLIPIDEMIA: ICD-10-CM

## 2021-10-05 DIAGNOSIS — I25.10 CORONARY ARTERY DISEASE INVOLVING NATIVE CORONARY ARTERY OF NATIVE HEART WITHOUT ANGINA PECTORIS: Primary | ICD-10-CM

## 2021-10-05 DIAGNOSIS — I10 PRIMARY HYPERTENSION: ICD-10-CM

## 2021-10-05 DIAGNOSIS — I25.2 OLD MYOCARDIAL INFARCT: ICD-10-CM

## 2021-10-05 PROCEDURE — 99214 OFFICE O/P EST MOD 30 MIN: CPT | Performed by: INTERNAL MEDICINE

## 2021-10-05 PROCEDURE — 93000 ELECTROCARDIOGRAM COMPLETE: CPT | Performed by: INTERNAL MEDICINE

## 2021-10-05 RX ORDER — LISINOPRIL 40 MG/1
20 TABLET ORAL DAILY
Refills: 0
Start: 2021-10-05 | End: 2022-01-16 | Stop reason: HOSPADM

## 2021-12-23 ENCOUNTER — HOSPITAL ENCOUNTER (INPATIENT)
Facility: HOSPITAL | Age: 56
LOS: 4 days | Discharge: LEFT BEFORE DISCHARGE | DRG: 246 | End: 2021-12-28
Attending: EMERGENCY MEDICINE | Admitting: INTERNAL MEDICINE
Payer: COMMERCIAL

## 2021-12-23 DIAGNOSIS — R07.9 CHEST PAIN WITH HIGH RISK FOR CARDIAC ETIOLOGY: ICD-10-CM

## 2021-12-23 DIAGNOSIS — I25.119 CORONARY ARTERY DISEASE INVOLVING NATIVE CORONARY ARTERY OF NATIVE HEART WITH ANGINA PECTORIS (HCC): Chronic | ICD-10-CM

## 2021-12-23 DIAGNOSIS — I21.4 NSTEMI (NON-ST ELEVATED MYOCARDIAL INFARCTION) (HCC): ICD-10-CM

## 2021-12-23 DIAGNOSIS — R07.9 CHEST PAIN: Primary | ICD-10-CM

## 2021-12-23 DIAGNOSIS — I25.2 OLD MYOCARDIAL INFARCT: ICD-10-CM

## 2021-12-23 PROCEDURE — 80048 BASIC METABOLIC PNL TOTAL CA: CPT | Performed by: EMERGENCY MEDICINE

## 2021-12-23 PROCEDURE — 99285 EMERGENCY DEPT VISIT HI MDM: CPT

## 2021-12-23 PROCEDURE — 84484 ASSAY OF TROPONIN QUANT: CPT | Performed by: EMERGENCY MEDICINE

## 2021-12-23 PROCEDURE — 93005 ELECTROCARDIOGRAM TRACING: CPT

## 2021-12-23 PROCEDURE — 36415 COLL VENOUS BLD VENIPUNCTURE: CPT | Performed by: EMERGENCY MEDICINE

## 2021-12-23 PROCEDURE — 85025 COMPLETE CBC W/AUTO DIFF WBC: CPT | Performed by: EMERGENCY MEDICINE

## 2021-12-23 NOTE — Clinical Note
Prepped: right groin and right radial  Prepped with: ChloraPrep  The site was clipped  The patient was draped

## 2021-12-24 ENCOUNTER — APPOINTMENT (EMERGENCY)
Dept: RADIOLOGY | Facility: HOSPITAL | Age: 56
DRG: 246 | End: 2021-12-24
Payer: COMMERCIAL

## 2021-12-24 PROBLEM — G89.4 CHRONIC PAIN SYNDROME: Chronic | Status: ACTIVE | Noted: 2021-12-24

## 2021-12-24 PROBLEM — G44.019 EPISODIC CLUSTER HEADACHE, NOT INTRACTABLE: Chronic | Status: ACTIVE | Noted: 2021-12-24

## 2021-12-24 LAB
2HR DELTA HS TROPONIN: 23 NG/L
4HR DELTA HS TROPONIN: 244 NG/L
ANION GAP SERPL CALCULATED.3IONS-SCNC: 3 MMOL/L (ref 4–13)
ANION GAP SERPL CALCULATED.3IONS-SCNC: 5 MMOL/L (ref 4–13)
APTT PPP: 128 SECONDS (ref 23–37)
ATRIAL RATE: 57 BPM
ATRIAL RATE: 60 BPM
ATRIAL RATE: 68 BPM
ATRIAL RATE: 70 BPM
BASOPHILS # BLD AUTO: 0.06 THOUSANDS/ΜL (ref 0–0.1)
BASOPHILS NFR BLD AUTO: 1 % (ref 0–1)
BUN SERPL-MCNC: 12 MG/DL (ref 5–25)
BUN SERPL-MCNC: 15 MG/DL (ref 5–25)
CALCIUM SERPL-MCNC: 8.6 MG/DL (ref 8.3–10.1)
CALCIUM SERPL-MCNC: 8.9 MG/DL (ref 8.3–10.1)
CARDIAC TROPONIN I PNL SERPL HS: 250 NG/L
CARDIAC TROPONIN I PNL SERPL HS: 29 NG/L
CARDIAC TROPONIN I PNL SERPL HS: 6 NG/L
CHLORIDE SERPL-SCNC: 105 MMOL/L (ref 100–108)
CHLORIDE SERPL-SCNC: 106 MMOL/L (ref 100–108)
CO2 SERPL-SCNC: 27 MMOL/L (ref 21–32)
CO2 SERPL-SCNC: 31 MMOL/L (ref 21–32)
CREAT SERPL-MCNC: 0.67 MG/DL (ref 0.6–1.3)
CREAT SERPL-MCNC: 0.89 MG/DL (ref 0.6–1.3)
EOSINOPHIL # BLD AUTO: 0.2 THOUSAND/ΜL (ref 0–0.61)
EOSINOPHIL NFR BLD AUTO: 3 % (ref 0–6)
ERYTHROCYTE [DISTWIDTH] IN BLOOD BY AUTOMATED COUNT: 12.1 % (ref 11.6–15.1)
ERYTHROCYTE [DISTWIDTH] IN BLOOD BY AUTOMATED COUNT: 12.2 % (ref 11.6–15.1)
GFR SERPL CREATININE-BSD FRML MDRD: 105 ML/MIN/1.73SQ M
GFR SERPL CREATININE-BSD FRML MDRD: 94 ML/MIN/1.73SQ M
GLUCOSE SERPL-MCNC: 100 MG/DL (ref 65–140)
GLUCOSE SERPL-MCNC: 108 MG/DL (ref 65–140)
HCT VFR BLD AUTO: 41 % (ref 36.5–49.3)
HCT VFR BLD AUTO: 42.8 % (ref 36.5–49.3)
HGB BLD-MCNC: 14.3 G/DL (ref 12–17)
HGB BLD-MCNC: 14.7 G/DL (ref 12–17)
IMM GRANULOCYTES # BLD AUTO: 0.04 THOUSAND/UL (ref 0–0.2)
IMM GRANULOCYTES NFR BLD AUTO: 1 % (ref 0–2)
INR PPP: 1.14 (ref 0.84–1.19)
LYMPHOCYTES # BLD AUTO: 2.62 THOUSANDS/ΜL (ref 0.6–4.47)
LYMPHOCYTES NFR BLD AUTO: 34 % (ref 14–44)
MCH RBC QN AUTO: 30.5 PG (ref 26.8–34.3)
MCH RBC QN AUTO: 31.8 PG (ref 26.8–34.3)
MCHC RBC AUTO-ENTMCNC: 34.3 G/DL (ref 31.4–37.4)
MCHC RBC AUTO-ENTMCNC: 34.9 G/DL (ref 31.4–37.4)
MCV RBC AUTO: 89 FL (ref 82–98)
MCV RBC AUTO: 91 FL (ref 82–98)
MONOCYTES # BLD AUTO: 0.52 THOUSAND/ΜL (ref 0.17–1.22)
MONOCYTES NFR BLD AUTO: 7 % (ref 4–12)
NEUTROPHILS # BLD AUTO: 4.2 THOUSANDS/ΜL (ref 1.85–7.62)
NEUTS SEG NFR BLD AUTO: 54 % (ref 43–75)
NRBC BLD AUTO-RTO: 0 /100 WBCS
NRBC BLD AUTO-RTO: 0 /100 WBCS
P AXIS: 72 DEGREES
P AXIS: 78 DEGREES
P AXIS: 82 DEGREES
P AXIS: 82 DEGREES
PLATELET # BLD AUTO: 165 THOUSANDS/UL (ref 149–390)
PLATELET # BLD AUTO: 183 THOUSANDS/UL (ref 149–390)
PMV BLD AUTO: 10.8 FL (ref 8.9–12.7)
PMV BLD AUTO: 11.6 FL (ref 8.9–12.7)
POTASSIUM SERPL-SCNC: 4.4 MMOL/L (ref 3.5–5.3)
POTASSIUM SERPL-SCNC: 5 MMOL/L (ref 3.5–5.3)
PR INTERVAL: 174 MS
PR INTERVAL: 174 MS
PR INTERVAL: 188 MS
PR INTERVAL: 200 MS
PROTHROMBIN TIME: 14.4 SECONDS (ref 11.6–14.5)
QRS AXIS: -41 DEGREES
QRS AXIS: -54 DEGREES
QRS AXIS: -59 DEGREES
QRS AXIS: -68 DEGREES
QRSD INTERVAL: 80 MS
QRSD INTERVAL: 92 MS
QT INTERVAL: 392 MS
QT INTERVAL: 412 MS
QT INTERVAL: 442 MS
QT INTERVAL: 468 MS
QTC INTERVAL: 416 MS
QTC INTERVAL: 430 MS
QTC INTERVAL: 444 MS
QTC INTERVAL: 468 MS
RBC # BLD AUTO: 4.5 MILLION/UL (ref 3.88–5.62)
RBC # BLD AUTO: 4.82 MILLION/UL (ref 3.88–5.62)
SODIUM SERPL-SCNC: 138 MMOL/L (ref 136–145)
SODIUM SERPL-SCNC: 139 MMOL/L (ref 136–145)
T WAVE AXIS: 128 DEGREES
T WAVE AXIS: 66 DEGREES
T WAVE AXIS: 77 DEGREES
T WAVE AXIS: 88 DEGREES
VENTRICULAR RATE: 57 BPM
VENTRICULAR RATE: 60 BPM
VENTRICULAR RATE: 68 BPM
VENTRICULAR RATE: 70 BPM
WBC # BLD AUTO: 7.64 THOUSAND/UL (ref 4.31–10.16)
WBC # BLD AUTO: 8.12 THOUSAND/UL (ref 4.31–10.16)

## 2021-12-24 PROCEDURE — 93010 ELECTROCARDIOGRAM REPORT: CPT | Performed by: INTERNAL MEDICINE

## 2021-12-24 PROCEDURE — 99226 PR SBSQ OBSERVATION CARE/DAY 35 MINUTES: CPT | Performed by: INTERNAL MEDICINE

## 2021-12-24 PROCEDURE — 99285 EMERGENCY DEPT VISIT HI MDM: CPT | Performed by: EMERGENCY MEDICINE

## 2021-12-24 PROCEDURE — 85025 COMPLETE CBC W/AUTO DIFF WBC: CPT | Performed by: FAMILY MEDICINE

## 2021-12-24 PROCEDURE — 84484 ASSAY OF TROPONIN QUANT: CPT | Performed by: EMERGENCY MEDICINE

## 2021-12-24 PROCEDURE — 93005 ELECTROCARDIOGRAM TRACING: CPT

## 2021-12-24 PROCEDURE — 85610 PROTHROMBIN TIME: CPT | Performed by: INTERNAL MEDICINE

## 2021-12-24 PROCEDURE — 36415 COLL VENOUS BLD VENIPUNCTURE: CPT | Performed by: EMERGENCY MEDICINE

## 2021-12-24 PROCEDURE — 85730 THROMBOPLASTIN TIME PARTIAL: CPT | Performed by: INTERNAL MEDICINE

## 2021-12-24 PROCEDURE — 99215 OFFICE O/P EST HI 40 MIN: CPT | Performed by: INTERNAL MEDICINE

## 2021-12-24 PROCEDURE — 80048 BASIC METABOLIC PNL TOTAL CA: CPT | Performed by: FAMILY MEDICINE

## 2021-12-24 PROCEDURE — 71046 X-RAY EXAM CHEST 2 VIEWS: CPT

## 2021-12-24 PROCEDURE — 84484 ASSAY OF TROPONIN QUANT: CPT | Performed by: NURSE PRACTITIONER

## 2021-12-24 PROCEDURE — 99220 PR INITIAL OBSERVATION CARE/DAY 70 MINUTES: CPT | Performed by: NURSE PRACTITIONER

## 2021-12-24 RX ORDER — CLOPIDOGREL BISULFATE 75 MG/1
300 TABLET ORAL ONCE
Status: COMPLETED | OUTPATIENT
Start: 2021-12-24 | End: 2021-12-24

## 2021-12-24 RX ORDER — ONDANSETRON 2 MG/ML
4 INJECTION INTRAMUSCULAR; INTRAVENOUS EVERY 6 HOURS PRN
Status: DISCONTINUED | OUTPATIENT
Start: 2021-12-24 | End: 2021-12-28 | Stop reason: HOSPADM

## 2021-12-24 RX ORDER — PANTOPRAZOLE SODIUM 20 MG/1
20 TABLET, DELAYED RELEASE ORAL
Status: DISCONTINUED | OUTPATIENT
Start: 2021-12-24 | End: 2021-12-28 | Stop reason: HOSPADM

## 2021-12-24 RX ORDER — HEPARIN SODIUM 10000 [USP'U]/100ML
3-20 INJECTION, SOLUTION INTRAVENOUS
Status: DISCONTINUED | OUTPATIENT
Start: 2021-12-24 | End: 2021-12-28 | Stop reason: HOSPADM

## 2021-12-24 RX ORDER — GABAPENTIN 300 MG/1
600 CAPSULE ORAL 3 TIMES DAILY
Status: DISCONTINUED | OUTPATIENT
Start: 2021-12-24 | End: 2021-12-28 | Stop reason: HOSPADM

## 2021-12-24 RX ORDER — OXYCODONE HYDROCHLORIDE 5 MG/1
5 TABLET ORAL EVERY 4 HOURS PRN
Status: DISCONTINUED | OUTPATIENT
Start: 2021-12-24 | End: 2021-12-28 | Stop reason: HOSPADM

## 2021-12-24 RX ORDER — MIRTAZAPINE 15 MG/1
45 TABLET, FILM COATED ORAL
Status: DISCONTINUED | OUTPATIENT
Start: 2021-12-24 | End: 2021-12-28 | Stop reason: HOSPADM

## 2021-12-24 RX ORDER — HEPARIN SODIUM 5000 [USP'U]/ML
5000 INJECTION, SOLUTION INTRAVENOUS; SUBCUTANEOUS EVERY 8 HOURS SCHEDULED
Status: DISCONTINUED | OUTPATIENT
Start: 2021-12-24 | End: 2021-12-24 | Stop reason: SDUPTHER

## 2021-12-24 RX ORDER — CLOPIDOGREL BISULFATE 75 MG/1
75 TABLET ORAL DAILY
Status: DISCONTINUED | OUTPATIENT
Start: 2021-12-25 | End: 2021-12-28 | Stop reason: HOSPADM

## 2021-12-24 RX ORDER — ACETAMINOPHEN 325 MG/1
975 TABLET ORAL EVERY 6 HOURS PRN
Status: DISCONTINUED | OUTPATIENT
Start: 2021-12-24 | End: 2021-12-28 | Stop reason: HOSPADM

## 2021-12-24 RX ORDER — ATORVASTATIN CALCIUM 10 MG/1
20 TABLET, FILM COATED ORAL
Status: DISCONTINUED | OUTPATIENT
Start: 2021-12-24 | End: 2021-12-28 | Stop reason: HOSPADM

## 2021-12-24 RX ORDER — QUETIAPINE FUMARATE 100 MG/1
100 TABLET, FILM COATED ORAL
Status: DISCONTINUED | OUTPATIENT
Start: 2021-12-24 | End: 2021-12-28 | Stop reason: HOSPADM

## 2021-12-24 RX ORDER — HEPARIN SODIUM 1000 [USP'U]/ML
4000 INJECTION, SOLUTION INTRAVENOUS; SUBCUTANEOUS ONCE
Status: COMPLETED | OUTPATIENT
Start: 2021-12-24 | End: 2021-12-24

## 2021-12-24 RX ORDER — EZETIMIBE 10 MG/1
10 TABLET ORAL DAILY
Status: DISCONTINUED | OUTPATIENT
Start: 2021-12-24 | End: 2021-12-28 | Stop reason: HOSPADM

## 2021-12-24 RX ORDER — LISINOPRIL 20 MG/1
20 TABLET ORAL DAILY
Status: DISCONTINUED | OUTPATIENT
Start: 2021-12-24 | End: 2021-12-24

## 2021-12-24 RX ORDER — FAMOTIDINE 20 MG/1
20 TABLET, FILM COATED ORAL ONCE
Status: COMPLETED | OUTPATIENT
Start: 2021-12-24 | End: 2021-12-24

## 2021-12-24 RX ORDER — METHOCARBAMOL 500 MG/1
750 TABLET, FILM COATED ORAL EVERY 6 HOURS PRN
Status: DISCONTINUED | OUTPATIENT
Start: 2021-12-24 | End: 2021-12-28 | Stop reason: HOSPADM

## 2021-12-24 RX ORDER — VILAZODONE HYDROCHLORIDE 40 MG/1
40 TABLET ORAL
Status: DISCONTINUED | OUTPATIENT
Start: 2021-12-24 | End: 2021-12-28 | Stop reason: HOSPADM

## 2021-12-24 RX ORDER — CLONAZEPAM 0.5 MG/1
0.5 TABLET ORAL 2 TIMES DAILY
Status: DISCONTINUED | OUTPATIENT
Start: 2021-12-24 | End: 2021-12-28 | Stop reason: HOSPADM

## 2021-12-24 RX ORDER — ASPIRIN 81 MG/1
81 TABLET ORAL DAILY
Status: DISCONTINUED | OUTPATIENT
Start: 2021-12-24 | End: 2021-12-28 | Stop reason: HOSPADM

## 2021-12-24 RX ORDER — VERAPAMIL HYDROCHLORIDE 80 MG/1
80 TABLET ORAL EVERY 12 HOURS
Status: DISCONTINUED | OUTPATIENT
Start: 2021-12-24 | End: 2021-12-28 | Stop reason: HOSPADM

## 2021-12-24 RX ORDER — LISINOPRIL 10 MG/1
10 TABLET ORAL DAILY
Status: DISCONTINUED | OUTPATIENT
Start: 2021-12-24 | End: 2021-12-28 | Stop reason: HOSPADM

## 2021-12-24 RX ORDER — SIMETHICONE 80 MG
80 TABLET,CHEWABLE ORAL 4 TIMES DAILY PRN
Status: DISCONTINUED | OUTPATIENT
Start: 2021-12-24 | End: 2021-12-28 | Stop reason: HOSPADM

## 2021-12-24 RX ORDER — MAGNESIUM HYDROXIDE/ALUMINUM HYDROXICE/SIMETHICONE 120; 1200; 1200 MG/30ML; MG/30ML; MG/30ML
30 SUSPENSION ORAL EVERY 6 HOURS PRN
Status: DISCONTINUED | OUTPATIENT
Start: 2021-12-24 | End: 2021-12-28 | Stop reason: HOSPADM

## 2021-12-24 RX ORDER — ROPINIROLE 0.25 MG/1
1 TABLET, FILM COATED ORAL
Status: DISCONTINUED | OUTPATIENT
Start: 2021-12-24 | End: 2021-12-28 | Stop reason: HOSPADM

## 2021-12-24 RX ADMIN — CLONAZEPAM 0.5 MG: 0.5 TABLET ORAL at 08:48

## 2021-12-24 RX ADMIN — ROPINIROLE 1 MG: 0.25 TABLET, FILM COATED ORAL at 21:54

## 2021-12-24 RX ADMIN — EZETIMIBE 10 MG: 10 TABLET ORAL at 08:48

## 2021-12-24 RX ADMIN — HEPARIN SODIUM 5000 UNITS: 5000 INJECTION INTRAVENOUS; SUBCUTANEOUS at 08:49

## 2021-12-24 RX ADMIN — GABAPENTIN 600 MG: 300 CAPSULE ORAL at 21:54

## 2021-12-24 RX ADMIN — HEPARIN SODIUM 4000 UNITS: 1000 INJECTION INTRAVENOUS; SUBCUTANEOUS at 11:27

## 2021-12-24 RX ADMIN — ASPIRIN 81 MG: 81 TABLET, COATED ORAL at 08:48

## 2021-12-24 RX ADMIN — CLOPIDOGREL BISULFATE 300 MG: 75 TABLET ORAL at 09:26

## 2021-12-24 RX ADMIN — HEPARIN SODIUM 12 UNITS/KG/HR: 10000 INJECTION, SOLUTION INTRAVENOUS at 11:28

## 2021-12-24 RX ADMIN — VERAPAMIL HYDROCHLORIDE 80 MG: 80 TABLET ORAL at 15:18

## 2021-12-24 RX ADMIN — FAMOTIDINE 20 MG: 20 TABLET, FILM COATED ORAL at 01:28

## 2021-12-24 RX ADMIN — ACETAMINOPHEN 975 MG: 325 TABLET, FILM COATED ORAL at 15:18

## 2021-12-24 RX ADMIN — GABAPENTIN 600 MG: 300 CAPSULE ORAL at 08:48

## 2021-12-24 RX ADMIN — MIRTAZAPINE 45 MG: 15 TABLET, FILM COATED ORAL at 21:54

## 2021-12-24 RX ADMIN — GABAPENTIN 600 MG: 300 CAPSULE ORAL at 15:18

## 2021-12-24 RX ADMIN — LISINOPRIL 10 MG: 10 TABLET ORAL at 08:49

## 2021-12-24 RX ADMIN — VERAPAMIL HYDROCHLORIDE 80 MG: 80 TABLET ORAL at 03:54

## 2021-12-24 RX ADMIN — PANTOPRAZOLE SODIUM 20 MG: 20 TABLET, DELAYED RELEASE ORAL at 05:59

## 2021-12-24 RX ADMIN — ATORVASTATIN CALCIUM 20 MG: 10 TABLET, FILM COATED ORAL at 15:39

## 2021-12-24 RX ADMIN — CLONAZEPAM 0.5 MG: 0.5 TABLET ORAL at 17:35

## 2021-12-24 RX ADMIN — QUETIAPINE FUMARATE 100 MG: 100 TABLET ORAL at 21:53

## 2021-12-24 RX ADMIN — NITROGLYCERIN 1 INCH: 20 OINTMENT TOPICAL at 00:10

## 2021-12-24 RX ADMIN — VILAZODONE HYDROCHLORIDE 40 MG: 40 TABLET ORAL at 08:49

## 2021-12-24 NOTE — H&P
[address] [facility]&Janes Calix 1965, 60 y o  male MRN: [id number]  Unit/Bed#: ED 08 Encounter: [id number]  Primary Care Provider: Oliva Booth DO   Date and time admitted to hospital: 12/23/2021 11:42 PM    Chest pain with high risk for cardiac etiology  Assessment & Plan  · Reports chest pain, took NTG x2 with no improvement  Given ASA bolus by EMS  Chest pain improved in ED after Nitro-Bid applied  · Chronic chest pain; followed outpatient by Dr Kezia Rand, "Since his last visit he has had some atypical chest pain which sounds non anginal "  H/o CAD s/p anterior wall myocardial infarction with stenting left anterior descending artery in 2009  Mild ischemic cardiomyopathy with apical wall motion abnormality, ejection fraction 46% in 2019  · JIMENA Score: 2  · EKG:  Chronic ischemic changes on EKG; normal serum troponin  Cycle troponins and EKG  · Continue daily ASA, statin, ACEI  As per cardiologist, not on BB due to depression  · A1c and lipid panel up to date  · Monitor on telemetry  · Cardiology consult    Coronary artery disease involving native coronary artery of native heart with angina pectoris [facility])  Assessment & Plan  · CAD s/p anterior wall MI with stenting left anterior descending artery in 2009  On low-dose ASA, statin, ACEI and prn NTG  Followed outpatient by Dr Kezia Rand, "Not on beta-blocker in light of history of depression '      Chronic pain syndrome  Assessment & Plan  · Chronic pain treated with gabapentin 600mg TID, PRN Robaxin and Tramadol 50mg daily prn, continue  Tramadol verified on PDMP  Episodic cluster headache, not intractable  Assessment & Plan  · Treated with verapamil    Mixed hyperlipidemia  Assessment & Plan  · Continue statin and zetia    Anxiety with depression  Assessment & Plan  · Continue Vilazodone, mirtazapine, Seroquel and clonazepam     · PDMP reviewed, patient on clonazepam 0 5mg b i d  P r n      Primary hypertension  Assessment & Plan  · Continue lisinopril and verapamil    VTE Prophylaxis: dvt score 1  / reason for no mechanical VTE prophylaxis Ambulatory   Code Status:  Full code  POLST: POLST is not applicable to this patient  Discussion with family:     Anticipated Length of Stay:  Patient will be admitted on an Observation basis with an anticipated length of stay of  < 2 midnights  Justification for Hospital Stay: CP    Total Time for Visit, including Counseling / Coordination of Care: 60 minutes  Greater than 50% of this total time spent on direct patient counseling and coordination of care  Chief Complaint:   Chest pain    History of Present Illness:    Ambrose Duggan is a 60 y o  male with PMH CAD, MI s/p PCI with LAD stent who presents with c/o chest pain  Reports he was seated on the couch when he developed chest pain radiating to his left shoulder with associated shortness of breath, took NTG x2 without relief so he called 911  Denies nausea or diaphoresis, chest pain improved after NTG paste applied in ED; no exacerbating factors identified  Reports history of chronic chest pain although states this pain felt different although not similar to his prior MI  Review of Systems:    Review of Systems   Constitutional: Negative  HENT: Negative  Respiratory: Positive for shortness of breath  Negative for cough and wheezing  Cardiovascular: Positive for chest pain  Negative for palpitations and leg swelling  Gastrointestinal: Negative  Genitourinary: Negative  Musculoskeletal: Negative  Skin: Negative  Neurological: Negative  Psychiatric/Behavioral: Negative          Past Medical and Surgical History:     Past Medical History:   Diagnosis Date    Angina pectoris (HCC)     Anxiety     Arthritis     Back pain     CAD (coronary artery disease)     DDD (degenerative disc disease), lumbosacral     Depressed     Full dentures     GERD (gastroesophageal reflux disease)     Hyperlipidemia     Hypertension     [facility]     2009    Wears glasses        Past Surgical History:   Procedure Laterality Date    ANGIOPLASTY  2009     LAD    CARDIAC CATHETERIZATION      MD REMOVE ARMPIT LYMPH NODES [employer]IC Right 11/21/2018    Procedure: EXCISION BIOPSY LYMPH NODE AXILLARY RIGHT;  Surgeon: Alex Montoya MD;  Location: [facility];  Service: General       Meds/Allergies:    Prior to Admission medications    Medication Sig Start Date End Date Taking? Authorizing Provider   acetaminophen (TYLENOL) 500 mg tablet Take 500 mg by mouth every 12 (twelve) hours as needed for mild pain    Historical Provider, MD   aspirin 81 MG tablet Take 81 mg by mouth daily  Historical Provider, MD   atorvastatin (LIPITOR) 40 mg tablet Take 0 5 tablets (20 mg total) by mouth daily 9/29/20   Lena Garcia MD   clonazePAM (KlonoPIN) 1 mg tablet TK 1/2 T PO BID PRN 8/22/19   Historical Provider, MD   ezetimibe (ZETIA) 10 mg tablet TAKE 1 TABLET(10 MG) BY MOUTH DAILY 9/15/21   Lena Garcia MD   gabapentin (NEURONTIN) 600 MG tablet Take 600 mg by mouth 3 (three) times a day  Historical Provider, MD   ibuprofen (MOTRIN) 600 mg tablet Take 1 tablet (600 mg total) by mouth every 8 (eight) hours as needed for mild pain for up to 10 days  Patient not taking: Reported on 10/5/2021 10/29/18 4/6/21  Chuck Swenson MD   lisinopril (ZESTRIL) 40 mg tablet Take 0 5 tablets (20 mg total) by mouth daily 10/5/21   Lena Garcia MD   methocarbamol (ROBAXIN) 750 mg tablet Take 750 mg by mouth 3 (three) times a day  Historical Provider, MD   mirtazapine (REMERON) 45 MG tablet Take 45 mg by mouth daily at bedtime  Historical Provider, MD   nitroglycerin (NITROSTAT) 0 4 mg SL tablet PLACE 1 TABLET UNDER THE TONGUE EVERY 5 MINS AS NEEDED FOR CHEST PAIN 10/31/21   Lena Garcia MD   omeprazole (PriLOSEC) 20 mg delayed release capsule Take 20 mg by mouth 2 (two) times a day      Historical Provider, MD QUEtiapine (SEROquel) 100 mg tablet Take 100 mg by mouth daily at bedtime  Historical Provider, MD   rOPINIRole (REQUIP) 1 mg tablet Take 1 mg by mouth daily  Historical Provider, MD   traMADol (ULTRAM) 50 mg tablet Take 50 mg by mouth daily      Historical Provider, MD   triamcinolone (KENALOG) 0 1 % cream Apply topically 2 (two) times a day  Historical Provider, MD   verapamil (CALAN) 80 mg tablet Take 1 tablet (80 mg total) by mouth every 12 (twelve) hours 20   Ivette Lee MD   VIIBRYD 40 MG tablet Take 40 mg by mouth daily with breakfast   18   Historical Provider, MD   HYDROcodone-acetaminophen (NORCO) 5-325 mg per tablet Take 1 tablet by mouth every 4 (four) hours as needed for pain for up to 10 doses Max Daily Amount: 6 tablets  Patient not taking: Reported on 18  Argentina Cardoso PA-C   LORazepam (ATIVAN) 1 mg tablet Take 1 mg by mouth 2 (two) times a day    21  Historical Provider, MD   LORazepam (ATIVAN) 2 mg tablet TK 1/2 TABLET EVERY 6 HOURS AS NEEDED FOR ANXIETY 18  Historical Provider, MD     I have reviewed home medications with patient personally      Allergies: No Known Allergies    Social History:     Marital Status: /Civil Union   Occupation: disabled  Patient Pre-hospital Living Situation: resides with spouse  Patient Pre-hospital Level of Mobility:  Ambulatory  Patient Pre-hospital Diet Restrictions:   Substance Use History:   Social History     Substance and Sexual Activity   Alcohol Use No     Social History     Tobacco Use   Smoking Status Former Smoker    Quit date: 2008    Years since quittin 1   Smokeless Tobacco Never Used     Social History     Substance and Sexual Activity   Drug Use No       Family History:    Family History   Problem Relation Age of Onset    Heart attack Father     Heart disease Brother        Physical Exam:     Vitals:   Blood Pressure: 126/67 (21 0130)  Pulse: 60 (12/24/21 0130)  Temperature: 98 5 °F (36 9 °C) (12/24/21 0000)  Temp Source: Oral (12/24/21 0000)  Respirations: 18 (12/24/21 0130)  Height: 5' 10" (177 8 cm) (12/23/21 2349)  Weight - Scale: 74 4 kg (164 lb) (12/23/21 2349)  SpO2: 99 % (12/24/21 0130)    Physical Exam  Constitutional:       General: He is not in acute distress  Appearance: Normal appearance  He is normal weight  He is not ill-appearing, toxic-appearing or diaphoretic  HENT:      Head: Normocephalic and atraumatic  Nose: No congestion or rhinorrhea  Mouth/Throat:      Mouth: Mucous membranes are dry  Eyes:      Conjunctiva/sclera: Conjunctivae normal    Cardiovascular:      Rate and Rhythm: Normal rate and regular rhythm  Heart sounds: Normal heart sounds  Pulmonary:      Effort: Pulmonary effort is normal       Breath sounds: Normal breath sounds  Abdominal:      General: Bowel sounds are normal       Palpations: Abdomen is soft  Musculoskeletal:         General: No tenderness  Skin:     General: Skin is dry  Neurological:      Mental Status: He is alert and oriented to person, place, and time  Psychiatric:         Mood and Affect: Mood normal          Behavior: Behavior normal          Thought Content: Thought content normal          Judgment: Judgment normal      Additional Data:     Lab Results: I have personally reviewed pertinent reports  Results from last 7 days   Lab Units 12/23/21  2354   WBC Thousand/uL 8 12   HEMOGLOBIN g/dL 14 7   HEMATOCRIT % 42 8   PLATELETS Thousands/uL 165     Results from last 7 days   Lab Units 12/23/21  2354   SODIUM mmol/L 138   POTASSIUM mmol/L 5 0   CHLORIDE mmol/L 106   CO2 mmol/L 27   BUN mg/dL 12   CREATININE mg/dL 0 67   ANION GAP mmol/L 5   CALCIUM mg/dL 8 6   GLUCOSE RANDOM mg/dL 108                       Imaging: I have personally reviewed pertinent reports        XR chest 2 views   ED Interpretation by Maci Montemayor DO (12/24 1527)   No acute disease EKG, Pathology, and Other Studies Reviewed on Admission:   · EKG: cxr stress test    Allscripts / Epic Records Reviewed: Yes     ** Please Note: This note has been constructed using a voice recognition system   **

## 2021-12-24 NOTE — UTILIZATION REVIEW
Initial Clinical Review    Admission: Date/Time/Statement: [email or web address] TO [email or web address] D/T NSTEMI AND NEED FOR CARDIAC CATH  Admission Orders (From admission, onward)     Ordered        12/24/21 1218  Inpatient Admission  Once            12/24/21 0134  Place in Observation  Once                                                         Inpatient Admission     Standing Status:   Standing     Number of Occurrences:   1     Order Specific Question:   Level of Care     Answer:   Med Surg [16]     Order Specific Question:   Estimated length of stay     Answer:   More than 2 Midnights     Order Specific Question:   Certification     Answer:   I certify that inpatient services are medically necessary for this patient for a duration of greater than two midnights  See H&P and MD Progress Notes for additional information about the patient's course of treatment  ED Arrival Information     Expected Arrival Acuity    - 12/23/2021 23:42 Emergent         Means of arrival Escorted by Service Admission type    Ambulance [place] ([facility]) Hospitalist Emergency         Arrival complaint    chest pain        Chief Complaint   Patient presents with    Chest Pain     c/o 8/10 left sided chest pain with rad to jaw and neck  took 2 nitro prior to ems call  ems places 20# IV, gave 1 subl nitro and 324 asa  pain down to 6/10       Initial Presentation: 59 yo male to ED by ems admitted observation [email or web address] to [email or web address] d/t chest pain with high risk for cardiac etiology  Presents with chest pain, took NTG x2 with no improvement  Reports he was seated on the couch when he developed chest pain radiating to his left shoulder with associated shortness of breath  Given ASA bolus by EMS  Chest pain improved in ED after Nitro-Bid applied  PMh Chronic chest pain, htn and CAD s/p anterior wall myocardial infarction with stenting left anterior descending artery in 2009   Mild ischemic cardiomyopathy with apical wall motion abnormality, ejection fraction 46% in 2019, followed outpatient  JIMENA Score: 2  EKG:  Chronic ischemic changes on EKG; normal serum troponin  Cycle troponins and EKG  ASA, statin, ACEI  not on BB due to depression  Tele  Cardiology consult  12/24 CARDIOLOGY CONSULT:Non ST-elevation MI  Stent of the left anterior descending artery 2009 for a STEMI  Hyperlipidemia:  Cholesterol 139, triglycerides 213, HDL 27, LDL calculated 69, non HDL cholesterol 112  IV heparin  Plavix  cardiac catheterization Monday sooner if pain reoccurs  adamant about leaving against medical advice but would be willing to come back for cardiac catheterization on Monday 12/24:Cardiologist recommend pt remain in the hospital for cardiac catheterization  Patient is currently refusing and insisting discharge  Agree with Cardiology assessment that patient is not safe for discharge will be at risk for MI  Recommend plavix load, heparin ACS infusion  12/24 update:  Met with pt's brother and wife at bedside  Gave update and concerns regarding heart attack and need to remain in the hospital for catheterization  Pt agreement with plan and iv heparin infusing  Will be hospitalized >2 midnights due to nstemi and need for cath  Change to inpt status      ED Triage Vitals   Temperature Pulse Respirations Blood Pressure SpO2   12/24/21 0000 12/23/21 2349 12/23/21 2349 12/23/21 2349 12/23/21 2349   98 5 °F (36 9 °C) 70 18 116/63 98 %      Temp Source Heart Rate Source Patient Position - Orthostatic VS BP Location FiO2 (%)   12/24/21 0000 12/23/21 2349 12/23/21 2349 12/23/21 2349 --   Oral Monitor Lying Right arm       Pain Score       12/23/21 2349       6          Wt Readings from Last 1 Encounters:   12/23/21 74 4 kg (164 lb)     Additional Vital Signs:   12/24/21 0739 97 6 °F (36 4 °C) 64 18 105/69 74 99 % None (Room air) Lying   12/24/21 0354 -- -- -- 114/70 -- -- -- --   12/24/21 0252 97 9 °F (36 6 °C) 66 18 116/73 90 98 % None (Room air) Lying   12/24/21 0130 -- 60 18 126/67 -- 99 % None (Room air) Lying   12/24/21 0045 -- -- -- 115/67 85 -- -- --   12/24/21 0042 -- 64 21 -- -- -- -- --   12/24/21 0030 -- 60 19 120/68 88 98 % -- --   12/24/21 0015 -- 64 16 110/57 81 98 % None (Room air) Lying   12/24/21 0000 98 5 °F (36 9 °C) -- -- -- -- -- -- --   12/23/21 2349 -- 70 18 116/63 -- 98 % None (Room air) Lying       Pertinent Labs/Diagnostic Test Results:       Results from last 7 days   Lab Units 12/24/21 0413 12/23/21  2354   WBC Thousand/uL 7 64 8 12   HEMOGLOBIN g/dL 14 3 14 7   HEMATOCRIT % 41 0 42 8   PLATELETS Thousands/uL 183 165   NEUTROS ABS Thousands/µL 4 20  --          Results from last 7 days   Lab Units 12/24/21 0413 12/23/21  2354   SODIUM mmol/L 139 138   POTASSIUM mmol/L 4 4 5 0   CHLORIDE mmol/L 105 106   CO2 mmol/L 31 27   ANION GAP mmol/L 3* 5   BUN mg/dL 15 12   CREATININE mg/dL 0 89 0 67   EGFR ml/min/1 73sq m 94 105   CALCIUM mg/dL 8 9 8 6       Results from last 7 days   Lab Units 12/24/21  0413 12/23/21  2354   GLUCOSE RANDOM mg/dL 100 108       Results from last 7 days   Lab Units 12/24/21  0413 12/24/21  0216 12/23/21  2354   HS TNI 0HR ng/L  --   --  6   HS TNI 2HR ng/L  --  29  --    HSTNI D2 ng/L  --  23  --    HS TNI 4HR ng/L 250*  --   --    HSTNI D4 ng/L 244  --   --      12/24 cxr:pending    12/24 ekg:  Normal sinus rhythm  Left axis deviation  Anteroseptal infarct (cited on or before 29-JAN-2016)  T wave abnormality, consider lateral ischemia  Abnormal ECG  When compared with ECG of 24-DEC-2021 02:00, (unconfirmed)  Questionable change in initial forces of Septal leads  Nonspecific T wave abnormality no longer evident in Inferior leads  Confirmed by Joselyn Wong ([id number]) on 12/24/2021 6:17:17 AM    12/24 ekg:  Sinus bradycardia  Left anterior fascicular block  Anteroseptal infarct (cited on or before 29-JAN-2016)  T wave abnormality, consider lateral ischemia  Abnormal ECG  When compared with ECG of 23-DEC-2021 23:51,  No significant change was found  Confirmed by Karime Rosado ([id number]) on 12/24/2021 6:16:18 AM    12/23 ekg:  Normal sinus rhythm  Left anterior fascicular block  Anteroseptal infarct (cited on or before 29-JAN-2016)  T wave abnormality, consider lateral ischemia  Abnormal ECG  When compared with ECG of 23-DEC-2021 23:46, (unconfirmed)  Nonspecific T wave abnormality now evident in Inferior leads  Confirmed by Kenny Ames ([id number]) on 12/24/2021 1:52:10 AM    12/23 ekg:  Normal sinus rhythm  Left anterior fascicular block  Anteroseptal infarct (cited on or before 29-JAN-2016)  T wave abnormality, consider lateral ischemia  Abnormal ECG  When compared with ECG of 29-OCT-2018 11:58,  Questionable change in initial forces of Lateral leads  Non-specific change in ST segment in Anterior leads  Confirmed by Kenny Ames ([id number]) on 12/24/2021 1:52:02 AM      ED Treatment:   Medication Administration from 12/23/2021 2342 to 12/24/2021 2697       Date/Time Order Dose Route Action Action by Comments     12/24/2021 0010 nitroglycerin (NITRO-BID) 2 % TD ointment 1 inch 1 inch Topical Given       12/24/2021 0128 famotidine (PEPCID) tablet 20 mg 20 mg Oral Given          Past Medical History:   Diagnosis Date    Angina pectoris (HCC)     Anxiety     Arthritis     Back pain     CAD (coronary artery disease)     DDD (degenerative disc disease), lumbosacral     Depressed     Full dentures     GERD (gastroesophageal reflux disease)     Hyperlipidemia     Hypertension     MI (myocardial infarction) ([facility] [address] )     2009    Wears glasses      Present on Admission:   Chronic pain syndrome   Chest pain with high risk for cardiac etiology   Coronary artery disease involving native coronary artery of native heart with angina pectoris ([facility] [address] )   Primary hypertension   Episodic cluster headache, not intractable   Anxiety with depression   Mixed hyperlipidemia      Admitting Diagnosis: Chest pain [R07 9]  Old myocardial infarct [I25 2]  Chest pain with high risk for cardiac etiology [R07 9]  Coronary artery disease involving native coronary artery of native heart with angina pectoris ([facility] [address] ) [I25 119]  Age/Sex: 60 y o  male  Admission Orders:  Scheduled Medications:  aspirin, 81 mg, Oral, Daily  atorvastatin, 20 mg, Oral, Daily With Dinner  clonazePAM, 0 5 mg, Oral, BID  ezetimibe, 10 mg, Oral, Daily  gabapentin, 600 mg, Oral, TID  lisinopril, 20 mg, Oral, Daily  mirtazapine, 45 mg, Oral, HS  pantoprazole, 20 mg, Oral, Early Morning  QUEtiapine, 100 mg, Oral, HS  rOPINIRole, 1 mg, Oral, HS  verapamil, 80 mg, Oral, Q12H  vilazodone, 40 mg, Oral, Daily With Breakfast      PRN Meds:  acetaminophen, 975 mg, Oral, Q6H PRN  aluminum-magnesium hydroxide-simethicone, 30 mL, Oral, Q6H PRN  methocarbamol, 750 mg, Oral, Q6H PRN  morphine injection, 2 mg, Intravenous, Q4H PRN  ondansetron, 4 mg, Intravenous, Q6H PRN  oxyCODONE, 5 mg, Oral, Q4H PRN  simethicone, 80 mg, Oral, 4x Daily PRN    Ambulate  cardiac diet  Tele    IP CONSULT TO CARDIOLOGY    Network Utilization Review Department  ATTENTION: Please call with any questions or concerns to [phone] and carefully listen to the prompts so that you are directed to the right person  All voicemails are confidential   Bucky Lara all requests for admission clinical reviews, approved or denied determinations and any other requests to dedicated fax number below belonging to the campus where the patient is receiving treatment   List of dedicated fax numbers for the Facilities:  [address] [place] [address] DENIALS (Administrative/Medical Necessity) [phone]   [address] (Maternity/NICU/Pediatrics) [phone]   [address]  [address] [address] [facility] [phone] [address] [address] An[address] Rosario [address] [address] [phone]

## 2021-12-24 NOTE — ASSESSMENT & PLAN NOTE
· Patient has a history CAD s/p anterior wall MI with stenting left anterior descending artery in 2009  · He is maintained on medical therapy with aspirin 81 mg, statin, ACEI and prn NTG  no beta-blocker due to history of depression  · Followed outpatient by Dr Nayeli Somers, most recently seen 10/05/21 in the office: At the time it was noted patient does get intermittent sharp chest pain, felt to be atypical, and a six-month stress test was recommended  · Patient presented to the ER with chest discomfort, different from his previous symptoms:  7/10 intensity  Was a pressure located in the left anterior chest radiating to the left shoulder and left neck  He had mild associated shortness of breath  He was given nitroglycerin sublingually and aspirin bolus by EMS  Pain was constant until he arrived in the ER, were nitroglycerin paste was applied  Patient notes his pain then decrease to 2/10, and has since completely resolved  · EKGs on admission revealed T-wave inversion in 1 and aVL, as well as V5 and V6  Q-wave in lead V1-V6,  Prior EKGs from 2018 revealed T-wave inversion in V5-V6, and V2  · Patient's 3rd troponin is significantly elevated to 250  · Patient insisting on leaving the hospital immediately due to it being [facility]  · Discussed with cardiologist on-call:  Patient will be seen in consultation:  Patient currently pain-free and hemodynamically stable  · Continue medical therapy with aspirin 81 mg, statin    Hold beta-blocker due to relative bradycardia a prior history of adverse reaction  · Will Confer with Cardiology regarding further testing such as catheterization, patient's presentation is quite concerning for acute ischemia/unstable angina/acute coronary syndrome

## 2021-12-24 NOTE — ASSESSMENT & PLAN NOTE
· Home medications include lisinopril 20 mg and verapamil 80 mg  · Blood pressure adequately controlled:  Low-normal:  Will decrease lisinopril to 10 mg to avoid hypotension and hypoperfusion

## 2021-12-24 NOTE — PLAN OF CARE
Problem: Potential for Falls  Goal: Patient will remain free of falls  Description: INTERVENTIONS:  - Educate patient/family on patient safety including physical limitations  - Instruct patient to call for assistance with activity   - Consult OT/PT to assist with strengthening/mobility   - Keep Call bell within reach  - Keep bed low and locked with side rails adjusted as appropriate  - Keep care items and personal belongings within reach  - Initiate and maintain comfort rounds  - Make Fall Risk Sign visible to staff  - Offer Toileting every 2 Hours, in advance of need  - Initiate/Maintain bed alarm  - Obtain necessary fall risk management equipment: bed alarm   - Apply yellow socks and bracelet for high fall risk patients  - Consider moving patient to room near nurses station  Outcome: Progressing     Problem: CARDIOVASCULAR - ADULT  Goal: Maintains optimal cardiac output and hemodynamic stability  Description: INTERVENTIONS:  - Monitor I/O, vital signs and rhythm  - Monitor for S/S and trends of decreased cardiac output  - Administer and titrate ordered vasoactive medications to optimize hemodynamic stability  - Assess quality of pulses, skin color and temperature  - Assess for signs of decreased coronary artery perfusion  - Instruct patient to report change in severity of symptoms  Outcome: Progressing  Goal: Absence of cardiac dysrhythmias or at baseline rhythm  Description: INTERVENTIONS:  - Continuous cardiac monitoring, vital signs, obtain 12 lead EKG if ordered  - Administer antiarrhythmic and heart rate control medications as ordered  - Monitor electrolytes and administer replacement therapy as ordered  Outcome: Progressing

## 2021-12-24 NOTE — ASSESSMENT & PLAN NOTE
· CAD s/p anterior wall MI with stenting left anterior descending artery in 2009  On low-dose ASA, statin, ACEI and prn NTG    Followed outpatient by Dr Jf Roman, "Not on beta-blocker in light of history of depression '

## 2021-12-24 NOTE — PROGRESS NOTES
[address] [place] [address]  Progress Note Carolyn Lee 1965, 60 y o  male MRN: [id number]  Unit/Bed#: E4 -01 Encounter: [id number]  Primary Care Provider: Marialuisa Polanco DO   Date and time admitted to hospital: 12/23/2021 11:42 PM    Coronary artery disease involving native coronary artery of native heart with angina pectoris [facility])  Assessment & Plan  · Patient has a history CAD s/p anterior wall MI with stenting left anterior descending artery in 2009  · He is maintained on medical therapy with aspirin 81 mg, statin, ACEI and prn NTG  no beta-blocker due to history of depression  · Followed outpatient by Dr Lena Dorantes, most recently seen 10/05/21 in the office: At the time it was noted patient does get intermittent sharp chest pain, felt to be atypical, and a six-month stress test was recommended  · Patient presented to the ER with chest discomfort, different from his previous symptoms:  7/10 intensity  Was a pressure located in the left anterior chest radiating to the left shoulder and left neck  He had mild associated shortness of breath  He was given nitroglycerin sublingually and aspirin bolus by EMS  Pain was constant until he arrived in the ER, were nitroglycerin paste was applied  Patient notes his pain then decrease to 2/10, and has since completely resolved  · EKGs on admission revealed T-wave inversion in 1 and aVL, as well as V5 and V6  Q-wave in lead V1-V6,  Prior EKGs from 2018 revealed T-wave inversion in V5-V6, and V2  · Patient's 3rd troponin is significantly elevated to 250  · Patient insisting on leaving the hospital immediately due to it being [facility]  · Discussed with cardiologist on-call:  Patient will be seen in consultation:  Patient currently pain-free and hemodynamically stable  · Continue medical therapy with aspirin 81 mg, statin    Hold beta-blocker due to relative bradycardia a prior history of adverse reaction  · Will Confer with Cardiology regarding further testing such as catheterization, patient's presentation is quite concerning for acute ischemia/unstable angina/acute coronary syndrome    Chest pain with high risk for cardiac etiology  Assessment & Plan  -patient has a history of coronary artery disease as described above   -he presented with new onset chest pain, different from his previous, sharp, atypical chest pain  -admitted to the hospital noted to have a positive troponin  -currently pain-free:  Awaiting Cardiology consultation    Chronic pain syndrome  Assessment & Plan  · Chronic pain treated with gabapentin 600mg TID, PRN Robaxin and Tramadol 50mg daily prn, continue  Tramadol verified on PDMP  Episodic cluster headache, not intractable  Assessment & Plan  · Continue home verapamil    Mixed hyperlipidemia  Assessment & Plan  · Continue statin and zetia    Anxiety with depression  Assessment & Plan  · Continue Vilazodone, mirtazapine, Seroquel and clonazepam     · PDMP reviewed, patient on clonazepam 0 5mg b i d  P r n  Primary hypertension  Assessment & Plan  · Home medications include lisinopril 20 mg and verapamil 80 mg  · Blood pressure adequately controlled:  Low-normal:  Will decrease lisinopril to 10 mg to avoid hypotension and hypoperfusion            Family:  Called and updated patient's wife Keisha Ha and son Kim Wagner via phone  Reviewed test results and treatment plan  Discussed with cardiologist:  Dr Edward Pina  Patient be seen in consultation  Discussed with patient's nurse at bedside    VTE Pharmacologic Prophylaxis: Heparin  VTE Mechanical Prophylaxis: sequential compression device        ===================================================================    Subjective:  Patient relates the chest pain he had yesterday had the acute onset of 8:00 p m   It was a discomfort type of pain, different from any previous pain  It was not sharp    It was a pressure sensation in his left anterior chest that radiated to his left shoulder and left jaw  He had mild associated shortness of breath but no nausea or diaphoresis  He notes his symptoms were different from his MI and 2009  Patient 2 nitroglycerin without relief and called EMS  In EMS he was given aspirin 324 mg of brought to the ER  He notes initially his pain was 7/10 intensity  In the ER he was given nitroglycerin paste any notes his pain and decreased to 2/10  His pain is completely resolved at this time    Patient notes he has been at his baseline  He denies any recent infection, fever, chills, cough, chest congestion, or phlegm  He denies any shortness of breath or dyspnea on exertion  He denies any decreased exercise tolerance  He notes he has been at his baseline  He denies any dizziness, lightheadedness  Patient relates that he wishes to leave the hospital immediately  He states he will wait 30 minutes for the cardiologist to arrive, but no longer  Physical Exam:   Temp:  [97 6 °F (36 4 °C)-98 5 °F (36 9 °C)] 97 6 °F (36 4 °C)  HR:  [60-70] 64  Resp:  [16-21] 18  BP: (105-126)/(57-73) 105/69    Gen:  Pleasant, non-tachypnic, non-dyspnic  Conversant  Heart: regular rate and rhythm, S1S2 present, no murmur, rub or gallop  Lungs: clear to ausculatation bilaterally  No wheezing, crackles, or rhonchi  No accessory muscle use or respiratory distress  Abd: soft, non-tender, non-distended  NABS, no guarding, rebound or peritoneal signs  Extremities: no clubbing, cyanosis or edema  2+pedal pulses bilaterally  Full range of motion  Neuro: awake, alert  Fluent speech  Strength globally intact  Skin: warm and dry: no petechiae, purpura and rash      LABS:   Results from last 7 days   Lab Units 12/24/21  0413 12/23/21  2354   WBC Thousand/uL 7 64 8 12   HEMOGLOBIN g/dL 14 3 14 7   HEMATOCRIT % 41 0 42 8   PLATELETS Thousands/uL 183 165     Results from last 7 days   Lab Units 12/24/21  0413 12/23/21  2354   POTASSIUM mmol/L 4 4 5 0   CHLORIDE mmol/L 105 106 CO2 mmol/L 31 27   BUN mg/dL 15 12   CREATININE mg/dL 0 89 0 67   CALCIUM mg/dL 8 9 8 6       Hospital Data:  Chest x-ray:  Preliminary report:  No acute disease  No airspace disease, effusion, pneumothorax  Official reading pending          ---------------------------------------------------------------------------------------------------------------  This note has been constructed using a voice recognition system

## 2021-12-24 NOTE — ASSESSMENT & PLAN NOTE
· Continue Vilazodone, mirtazapine, Seroquel and clonazepam     · PDMP reviewed, patient on clonazepam 0 5mg b i d  P r aroldo Hardwick

## 2021-12-24 NOTE — ASSESSMENT & PLAN NOTE
· Continue Vilazodone, mirtazapine, Seroquel and clonazepam     · PDMP reviewed, patient on clonazepam 0 5mg b i d  P lidia Munoz Cassette

## 2021-12-24 NOTE — ASSESSMENT & PLAN NOTE
-patient has a history of coronary artery disease as described above   -he presented with new onset chest pain, different from his previous, sharp, atypical chest pain  -admitted to the hospital noted to have a positive troponin  -currently pain-free:  Awaiting Cardiology consultation

## 2021-12-24 NOTE — CASE MANAGEMENT
Case Management Assessment & Discharge Planning Note    Patient name Justin Reyna  Location [place] [address] /E4 -* MRN [id number]  : 1965 Date 2021       Current Admission Date: 2021  Current Admission Diagnosis:Chronic pain syndrome   Patient Active Problem List    Diagnosis Date Noted    Episodic cluster headache, not intractable 2021    Chronic pain syndrome 2021    Adenopathy 11/15/2018    Mixed hyperlipidemia 2018    Chest pain with high risk for cardiac etiology 2016    Primary hypertension     Old myocardial infarct     Anxiety with depression     Coronary artery disease involving native coronary artery of native heart with angina pectoris ([facility] [address] )     Anxiety       LOS (days): 0  Geometric Mean LOS (GMLOS) (days):   Days to GMLOS:     OBJECTIVE:              Current admission status: Inpatient       Preferred Pharmacy:   [facility] [address] , [place] - [address]  [address] [place] [id number]  Phone: [phone] Fax: [phone]    Primary Care Provider: Porfirio Adamson DO    Primary Insurance: Chuyita Penaloza [facility]  Secondary Insurance: Makenzie Stevenson    ASSESSMENT:  Active Health Care Agents    There are no active Health Care Agents on file  Advance Directives  Does patient have a [address]?: No  Was patient offered paperwork?: Yes (family declined)  Does patient have Advance Directives?: No  Was patient offered paperwork?: Yes (family declined)      Patient Information  Admitted from[de-identified] Home  Mental Status: Alert  During Assessment patient was accompanied by: Spouse,Son  Assessment information provided by[de-identified] Spouse,Son  Primary Caregiver: Self  Support Systems: Spouse/significant other,Son  [place] of Residence: [address] [place] [address] do you live in?: [address] [place] [address] entry access options   Select all that apply : Stairs  Number of steps to enter home : One Flight  Do the steps have railings?: Yes  Type of Current Residence: 2 story home  Upon entering residence, is there a bedroom on the main floor (no further steps)?: No  A bedroom is located on the following floor levels of residence (select all that apply):: 2nd Floor  Upon entering residence, is there a bathroom on the main floor (no further steps)?: Yes  Number of steps to 2nd floor from main floor: One Flight  In the last 12 months, was there a time when you were not able to pay the mortgage or rent on time?: No  In the last 12 months, how many places have you lived?: 1  In the last 12 months, was there a time when you did not have a steady place to sleep or slept in a shelter (including now)?: No  Homeless/housing insecurity resource given?: N/A  Living Arrangements: Lives w/ Spouse/significant other,Other (Comment) (children)  Is patient a ?: No    Activities of Daily Living Prior to Admission  Functional Status: Independent  Completes ADLs independently?: Yes  Ambulates independently?: Yes  Does patient use assisted devices?: No  Does patient currently own DME?: No  Does patient have a history of Outpatient Therapy (PT/OT)?: No  Does the patient have a history of Short-Term Rehab?: No  Does patient have a history of HHC?: No  Does [place] currently have [place] [id number]?: No         Patient Information Continued  Does patient have prescription coverage?: Yes  Within the past 12 months, you worried that your food would run out before you got the money to buy more : Never true  Within the past 12 months, the food you bought just didnt last and you didnt have money to get more : Never true  Food insecurity resource given?: N/A  Does patient receive dialysis treatments?: No  Does patient have a history of substance abuse?: No  Does patient have a history of Mental Health Diagnosis?: No      Means of Transportation  Means of Transport to Appts[de-identified] Drives Self  In the past 12 months, has lack of transportation kept you from medical appointments or from getting medications?: No  In the past 12 months, has lack of transportation kept you from meetings, work, or from getting things needed for daily living?: No  Was application for public transport provided?: N/A      DISCHARGE DETAILS:    Discharge planning discussed with[de-identified] Pt's spouse and son at bedside  Freedom of Choice: Yes  Comments - Freedom of Choice: Pt with Non STEMI MI, IV Heparin  Wanted to leave AMA and return on Mon for cath, but agreeable to stay  Pt to go for cardiac cath on Mon 12/27/21   D/C when medically stable after Cardiac cath 12/27/21  CM contacted family/caregiver?: Yes  Were Treatment Team discharge recommendations reviewed with patient/caregiver?: Yes  Did patient/caregiver verbalize understanding of patient care needs?: Yes  Were patient/caregiver advised of the risks associated with not following Treatment Team discharge recommendations?: Yes

## 2021-12-24 NOTE — CONSULTS
Consultation - Cardiology   [facility] 60 y o  male MRN: [id number]  Unit/Bed#: E4 -01 Encounter: [id number]    Physician Requesting Consult: Lynne Vines MD  Reason for Consult / Principal Problem:     Non ST-elevation MI  Consulting physician:  Jah Calloway MD      Assessment/Plan     Assessment:  · Non ST-elevation MI  · Stent of the left anterior descending artery 2009 for a STEMI  · Hyperlipidemia:  Cholesterol 139, triglycerides 213, HDL 27, LDL calculated 69, non HDL cholesterol 112  · Former smoker  · Primary hypertension  · History of anxiety and depression, not on a beta-blocker because it exacerbates his depression    Plan:  · Begin Plavix with a loading dose today followed by maintenance tomorrow  · Recommend cardiac catheterization Monday sooner if pain reoccurs  · Patient adamant about leaving against medical advice but would be willing to come back for cardiac catheterization on Monday  Unfortunately after he leaves the hospital he becomes an elective case unless he appears in the ED on Monday and is NPO after midnight  · If patient changes his mind and is agreeable to stay, would consider beginning IV heparin and will schedule inpatient catheterization for Monday     History of Present Illness   HPI: [facility] is a 60y o  year old male who presents with left precordial heaviness radiating into his left shoulder and left arm and radiating up his neck  Patient had a history of a STEMI in 2009 treated with stent of the LAD  He is followed as an outpatient by Dr Alisa Corrales  He reported a different type of pain in the past to Dr Alisa Corrales which was atypical for cardiac pain  The current pain which brought him to the hospital is classic  He had mild shortness of breath accompanying the pain but no other associated symptoms  He took 2 sublingual nitroglycerin at home  He was given aspirin and sublingual nitroglycerin by EMS    His pain decreased in intensity but was not relieved until he was placed on nitropaste in the ED  His EKG demonstrates a prior anterior septal myocardial infarction with subtle T-wave changes in the lateral precordium superimposed on prior T-wave changes  His troponins have been positive with the 3rd troponin elevated at 250  Patient presently pain-free and insistent about leaving the hospital against medical advice because of it being [place]      Patient Active Problem List    Diagnosis Date Noted    Episodic cluster headache, not intractable 12/24/2021    Chronic pain syndrome 12/24/2021    Adenopathy 11/15/2018    Mixed hyperlipidemia 02/20/2018    Chest pain with high risk for cardiac etiology 01/29/2016    Primary hypertension     Old myocardial infarct     Anxiety with depression     Coronary artery disease involving native coronary artery of native heart with angina pectoris ([address] )     Anxiety        Vitals: /69 (BP Location: Right arm)   Pulse 64   Temp 97 6 °F (36 4 °C) (Temporal)   Resp 18   Ht 5' 10" (1 778 m)   Wt 74 4 kg (164 lb)   SpO2 99%   BMI 23 53 kg/m²   PREVIOUS WEIGHTS:   Wt Readings from Last 5 Encounters:   12/23/21 74 4 kg (164 lb)   10/05/21 70 9 kg (156 lb 3 2 oz)   04/06/21 70 3 kg (155 lb)   09/29/20 74 4 kg (164 lb)   03/24/20 73 5 kg (162 lb)       Labs/Data:        Results from last 7 days   Lab Units 12/24/21  0413 12/23/21  2354   WBC Thousand/uL 7 64 8 12   HEMOGLOBIN g/dL 14 3 14 7   HEMATOCRIT % 41 0 42 8   MCV fL 91 89   MCH pg 31 8 30 5   MCHC g/dL 34 9 34 3   PLATELETS Thousands/uL 183 165     Results from last 7 days   Lab Units 12/24/21  0413 12/23/21  2354   EGFR ml/min/1 73sq m 94 105   SODIUM mmol/L 139 138   POTASSIUM mmol/L 4 4 5 0   CHLORIDE mmol/L 105 106   CO2 mmol/L 31 27   BUN mg/dL 15 12   CREATININE mg/dL 0 89 0 67     Results from last 7 days   Lab Units 12/24/21  0413 12/23/21  2354   CALCIUM mg/dL 8 9 8 6           Current Facility-Administered Medications:     acetaminophen (TYLENOL) tablet 975 mg, 975 mg, Oral, Q6H PRN, Tejada Breslow, CRNP    aluminum-magnesium hydroxide-simethicone (MYLANTA) oral suspension 30 mL, 30 mL, Oral, Q6H PRN, Tejada Breslow, CRNP    aspirin (ECOTRIN LOW STRENGTH) EC tablet 81 mg, 81 mg, Oral, Daily, Tejada Breslow, CRNP, 81 mg at 12/24/21 0848    atorvastatin (LIPITOR) tablet 20 mg, 20 mg, Oral, Daily With Dinner, Tejada Breslow, CRNP    clonazePAM (KlonoPIN) tablet 0 5 mg, 0 5 mg, Oral, BID, Tejada Breslow, CRNP, 0 5 mg at 12/24/21 0848    clopidogrel (PLAVIX) tablet 300 mg, 300 mg, Oral, Once, MD Erica Arias  [facility] ON 12/25/2021] clopidogrel (PLAVIX) tablet 75 mg, 75 mg, Oral, Daily, Melisa Gay MD    ezetimibe (ZETIA) tablet 10 mg, 10 mg, Oral, Daily, Tejada Breslow, CRNP, 10 mg at 12/24/21 0848    gabapentin (NEURONTIN) capsule 600 mg, 600 mg, Oral, TID, Tejada Breslow, CRNP, 600 mg at 12/24/21 0848    heparin (porcine) subcutaneous injection 5,000 Units, 5,000 Units, Subcutaneous, Q8H [address], Zander Koo MD, 5,000 Units at 12/24/21 0849    lisinopril (ZESTRIL) tablet 10 mg, 10 mg, Oral, Daily, Zander Koo MD, 10 mg at 12/24/21 0849    methocarbamol (ROBAXIN) tablet 750 mg, 750 mg, Oral, Q6H PRN, Tejada Breslow, CRNP    mirtazapine (REMERON) tablet 45 mg, 45 mg, Oral, HS, Tejada Breslow, CRNP    morphine injection 2 mg, 2 mg, Intravenous, Q4H PRN, Tejada Breslow, CRNP    ondansetron [facility]) injection 4 mg, 4 mg, Intravenous, Q6H PRN, Tejada Breslow, CRNP    oxyCODONE (ROXICODONE) IR tablet 5 mg, 5 mg, Oral, Q4H PRN, JOHNNIE Her    pantoprazole (PROTONIX) EC tablet 20 mg, 20 mg, Oral, Early Morning, Tejadasatish Acosta, JOHNNIE, 20 mg at 12/24/21 0559    QUEtiapine (SEROquel) tablet 100 mg, 100 mg, Oral, HS, Tejada Dave, LAURANP    rOPINIRole (REQUIP) tablet 1 mg, 1 mg, Oral, HS, Tejada Breslloyd, LAURANP    simethicone (MYLICON) chewable tablet 80 mg, 80 mg, Oral, 4x Daily PRN, Foster Alvares CRNP    verapamil (CALAN) tablet 80 mg, 80 mg, Oral, Q12H, Foster Alvares CRNP, 80 mg at 21 0354    vilazodone (VIIBRYD) tablet 40 mg, 40 mg, Oral, Daily With Breakfast, Foster Alvares, CRNP, 40 mg at 21 0849  No Known Allergies    Historical Information   Past Medical History:   Diagnosis Date    [facility]     Anxiety     Arthritis     Back pain     CAD (coronary artery disease)     DDD (degenerative disc disease), lumbosacral     Depressed     Full dentures     GERD (gastroesophageal reflux disease)     Hyperlipidemia     Hypertension     [facility]     2009    Wears glasses      Past Surgical History:   Procedure Laterality Date    ANGIOPLASTY       LAD    CARDIAC CATHETERIZATION      MD REMOVE ARMPIT LYMPH NODES SUPERFIC Right 2018    Procedure: EXCISION BIOPSY LYMPH NODE AXILLARY RIGHT;  Surgeon: Juan Hobson MD;  Location: [facility];  Service: General     Past Surgical History:   Procedure Laterality Date    ANGIOPLASTY       LAD    CARDIAC CATHETERIZATION      MD REMOVE ARMPIT LYMPH NODES SUPERFIC Right 2018    Procedure: EXCISION BIOPSY LYMPH NODE AXILLARY RIGHT;  Surgeon: Juan Hobson MD;  Location: [facility];  Service: General     Social History:  Social History     Substance and Sexual Activity   Alcohol Use No     Social History     Substance and Sexual Activity   Drug Use No     Social History     Tobacco Use   Smoking Status Former Smoker    Quit date: 2008    Years since quittin 1   Smokeless Tobacco Never Used     Social History     Socioeconomic History    Marital status: /Civil Union     Spouse name: Not on file    Number of children: Not on file    Years of education: Not on file    Highest education level: Not on file   Occupational History    Not on file   Tobacco Use    Smoking status: Former Smoker     Quit date: 2008     Years since quittin 1    Smokeless tobacco: Never Used   Substance and Sexual Activity    Alcohol use: No    Drug use: No    Sexual activity: Not on file   Other Topics Concern    Not on file   Social History Narrative    Not on file     Social Determinants of Health     Financial Resource Strain: Not on file   Food Insecurity: Not on file   Transportation Needs: Not on file   Physical Activity: Not on file   Stress: Not on file   Social Connections: Not on file   Intimate Partner Violence: Not on file   Housing Stability: Not on file      Family History:   family history includes Heart attack in his father; Heart disease in his brother  No intake or output data in the 24 hours ending 21 0917    Invasive Devices  Report    Peripheral Intravenous Line            Peripheral IV 21 Left Antecubital <1 day                Review of Systems   Constitutional: Negative  HENT: Negative  Eyes: Negative  Respiratory: Negative for chest tightness and shortness of breath  Cardiovascular: Negative for chest pain, palpitations and leg swelling  Gastrointestinal: Negative  Endocrine: Negative  Musculoskeletal: Negative  Skin: Negative  Allergic/Immunologic: Negative  Neurological: Negative  Hematological: Negative  Psychiatric/Behavioral: Negative  Physical Exam  Vitals reviewed  Constitutional:       General: He is not in acute distress  Appearance: Normal appearance  He is well-developed and normal weight  HENT:      Head: Normocephalic and atraumatic  Neck:      Thyroid: No thyromegaly  Vascular: No carotid bruit or JVD  Trachea: No tracheal deviation  Cardiovascular:      Rate and Rhythm: Normal rate and regular rhythm  Pulses: Normal pulses  Heart sounds: Normal heart sounds  No murmur heard  No friction rub  No gallop  Pulmonary:      Effort: Pulmonary effort is normal  No respiratory distress  Breath sounds: Normal breath sounds  No wheezing, rhonchi or rales  Chest:      Chest wall: No tenderness  Abdominal:      General: Bowel sounds are normal  There is no distension  Palpations: Abdomen is soft  Tenderness: There is no abdominal tenderness  Musculoskeletal:      Cervical back: Normal range of motion and neck supple  Right lower leg: No edema  Left lower leg: No edema  Skin:     General: Skin is warm and dry  Neurological:      General: No focal deficit present  Mental Status: He is alert and oriented to person, place, and time  Gait: Gait normal    Psychiatric:         Mood and Affect: Mood normal          Behavior: Behavior normal            ======================================================     Imaging:   I have personally reviewed pertinent reports  EKG:  Normal sinus rhythm  Prior anterior septal myocardial infarction  Lateral T-wave inversion which was present on EKG from 2018 but recent tracings demonstrate more of a symmetrical nature to the T-wave inversion which is commonly seen with ischemia      Portions of the record may have been created with voice recognition software  Occasional wrong word or "sound a like" substitutions may have occurred due to the inherent limitations of voice recognition software  Read the chart carefully and recognize, using context, where substitutions have occurred      Loren Oconnor MD

## 2021-12-24 NOTE — QUICK NOTE
D/w Cardiologist: Dr Shakila Messer: recommend pt remain in the hospital for cardiac catheterization  Patient is currently refusing and insisting discharge  Agree with Cardiology assessment that patient is not safe for discharge will be at risk for MI    Again called patient's family, spoke on speaker phone with his wife Flynn Stoll, along with daughter Audie Brown  Discussed with him my concerns that it is not safe for him to sign himself out of the hospital   They are in agreement, however Patient has capacity to make his own medical decisions  Requested they come to the hospital to discussed with patient further our recommendations, and their support of our recommendations that he remain in the hospital for cardiac catheterization  Notified nursing supervisor, of request for family visitation, as it is outside of regular visiting hours  Their visitation was approved      D/w cardiology:  Recommend plavix load, heparin ACS infusion

## 2021-12-24 NOTE — ED PROVIDER NOTES
History  Chief Complaint   Patient presents with    Chest Pain     c/o 8/10 left sided chest pain with rad to jaw and neck  took 2 nitro prior to ems call  ems places 20# IV, gave 1 subl nitro and 324 asa  pain down to 6/10     A 57-year-old male with past history of CAD s/p stent, hyperlipidemia, hypertension and GERD; presents with left-sided chest pain that began approximately 45 minutes prior to arrival   Pain radiates toward the left shoulder and left neck  Patient did take several tablets of sublingual nitro without relief of his chest pain  Patient denies associated diaphoresis, dizziness, lightheadedness, shortness of breath, nausea and vomiting  Patient does report a history of CAD, however denies frequent chest pain  Patient was in his usual state of health prior to onset of his symptoms  Patient denies recent fever, chills, cough, shortness of breath, abdominal pain, nausea, vomiting, diarrhea, peripheral edema and rashes  A/P:  Chest pain, with a history of CAD  EKG is unchanged from prior  Concern for ACS  Will check lab work  Will place nitro paste  History provided by:  Patient and medical records      Prior to Admission Medications   Prescriptions Last Dose Informant Patient Reported? Taking? QUEtiapine (SEROquel) 100 mg tablet  Self Yes No   Sig: Take 100 mg by mouth daily at bedtime  VIIBRYD 40 MG tablet  Self Yes No   Sig: Take 40 mg by mouth daily with breakfast     acetaminophen (TYLENOL) 500 mg tablet  Self Yes No   Sig: Take 500 mg by mouth every 12 (twelve) hours as needed for mild pain   aspirin 81 MG tablet  Self Yes No   Sig: Take 81 mg by mouth daily     atorvastatin (LIPITOR) 40 mg tablet   No No   Sig: Take 0 5 tablets (20 mg total) by mouth daily   clonazePAM (KlonoPIN) 1 mg tablet  Self Yes No   Sig: TK 1/2 T PO BID PRN   ezetimibe (ZETIA) 10 mg tablet   No No   Sig: TAKE 1 TABLET(10 MG) BY MOUTH DAILY   gabapentin (NEURONTIN) 600 MG tablet  Self Yes No   Sig: Take 600 mg by mouth 3 (three) times a day  ibuprofen (MOTRIN) 600 mg tablet  Self No No   Sig: Take 1 tablet (600 mg total) by mouth every 8 (eight) hours as needed for mild pain for up to 10 days   Patient not taking: Reported on 10/5/2021   lisinopril (ZESTRIL) 40 mg tablet   No No   Sig: Take 0 5 tablets (20 mg total) by mouth daily   methocarbamol (ROBAXIN) 750 mg tablet  Self Yes No   Sig: Take 750 mg by mouth 3 (three) times a day  mirtazapine (REMERON) 45 MG tablet  Self Yes No   Sig: Take 45 mg by mouth daily at bedtime  nitroglycerin (NITROSTAT) 0 4 mg SL tablet   No No   Sig: PLACE 1 TABLET UNDER THE TONGUE EVERY 5 MINS AS NEEDED FOR CHEST PAIN   omeprazole (PriLOSEC) 20 mg delayed release capsule  Self Yes No   Sig: Take 20 mg by mouth 2 (two) times a day  rOPINIRole (REQUIP) 1 mg tablet  Self Yes No   Sig: Take 1 mg by mouth daily  traMADol (ULTRAM) 50 mg tablet  Self Yes No   Sig: Take 50 mg by mouth daily     triamcinolone (KENALOG) 0 1 % cream  Self Yes No   Sig: Apply topically 2 (two) times a day     verapamil (CALAN) 80 mg tablet   No No   Sig: Take 1 tablet (80 mg total) by mouth every 12 (twelve) hours      Facility-Administered Medications: None       Past Medical History:   Diagnosis Date    Angina pectoris (HCC)     Anxiety     Arthritis     Back pain     CAD (coronary artery disease)     DDD (degenerative disc disease), lumbosacral     Depressed     Full dentures     GERD (gastroesophageal reflux disease)     Hyperlipidemia     Hypertension     MI (myocardial infarction) ([facility] [address] )     2009    Wears glasses        Past Surgical History:   Procedure Laterality Date    ANGIOPLASTY  2009     LAD    CARDIAC CATHETERIZATION      TX REMOVE ARMPIT LYMPH NODES SUPERFIC Right 11/21/2018    Procedure: EXCISION BIOPSY LYMPH NODE AXILLARY RIGHT;  Surgeon: Shaun Jeronimo MD;  Location: [facility];  Service: General       Family History   Problem Relation Age of Onset    Heart attack Father     Heart disease Brother      I have reviewed and agree with the history as documented  E-Cigarette/Vaping     E-Cigarette/Vaping Substances     Social History     Tobacco Use    Smoking status: Former Smoker     Quit date: 2008     Years since quittin 1    Smokeless tobacco: Never Used   Substance Use Topics    Alcohol use: No    Drug use: No       Review of Systems   Cardiovascular: Positive for chest pain  All other systems reviewed and are negative  Physical Exam  Physical Exam  General Appearance: alert and oriented, nad, non toxic appearing  Skin:  Warm, dry, intact  Pallor noted  HEENT: atraumatic, normocephalic  Neck: Supple, trachea midline  Cardiac: RRR; no murmurs, rub, gallops  Pulmonary: lungs CTAB; no wheezes, rales, rhonchi    Chest wall nontender  Gastrointestinal: abdomen soft, nontender, nondistended; no guarding or rebound tenderness; good bowel sounds, no mass or bruits  Extremities:  no pedal edema, 2+ pulses; no calf tenderness, no clubbing, no cyanosis  Neuro:  no focal motor or sensory deficits, CN 2-12 grossly intact  Psych:  Normal mood and affect, normal judgement and insight      Vital Signs  ED Triage Vitals   Temperature Pulse Respirations Blood Pressure SpO2   21 0000 21 2349 21 2349 21 2349 21   98 5 °F (36 9 °C) 70 18 116/63 98 %      Temp Source Heart Rate Source Patient Position - Orthostatic VS BP Location FiO2 (%)   21 0000 21 2349 21 2349 21 --   Oral Monitor Lying Right arm       Pain Score       21       6           Vitals:    21 0030 21 0042 21 0045 21 0130   BP: 120/68  115/67 126/67   Pulse: 60 64  60   Patient Position - Orthostatic VS:    Lying         Visual Acuity      ED Medications  Medications   nitroglycerin (NITRO-BID) 2 % TD ointment 1 inch (1 inch Topical Given 21 0010)   famotidine (PEPCID) tablet 20 mg (20 mg Oral Given 12/24/21 0128)       Diagnostic Studies  Results Reviewed     Procedure Component Value Units Date/Time    CBC and differential [[id number]] Collected: 12/23/21 2354    Lab Status: Final result Specimen: Blood from Arm, Left Updated: 12/24/21 0106     WBC 8 12 Thousand/uL      RBC 4 82 Million/uL      Hemoglobin 14 7 g/dL      Hematocrit 42 8 %      MCV 89 fL      MCH 30 5 pg      MCHC 34 3 g/dL      RDW 12 1 %      MPV 11 6 fL      Platelets 956 Thousands/uL      nRBC 0 /100 WBCs     HS Troponin I 2hr [[id number]]     Lab Status: No result Specimen: Blood     HS Troponin I 4hr [[id number]]     Lab Status: No result Specimen: Blood     HS Troponin 0hr (reflex protocol) [[id number]]  (Normal) Collected: 12/23/21 2354    Lab Status: Final result Specimen: Blood from Arm, Left Updated: 12/24/21 0038     hs TnI 0hr 6 ng/L     Basic metabolic panel [[id number]] Collected: 12/23/21 2354    Lab Status: Final result Specimen: Blood from Arm, Left Updated: 12/24/21 0024     Sodium 138 mmol/L      Potassium 5 0 mmol/L      Chloride 106 mmol/L      CO2 27 mmol/L      ANION GAP 5 mmol/L      BUN 12 mg/dL      Creatinine 0 67 mg/dL      Glucose 108 mg/dL      Calcium 8 6 mg/dL      eGFR 105 ml/min/1 73sq m     Narrative:      Tiffany guidelines for Chronic Kidney Disease (CKD):     Stage 1 with normal or high GFR (GFR > 90 mL/min/1 73 square meters)    Stage 2 Mild CKD (GFR = 60-89 mL/min/1 73 square meters)    Stage 3A Moderate CKD (GFR = 45-59 mL/min/1 73 square meters)    Stage 3B Moderate CKD (GFR = 30-44 mL/min/1 73 square meters)    Stage 4 Severe CKD (GFR = 15-29 mL/min/1 73 square meters)    Stage 5 End Stage CKD (GFR <15 mL/min/1 73 square meters)  Note: GFR calculation is accurate only with a steady state creatinine                 XR chest 2 views   ED Interpretation by Kush Quinones DO (12/24 7299)   No acute disease                 Procedures  Procedures   ECG 12 Lead Documentation  Date/Time: today/date: 12/24/2021  Performed by: Laura Woodward    ECG reviewed by me, the ED Provider: yes    Patient location:  ED   Previous ECG:  Compared to current, no change   Rate:  70  ECG rate assessment: normal    Rhythm: sinus rhythm    Ectopy:  none    QRS axis:  Normal  Intervals: normal   Q waves: None   ST segments:  Normal  T waves: inverted in I, aVL, V5-V6      Impression: NSR with T wave inversions laterally (unchanged from prior)           ED Course  ED Course as of 12/24/21 0148   Fri Dec 24, 2021   0041 hs TnI 0hr: 6   0108 Labs otherwise WNL   0117 Pt reports feeling better at this time, however had recurrence of chest pain while standing for XR  Currently pt resting comfortably, overall pt's color has improved  Updated on labs  Given history of CAD, will proceed with admission for ACS rule out  Pt and family in agreement  Pt expresses concern that symptoms may be due to acid reflux, will give a dose of Pepcid now  Patient prefers PO over IV               HEART Risk Score      Most Recent Value   Heart Score Risk Calculator    History 0 Filed at: 12/24/2021 0042   ECG 1 Filed at: 12/24/2021 0042   Age 1 Filed at: 12/24/2021 0042   Risk Factors 2 Filed at: 12/24/2021 0042   Troponin 0 Filed at: 12/24/2021 0042   HEART Score 4 Filed at: 12/24/2021 0042                          JIMENA Risk Score      Most Recent Value   Age >= 72 0 Filed at: 12/24/2021 0134   Known CAD (stenosis >= 50%) 1 Filed at: 12/24/2021 0134   Recent (<=24 hrs) Service Angina 0 Filed at: 12/24/2021 0134   ST Deviation >= 0 5 mm 0 Filed at: 12/24/2021 0134   3+ CAD Risk Factors (FHx, HTN, HLP, DM, Smoker) 0 Filed at: 12/24/2021 0134   Aspirin Use Past 7 Days 1 Filed at: 12/24/2021 0134   Elevated Cardiac Markers 0 Filed at: 12/24/2021 0134   JIMENA Risk Score (Calculated) 2 Filed at: 12/24/2021 0134                  MDM    Disposition  Final diagnoses:   Chest pain     Time reflects when diagnosis was documented in both MDM as applicable and the Disposition within this note     Time User Action Codes Description Comment    12/24/2021  1:33 AM Ankit Noguera Add [R07 9] Chest pain       ED Disposition     ED Disposition Condition Date/Time Comment    Admit Stable Fri Dec 24, 2021  1:33 AM Case was discussed with LATIA and the patient's admission status was agreed to be Admission Status: observation status to the service of Dr Jennifer Browne   Follow-up Information    None         Patient's Medications   Discharge Prescriptions    No medications on file       No discharge procedures on file      PDMP Review       Value Time User    PDMP Reviewed  Yes 12/24/2021 12:41 AM Cecille Mcdonald          ED Provider  Electronically Signed by           Sandhya Abdi DO  12/24/21 8111

## 2021-12-24 NOTE — ASSESSMENT & PLAN NOTE
· Chronic pain treated with gabapentin 600mg TID, PRN Robaxin and Tramadol 50mg daily prn, continue  Tramadol verified on PDMP

## 2021-12-24 NOTE — ASSESSMENT & PLAN NOTE
· Reports chest pain, took NTG x2 with no improvement  Given ASA bolus by EMS  Chest pain improved in ED after Nitro-Bid applied  · Chronic chest pain; followed outpatient by Dr Clarke Palacios, "Since his last visit he has had some atypical chest pain which sounds non anginal "  H/o CAD s/p anterior wall myocardial infarction with stenting left anterior descending artery in 2009  Mild ischemic cardiomyopathy with apical wall motion abnormality, ejection fraction 46% in 2019  · JIMENA Score: 2  · EKG:  Chronic ischemic changes on EKG; normal serum troponin  Cycle troponins and EKG  · Continue daily ASA, statin, ACEI    As per cardiologist, not on BB due to depression  · A1c and lipid panel up to date  · Monitor on telemetry  · Cardiology consult

## 2021-12-24 NOTE — PLAN OF CARE
Problem: Potential for Falls  Goal: Patient will remain free of falls  Description: INTERVENTIONS:  - Educate patient/family on patient safety including physical limitations  - Instruct patient to call for assistance with activity   - Consult OT/PT to assist with strengthening/mobility   - Keep Call bell within reach  - Keep bed low and locked with side rails adjusted as appropriate  - Keep care items and personal belongings within reach  - Initiate and maintain comfort rounds  - Make Fall Risk Sign visible to staff  - Offer Toileting every 2 Hours, in advance of need  - Initiate/Maintain bed alarm  - Obtain necessary fall risk management equipment:   - Apply yellow socks and bracelet for high fall risk patients  - Consider moving patient to room near nurses station  Outcome: Progressing     Problem: CARDIOVASCULAR - ADULT  Goal: Maintains optimal cardiac output and hemodynamic stability  Description: INTERVENTIONS:  - Monitor I/O, vital signs and rhythm  - Monitor for S/S and trends of decreased cardiac output  - Administer and titrate ordered vasoactive medications to optimize hemodynamic stability  - Assess quality of pulses, skin color and temperature  - Assess for signs of decreased coronary artery perfusion  - Instruct patient to report change in severity of symptoms  Outcome: Progressing  Goal: Absence of cardiac dysrhythmias or at baseline rhythm  Description: INTERVENTIONS:  - Continuous cardiac monitoring, vital signs, obtain 12 lead EKG if ordered  - Administer antiarrhythmic and heart rate control medications as ordered  - Monitor electrolytes and administer replacement therapy as ordered  Outcome: Progressing

## 2021-12-24 NOTE — QUICK NOTE
Met with pt's brother and wife at bedside  Gave update and concerns regarding heart attack and need to remain in the hospital for catheterization  Pt agreement with plan and iv heparin infusing  Will be hospitalized >2 midnights due to nstemi and need for cath    Change to inpt status

## 2021-12-25 LAB
APTT PPP: 44 SECONDS (ref 23–37)
APTT PPP: 45 SECONDS (ref 23–37)
APTT PPP: 52 SECONDS (ref 23–37)

## 2021-12-25 PROCEDURE — 85730 THROMBOPLASTIN TIME PARTIAL: CPT | Performed by: FAMILY MEDICINE

## 2021-12-25 PROCEDURE — 99232 SBSQ HOSP IP/OBS MODERATE 35: CPT | Performed by: INTERNAL MEDICINE

## 2021-12-25 PROCEDURE — 99233 SBSQ HOSP IP/OBS HIGH 50: CPT | Performed by: INTERNAL MEDICINE

## 2021-12-25 PROCEDURE — 85730 THROMBOPLASTIN TIME PARTIAL: CPT | Performed by: INTERNAL MEDICINE

## 2021-12-25 RX ORDER — OMEGA-3-ACID ETHYL ESTERS 1 G/1
2 CAPSULE, LIQUID FILLED ORAL 2 TIMES DAILY
Status: DISCONTINUED | OUTPATIENT
Start: 2021-12-25 | End: 2021-12-25

## 2021-12-25 RX ADMIN — HEPARIN SODIUM 15 UNITS/KG/HR: 10000 INJECTION, SOLUTION INTRAVENOUS at 19:49

## 2021-12-25 RX ADMIN — GABAPENTIN 600 MG: 300 CAPSULE ORAL at 21:37

## 2021-12-25 RX ADMIN — VILAZODONE HYDROCHLORIDE 40 MG: 40 TABLET ORAL at 09:20

## 2021-12-25 RX ADMIN — MIRTAZAPINE 45 MG: 15 TABLET, FILM COATED ORAL at 21:37

## 2021-12-25 RX ADMIN — ROPINIROLE 1 MG: 0.25 TABLET, FILM COATED ORAL at 21:37

## 2021-12-25 RX ADMIN — VERAPAMIL HYDROCHLORIDE 80 MG: 80 TABLET ORAL at 15:21

## 2021-12-25 RX ADMIN — CLONAZEPAM 0.5 MG: 0.5 TABLET ORAL at 09:20

## 2021-12-25 RX ADMIN — EZETIMIBE 10 MG: 10 TABLET ORAL at 09:20

## 2021-12-25 RX ADMIN — CLOPIDOGREL BISULFATE 75 MG: 75 TABLET ORAL at 09:20

## 2021-12-25 RX ADMIN — ATORVASTATIN CALCIUM 20 MG: 10 TABLET, FILM COATED ORAL at 17:24

## 2021-12-25 RX ADMIN — VERAPAMIL HYDROCHLORIDE 80 MG: 80 TABLET ORAL at 03:57

## 2021-12-25 RX ADMIN — GABAPENTIN 600 MG: 300 CAPSULE ORAL at 17:24

## 2021-12-25 RX ADMIN — QUETIAPINE FUMARATE 100 MG: 100 TABLET ORAL at 21:37

## 2021-12-25 RX ADMIN — PANTOPRAZOLE SODIUM 20 MG: 20 TABLET, DELAYED RELEASE ORAL at 05:45

## 2021-12-25 RX ADMIN — CLONAZEPAM 0.5 MG: 0.5 TABLET ORAL at 17:23

## 2021-12-25 RX ADMIN — LISINOPRIL 10 MG: 10 TABLET ORAL at 09:20

## 2021-12-25 RX ADMIN — GABAPENTIN 600 MG: 300 CAPSULE ORAL at 09:20

## 2021-12-25 RX ADMIN — ASPIRIN 81 MG: 81 TABLET, COATED ORAL at 09:20

## 2021-12-25 NOTE — PROGRESS NOTES
Progress Note Lucas Fortune 60 y o  male MRN: [id number]    Unit/Bed#: E4 -01 Encounter: [id number]    Assessment/Plan:    NSTEMI   reviewed with Cardiology recommending cardiac catheterization, continue IV heparin with Plavix    Hypertension   controlled with lisinopril and verapamil    Dyslipidemia   continue statin and Zetia    Chronic pain syndrome continue gabapentin Robaxin and tramadol    MDD    mood stable with current medication    Subjective:   Denies chest pain or shortness of breath currently no nausea vomiting no fevers chills appetite stable      Objective:     Vitals: Blood pressure 126/81, pulse 70, temperature 97 8 °F (36 6 °C), temperature source Temporal, resp  rate 18, height 5' 10" (1 778 m), weight 74 4 kg (164 lb), SpO2 96 %  ,Body mass index is 23 53 kg/m²        Results from last 7 days   Lab Units 12/24/21 1736 12/24/21 0413   WBC Thousand/uL  --  7 64   HEMOGLOBIN g/dL  --  14 3   HEMATOCRIT %  --  41 0   PLATELETS Thousands/uL  --  183   INR  1 14  --      Results from last 7 days   Lab Units 12/24/21  0413   POTASSIUM mmol/L 4 4   CHLORIDE mmol/L 105   CO2 mmol/L 31   BUN mg/dL 15   CREATININE mg/dL 0 89   CALCIUM mg/dL 8 9       Scheduled Meds:  Current Facility-Administered Medications   Medication Dose Route Frequency Provider Last Rate    acetaminophen  975 mg Oral Q6H PRN Leeland Power, CRNP      aluminum-magnesium hydroxide-simethicone  30 mL Oral Q6H PRN Leeland Power, CRNP      aspirin  81 mg Oral Daily Leeland Power, CRNP      atorvastatin  20 mg Oral Daily With [employer], CRNP      clonazePAM  0 5 mg Oral BID Leeland Power, CRNP      clopidogrel  75 mg Oral Daily Joseph Ly MD      ezetimibe  10 mg Oral Daily Leeland Power, CRNP      gabapentin  600 mg Oral TID Leeland Power, CRNP      heparin (porcine)  3-20 Units/kg/hr (Order-Specific) Intravenous Titrated Homar Anglin MD 13 Units/kg/hr (12/25/21 1734)  lisinopril  10 mg Oral Daily Shayy Puri MD      methocarbamol  750 mg Oral Q6H PRN Enzo Loge, CRNP      mirtazapine  45 mg Oral HS Enzo Loge, CRNP      morphine injection  2 mg Intravenous Q4H PRN Enzo Loge, CRNP      ondansetron  4 mg Intravenous Q6H PRN Enzo Loge, CRNP      oxyCODONE  5 mg Oral Q4H PRN Enzo Loge, CRNP      pantoprazole  20 mg Oral Early Morning Enzo Loge, CRNP      QUEtiapine  100 mg Oral HS Enoz Loge, CRNP      rOPINIRole  1 mg Oral HS Enzo Loge, CRNP      simethicone  80 mg Oral 4x Daily PRN Enzo Loge, CRNP      verapamil  80 mg Oral Q12H Enzo Loge, CRNP      vilazodone  40 mg Oral Daily With Breakfast Enzo Loge, CRNP         Continuous Infusions:  heparin (porcine), 3-20 Units/kg/hr (Order-Specific), Last Rate: 13 Units/kg/hr (12/25/21 0951)          Physical exam:  General appearance:  Alert no distress interaction appropriate  Head/Eyes:  Nonicteric PERRL EOMI  Neck:  Supple  Lungs:  Decreased BS bilateral no wheezing rhonchi or rales  Heart: normal S1 S2 regular  Abdomen: Soft nontender with bowel sounds  Extremities: no edema  Skin: no rash    Invasive Devices  Report    Peripheral Intravenous Line            Peripheral IV 12/25/21 Right;Ventral (anterior) Forearm <1 day                  VTE Pharmacologic Prophylaxis:  Heparin      Counseling / Coordination of Care  Total floor / unit time spent today 30  minutes  Greater than 50% of total time was spent with the patient and / or family counseling and / or coordination of care  A description of the counseling / coordination of care: Mandeep Dailey

## 2021-12-25 NOTE — UTILIZATION REVIEW
Continued Stay Review  SEE INITIAL IP REVIEW COMPLETED ON 12/24 BY TRAE FAJARDO      Date: 12/25 inpatient day 2                          Current Patient Class: inpatient  Current Level of Care: med surg    HPI:56 y o  male initially admitted on 12/24 under obs, converted to IP on 12/24 due to NSTEMI requiring cardiac cath, unstable angina, r/o ACS  Assessment/Plan: 12/25 waiting for cardiac cath  Has no complaints, lungs decreased, no edema  No complaints or recurrent pain  Per cardio: cardiac cath will be done on Monday, 12/27  Remains on IV heparin, no chest pain thus far today  Exam unremarkable  Date 12/26: IP day 3: feels well today, no chest pain  continue medical therapy with med adjustments, bp is low normal requiring reduction in ace dose to avoid hypotension and hypoperfusion  pt wanted to leave AMA but family convinced him to stay  He will need another 24-48 hours in house  Per cardio: waiting for cardiac cath  Had 3 episodes nonsustained VT overnight, 2 episodes 6 beats, 1 episode was 4 beats  Denies chest pain  Needs echo 1st thing in the morning then cath  Exam unremarkable  EKG nsr       Vital Signs:   Date/Time Temp Pulse Resp BP MAP (mmHg) SpO2 O2 Device Patient Position - Orthostatic VS   12/25/21 0716 98 °F (36 7 °C) 62 16 157/75 83 96 % None (Room air) Lying   12/25/21 0354 97 6 °F (36 4 °C) 58 16 132/72 97 96 % -- Lying   12/24/21 2345 98 °F (36 7 °C) 52 Abnormal  16 146/78 106 97 % -- Lying   12/24/21 1932 98 1 °F (36 7 °C) 55 16 127/66 91 96 % -- Lying   12/24/21 1457 98 2 °F (36 8 °C) 64 18 121/72 85 96 % None (Room air) Lying   12/24/21 1124 97 8 °F (36 6 °C) 60 16 109/73 80 97 % None (Room air) Lying         Pertinent Labs/Diagnostic Results:     12/24 CXR: nothing acute  12/24 EKG Sinus bradycardia  Left anterior fascicular block  Anteroseptal infarct (cited on or before 29-JAN-2016)  T wave abnormality, consider lateral ischemia    12/24 EKG#2 Normal sinus rhythm  Left axis deviation  Anteroseptal infarct (cited on or before 29-JAN-2016)  T wave abnormality, consider lateral ischemia    Results from last 7 days   Lab Units 12/24/21  0413 12/23/21  2354   WBC Thousand/uL 7 64 8 12   HEMOGLOBIN g/dL 14 3 14 7   HEMATOCRIT % 41 0 42 8   PLATELETS Thousands/uL 183 165   NEUTROS ABS Thousands/µL 4 20  --          Results from last 7 days   Lab Units 12/24/21  0413 12/23/21  2354   SODIUM mmol/L 139 138   POTASSIUM mmol/L 4 4 5 0   CHLORIDE mmol/L 105 106   CO2 mmol/L 31 27   ANION GAP mmol/L 3* 5   BUN mg/dL 15 12   CREATININE mg/dL 0 89 0 67   EGFR ml/min/1 73sq m 94 105   CALCIUM mg/dL 8 9 8 6             Results from last 7 days   Lab Units 12/24/21  0413 12/23/21  2354   GLUCOSE RANDOM mg/dL 100 108         Results from last 7 days   Lab Units 12/24/21  0413 12/24/21  0216 12/23/21  2354   HS TNI 0HR ng/L  --   --  6   HS TNI 2HR ng/L  --  29  --    HSTNI D2 ng/L  --  23  --    HS TNI 4HR ng/L 250*  --   --    HSTNI D4 ng/L 244  --   --          Results from last 7 days   Lab Units 12/25/21  0906 12/25/21  0107 12/24/21  1736   PROTIME seconds  --   --  14 4   INR   --   --  1 14   PTT seconds 45* 44* 128*       Medications:   Scheduled Medications:  aspirin, 81 mg, Oral, Daily  atorvastatin, 20 mg, Oral, Daily With Dinner  clonazePAM, 0 5 mg, Oral, BID  clopidogrel, 75 mg, Oral, Daily  ezetimibe, 10 mg, Oral, Daily  gabapentin, 600 mg, Oral, TID  lisinopril, 10 mg, Oral, Daily  mirtazapine, 45 mg, Oral, HS  pantoprazole, 20 mg, Oral, Early Morning  QUEtiapine, 100 mg, Oral, HS  rOPINIRole, 1 mg, Oral, HS  verapamil, 80 mg, Oral, Q12H  vilazodone, 40 mg, Oral, Daily With Breakfast      Continuous IV Infusions:  heparin (porcine), 3-20 Units/kg/hr (Order-Specific), Intravenous, Titrated      PRN Meds:  acetaminophen, 975 mg, Oral, Q6H PRN  aluminum-magnesium hydroxide-simethicone, 30 mL, Oral, Q6H PRN  methocarbamol, 750 mg, Oral, Q6H PRN  morphine injection, 2 mg, Intravenous, Q4H PRN  ondansetron, 4 mg, Intravenous, Q6H PRN  oxyCODONE, 5 mg, Oral, Q4H PRN  simethicone, 80 mg, Oral, 4x Daily PRN        Discharge Plan: D    Network Utilization Review Department  ATTENTION: Please call with any questions or concerns to [phone] and carefully listen to the prompts so that you are directed to the right person  All voicemails are confidential   Ami Skipper all requests for admission clinical reviews, approved or denied determinations and any other requests to dedicated fax number below belonging to the campus where the patient is receiving treatment   List of dedicated fax numbers for the Facilities:  [address] DENIALS (Administrative/Medical Necessity) [phone]   [address] (Maternity/NICU/Pediatrics) [phone]   [address]  [address] [place] [address] [place] [address] [address] [place] [address] [phone]

## 2021-12-25 NOTE — PROGRESS NOTES
Progress Note - Cardiology   [facility] 60 y o  male MRN: [id number]  Unit/Bed#: E4 -01 Encounter: [id number]    Assessment:    · Non ST-elevation myocardial infarction  · Stent of the LAD 2009 for a STEMI  · Hyperlipidemia  · Former smoker  · Primary hypertension  · History of anxiety and depression, not on a beta-blocker because of at exacerbating depression    Plan:    · Schedule cardiac catheterization for Monday        Interval history:  No further chest pain or shortness of breath  On IV heparin      PROBLEM LIST:    Patient Active Problem List    Diagnosis Date Noted    Episodic cluster headache, not intractable 12/24/2021    Chronic pain syndrome 12/24/2021    Adenopathy 11/15/2018    Mixed hyperlipidemia 02/20/2018    Chest pain with high risk for cardiac etiology 01/29/2016    Primary hypertension     Old myocardial infarct     Anxiety with depression     Coronary artery disease involving native coronary artery of native heart with angina pectoris (HCC)     Anxiety        Vitals: /81 (BP Location: Left arm)   Pulse 70   Temp 97 8 °F (36 6 °C) (Temporal)   Resp 18   Ht 5' 10" (1 778 m)   Wt 74 4 kg (164 lb)   SpO2 96%   BMI 23 53 kg/m²   PREVIOUS WEIGHTS:   Weight (last 2 days)     Date/Time Weight    12/23/21 2349 74 4 (164)          Wt Readings from Last 2 Encounters:   12/23/21 74 4 kg (164 lb)   10/05/21 70 9 kg (156 lb 3 2 oz)       Labs/Data:        Results from last 7 days   Lab Units 12/24/21  0413 12/23/21  2354   WBC Thousand/uL 7 64 8 12   HEMOGLOBIN g/dL 14 3 14 7   HEMATOCRIT % 41 0 42 8   MCV fL 91 89   MCH pg 31 8 30 5   MCHC g/dL 34 9 34 3   PLATELETS Thousands/uL 183 165     Results from last 7 days   Lab Units 12/24/21  0413 12/23/21  2354   EGFR ml/min/1 73sq m 94 105   SODIUM mmol/L 139 138   POTASSIUM mmol/L 4 4 5 0   CHLORIDE mmol/L 105 106   CO2 mmol/L 31 27   BUN mg/dL 15 12   CREATININE mg/dL 0 89 0 67     Results from last 7 days   Lab Units 12/24/21  0413 12/23/21  2354   CALCIUM mg/dL 8 9 8 6         No results found for: NTBNP  Lab Results   Component Value Date    CHOLESTEROL 109 01/30/2016    TRIG 146 01/30/2016    HDL 28 (L) 01/30/2016    LDLCALC 52 01/30/2016    LDLDIRECT 107 08/07/2014    HGBA1C 5 5 09/30/2021     No results found for: TSH  No results found for: DIGOXIN  Results from last 7 days   Lab Units 12/24/21  1736   INR  1 14   PROTIME seconds 14 4          Current Facility-Administered Medications:     acetaminophen (TYLENOL) tablet 975 mg, 975 mg, Oral, Q6H PRN, Jorge Saupe, CRNP, 975 mg at 12/24/21 1518    aluminum-magnesium hydroxide-simethicone (MYLANTA) oral suspension 30 mL, 30 mL, Oral, Q6H PRN, Luisito De Leone, CRNP    aspirin (ECOTRIN LOW STRENGTH) EC tablet 81 mg, 81 mg, Oral, Daily, Luisito De Leone CRNP, 81 mg at 12/25/21 0920    atorvastatin (LIPITOR) tablet 20 mg, 20 mg, Oral, Daily With Dinner, Luisito Cortez, CRNP, 20 mg at 12/24/21 1539    clonazePAM (KlonoPIN) tablet 0 5 mg, 0 5 mg, Oral, BID, Luisito Cortez, CRNP, 0 5 mg at 12/25/21 0920    clopidogrel (PLAVIX) tablet 75 mg, 75 mg, Oral, Daily, Ashley Truong MD, 75 mg at 12/25/21 0920    ezetimibe (ZETIA) tablet 10 mg, 10 mg, Oral, Daily, Jorge Haleye CRNP, 10 mg at 12/25/21 0920    gabapentin (NEURONTIN) capsule 600 mg, 600 mg, Oral, TID, Luisito Cortez, CRNP, 600 mg at 12/25/21 0920    heparin (porcine) 25,000 units in 0 45% NaCl 250 mL infusion (premix), 3-20 Units/kg/hr (Order-Specific), Intravenous, Titrated, Livia Tadeo MD, Last Rate: 9 1 mL/hr at 12/25/21 0951, 13 Units/kg/hr at 12/25/21 0951    lisinopril (ZESTRIL) tablet 10 mg, 10 mg, Oral, Daily, Livia Tadeo MD, 10 mg at 12/25/21 0920    methocarbamol (ROBAXIN) tablet 750 mg, 750 mg, Oral, Q6H PRN, JOHNNIE Salazar    mirtazapine (REMERON) tablet 45 mg, 45 mg, Oral, HS, JOHNNIE Salazar, 45 mg at 12/24/21 2154    morphine injection 2 mg, 2 mg, Intravenous, Q4H PRN, [place], CRNP    ondansetron [facility]) injection 4 mg, 4 mg, Intravenous, Q6H PRN, [place], CRNP    oxyCODONE (ROXICODONE) IR tablet 5 mg, 5 mg, Oral, Q4H PRN, [place], CRNP    pantoprazole (PROTONIX) EC tablet 20 mg, 20 mg, Oral, Early Morning, [place], CRNP, 20 mg at 12/25/21 0545    QUEtiapine (SEROquel) tablet 100 mg, 100 mg, Oral, HS, [place], CRNP, 100 mg at 12/24/21 2153    rOPINIRole (REQUIP) tablet 1 mg, 1 mg, Oral, HS, [place], CRNP, 1 mg at 12/24/21 2154    simethicone (MYLICON) chewable tablet 80 mg, 80 mg, Oral, 4x Daily PRN, [place], CRNP    verapamil (CALAN) tablet 80 mg, 80 mg, Oral, Q12H, [place], CRNP, 80 mg at 12/25/21 0357    vilazodone (VIIBRYD) tablet 40 mg, 40 mg, Oral, Daily With Breakfast, [place], CRNP, 40 mg at 12/25/21 0920  No Known Allergies    No intake or output data in the 24 hours ending 12/25/21 1245    Invasive Devices  Report    Peripheral Intravenous Line            Peripheral IV 12/25/21 Right;Ventral (anterior) Forearm <1 day                Review of Systems   Constitutional: Negative for activity change  Respiratory: Negative for cough, chest tightness, shortness of breath and wheezing  Cardiovascular: Negative for chest pain, palpitations and leg swelling  Musculoskeletal: Negative for gait problem  Skin: Negative for color change  Neurological: Negative for dizziness, tremors, syncope, weakness, light-headedness and headaches  Psychiatric/Behavioral: Negative for agitation and confusion  Physical Exam  Vitals reviewed  Constitutional:       General: He is not in acute distress  Appearance: He is well-developed  HENT:      Head: Normocephalic and atraumatic  Neck:      Thyroid: No thyromegaly  Vascular: No carotid bruit or JVD  Trachea: No tracheal deviation     Cardiovascular:      Rate and Rhythm: Normal rate and regular rhythm  Pulses: Normal pulses  Heart sounds: Normal heart sounds  No murmur heard  No friction rub  No gallop  Pulmonary:      Effort: Pulmonary effort is normal  No respiratory distress  Breath sounds: Normal breath sounds  No wheezing, rhonchi or rales  Chest:      Chest wall: No tenderness  Musculoskeletal:      Cervical back: Normal range of motion and neck supple  Right lower leg: No edema  Left lower leg: No edema  Skin:     General: Skin is warm and dry  Neurological:      General: No focal deficit present  Mental Status: He is alert and oriented to person, place, and time  Psychiatric:         Mood and Affect: Mood normal          Behavior: Behavior normal          Thought Content: Thought content normal          Judgment: Judgment normal          ======================================================     Imaging:   I have personally reviewed pertinent reports

## 2021-12-26 LAB
APTT PPP: 64 SECONDS (ref 23–37)
APTT PPP: 71 SECONDS (ref 23–37)

## 2021-12-26 PROCEDURE — 99232 SBSQ HOSP IP/OBS MODERATE 35: CPT | Performed by: PHYSICIAN ASSISTANT

## 2021-12-26 PROCEDURE — 85730 THROMBOPLASTIN TIME PARTIAL: CPT | Performed by: INTERNAL MEDICINE

## 2021-12-26 PROCEDURE — 99233 SBSQ HOSP IP/OBS HIGH 50: CPT | Performed by: INTERNAL MEDICINE

## 2021-12-26 RX ORDER — SODIUM CHLORIDE 9 MG/ML
125 INJECTION, SOLUTION INTRAVENOUS CONTINUOUS
Status: DISCONTINUED | OUTPATIENT
Start: 2021-12-26 | End: 2021-12-28 | Stop reason: HOSPADM

## 2021-12-26 RX ADMIN — EZETIMIBE 10 MG: 10 TABLET ORAL at 08:25

## 2021-12-26 RX ADMIN — HEPARIN SODIUM 15 UNITS/KG/HR: 10000 INJECTION, SOLUTION INTRAVENOUS at 21:40

## 2021-12-26 RX ADMIN — MIRTAZAPINE 45 MG: 15 TABLET, FILM COATED ORAL at 22:30

## 2021-12-26 RX ADMIN — QUETIAPINE FUMARATE 100 MG: 100 TABLET ORAL at 22:30

## 2021-12-26 RX ADMIN — ASPIRIN 81 MG: 81 TABLET, COATED ORAL at 08:25

## 2021-12-26 RX ADMIN — GABAPENTIN 600 MG: 300 CAPSULE ORAL at 17:10

## 2021-12-26 RX ADMIN — CLONAZEPAM 0.5 MG: 0.5 TABLET ORAL at 17:09

## 2021-12-26 RX ADMIN — GABAPENTIN 600 MG: 300 CAPSULE ORAL at 08:26

## 2021-12-26 RX ADMIN — PANTOPRAZOLE SODIUM 20 MG: 20 TABLET, DELAYED RELEASE ORAL at 06:46

## 2021-12-26 RX ADMIN — ROPINIROLE 1 MG: 0.25 TABLET, FILM COATED ORAL at 22:30

## 2021-12-26 RX ADMIN — CLOPIDOGREL BISULFATE 75 MG: 75 TABLET ORAL at 08:25

## 2021-12-26 RX ADMIN — VILAZODONE HYDROCHLORIDE 40 MG: 40 TABLET ORAL at 08:26

## 2021-12-26 RX ADMIN — CLONAZEPAM 0.5 MG: 0.5 TABLET ORAL at 08:25

## 2021-12-26 RX ADMIN — GABAPENTIN 600 MG: 300 CAPSULE ORAL at 22:30

## 2021-12-26 RX ADMIN — LISINOPRIL 10 MG: 10 TABLET ORAL at 08:26

## 2021-12-26 RX ADMIN — ACETAMINOPHEN 975 MG: 325 TABLET, FILM COATED ORAL at 17:09

## 2021-12-26 RX ADMIN — VERAPAMIL HYDROCHLORIDE 80 MG: 80 TABLET ORAL at 06:46

## 2021-12-26 RX ADMIN — ATORVASTATIN CALCIUM 20 MG: 10 TABLET, FILM COATED ORAL at 17:09

## 2021-12-26 RX ADMIN — VERAPAMIL HYDROCHLORIDE 80 MG: 80 TABLET ORAL at 17:10

## 2021-12-26 NOTE — ASSESSMENT & PLAN NOTE
Continue Vilazodone, mirtazapine, Seroquel and clonazepam     · PDMP reviewed, patient on clonazepam 0 5mg b i d  P r aroldo Baires

## 2021-12-26 NOTE — ASSESSMENT & PLAN NOTE
In the setting of coronary artery disease status post LAD stenting for STEMI  · Cardiology consulted, to undergo cardiac catheterization on 12/27

## 2021-12-26 NOTE — PROGRESS NOTES
Progress Note - Cardiology   [facility] 60 y o  male MRN: [id number]  Unit/Bed#: E4 -01 Encounter: [id number]    Assessment:    · NSTEMI  · Nonsustained ventricular tachycardia  · Stent of LAD 2009 for a STEMI  · Hyperlipidemia  · Former smoker  · Primary hypertension  · History of anxiety and depression, not on a beta-blocker because of I exacerbating depression    Plan:    · Echocardiogram 1st thing in a m  · Cardiac catheterization tomorrow      Interval history:  Patient had 3 episodes of nonsustained ventricular tachycardia overnight on the monitor  Two episodes were for 6 beats and 1 episode was for 4 beats    Patient denies chest    PROBLEM LIST:    Patient Active Problem List    Diagnosis Date Noted    Episodic cluster headache, not intractable 12/24/2021    Chronic pain syndrome 12/24/2021    Adenopathy 11/15/2018    Mixed hyperlipidemia 02/20/2018    Chest pain with high risk for cardiac etiology 01/29/2016    Primary hypertension     Old myocardial infarct     Anxiety with depression     Coronary artery disease involving native coronary artery of native heart with angina pectoris (HCC)     Anxiety        Vitals: /79 (BP Location: Left arm)   Pulse 70   Temp 98 3 °F (36 8 °C) (Temporal)   Resp 18   Ht 5' 10" (1 778 m)   Wt 74 4 kg (164 lb)   SpO2 96%   BMI 23 53 kg/m²   PREVIOUS WEIGHTS:   Weight (last 2 days)     None          Wt Readings from Last 2 Encounters:   12/23/21 74 4 kg (164 lb)   10/05/21 70 9 kg (156 lb 3 2 oz)       Labs/Data:        Results from last 7 days   Lab Units 12/24/21  0413 12/23/21  2354   WBC Thousand/uL 7 64 8 12   HEMOGLOBIN g/dL 14 3 14 7   HEMATOCRIT % 41 0 42 8   MCV fL 91 89   MCH pg 31 8 30 5   MCHC g/dL 34 9 34 3   PLATELETS Thousands/uL 183 165     Results from last 7 days   Lab Units 12/24/21  0413 12/23/21  2354   EGFR ml/min/1 73sq m 94 105   SODIUM mmol/L 139 138   POTASSIUM mmol/L 4 4 5 0   CHLORIDE mmol/L 105 106   CO2 mmol/L 31 27 BUN mg/dL 15 12   CREATININE mg/dL 0 89 0 67     Results from last 7 days   Lab Units 12/24/21  0413 12/23/21  2354   CALCIUM mg/dL 8 9 8 6         Lab Results   Component Value Date    CHOLESTEROL 109 01/30/2016    TRIG 146 01/30/2016    HDL 28 (L) 01/30/2016    LDLCALC 52 01/30/2016    LDLDIRECT 107 08/07/2014    HGBA1C 5 5 09/30/2021       Results from last 7 days   Lab Units 12/24/21  1736   INR  1 14   PROTIME seconds 14 4          Current Facility-Administered Medications:     acetaminophen (TYLENOL) tablet 975 mg, 975 mg, Oral, Q6H PRN, Melina Ana, CRNP, 975 mg at 12/24/21 1518    aluminum-magnesium hydroxide-simethicone (MYLANTA) oral suspension 30 mL, 30 mL, Oral, Q6H PRN, Melina Ana, CRNP    aspirin (ECOTRIN LOW STRENGTH) EC tablet 81 mg, 81 mg, Oral, Daily, Melina Ana, CRNP, 81 mg at 12/26/21 0825    atorvastatin (LIPITOR) tablet 20 mg, 20 mg, Oral, Daily With Dinner, Melina Ana, CRNP, 20 mg at 12/25/21 1724    clonazePAM (KlonoPIN) tablet 0 5 mg, 0 5 mg, Oral, BID, Melina Ana, CRNP, 0 5 mg at 12/26/21 0825    clopidogrel (PLAVIX) tablet 75 mg, 75 mg, Oral, Daily, Dorota Amos MD, 75 mg at 12/26/21 0825    ezetimibe (ZETIA) tablet 10 mg, 10 mg, Oral, Daily, Melina Ana, CRNP, 10 mg at 12/26/21 0825    gabapentin (NEURONTIN) capsule 600 mg, 600 mg, Oral, TID, Melina Ana, CRNP, 600 mg at 12/26/21 0826    heparin (porcine) 25,000 units in 0 45% NaCl 250 mL infusion (premix), 3-20 Units/kg/hr (Order-Specific), Intravenous, Titrated, Earl Toure MD, Last Rate: 10 5 mL/hr at 12/25/21 1949, 15 Units/kg/hr at 12/25/21 1949    lisinopril (ZESTRIL) tablet 10 mg, 10 mg, Oral, Daily, Earl Toure MD, 10 mg at 12/26/21 4414    methocarbamol (ROBAXIN) tablet 750 mg, 750 mg, Oral, Q6H PRN, Melina Perez CRNP    mirtazapine (REMERON) tablet 45 mg, 45 mg, Oral, HS, JOHNNIE Melvin, 45 mg at 12/25/21 6852    morphine injection 2 mg, 2 mg, Intravenous, Q4H PRN, Jakob Elizabethet, CRNP    ondansetron [facility]) injection 4 mg, 4 mg, Intravenous, Q6H PRN, [place], CRNP    oxyCODONE (ROXICODONE) IR tablet 5 mg, 5 mg, Oral, Q4H PRN, [place], CRNP    pantoprazole (PROTONIX) EC tablet 20 mg, 20 mg, Oral, Early Morning, Jakob [place], CRNP, 20 mg at 12/26/21 0646    QUEtiapine (SEROquel) tablet 100 mg, 100 mg, Oral, HS, [place], CRNP, 100 mg at 12/25/21 2137    rOPINIRole (REQUIP) tablet 1 mg, 1 mg, Oral, HS, [place], CRNP, 1 mg at 12/25/21 2137    simethicone (MYLICON) chewable tablet 80 mg, 80 mg, Oral, 4x Daily PRN, Jakob Elizabethet, CRNP    verapamil (CALAN) tablet 80 mg, 80 mg, Oral, Q12H, [place]ob [place], CRNP, 80 mg at 12/26/21 0646    vilazodone (VIIBRYD) tablet 40 mg, 40 mg, Oral, Daily With Breakfast, [place], CRNP, 40 mg at 12/26/21 8012  No Known Allergies      Intake/Output Summary (Last 24 hours) at 12/26/2021 1200  Last data filed at 12/26/2021 0601  Gross per 24 hour   Intake 874 93 ml   Output --   Net 874 93 ml       Invasive Devices  Report    Peripheral Intravenous Line            Peripheral IV 12/25/21 Right;Ventral (anterior) Forearm 1 day                Review of Systems   Constitutional: Negative for activity change  Respiratory: Negative for cough, chest tightness, shortness of breath and wheezing  Cardiovascular: Negative for chest pain, palpitations and leg swelling  Musculoskeletal: Negative for gait problem  Skin: Negative for color change  Neurological: Negative for dizziness, tremors, syncope, weakness, light-headedness and headaches  Psychiatric/Behavioral: Negative for agitation and confusion  Physical Exam  Vitals reviewed  Constitutional:       General: He is not in acute distress  Appearance: He is well-developed  HENT:      Head: Normocephalic and atraumatic  Neck:      Thyroid: No thyromegaly        Vascular: No carotid bruit or JVD  Trachea: No tracheal deviation  Cardiovascular:      Rate and Rhythm: Normal rate and regular rhythm  Pulses: Normal pulses  Heart sounds: Normal heart sounds  No murmur heard  No friction rub  No gallop  Pulmonary:      Effort: Pulmonary effort is normal  No respiratory distress  Breath sounds: Normal breath sounds  No wheezing, rhonchi or rales  Chest:      Chest wall: No tenderness  Abdominal:      Palpations: Abdomen is soft  Musculoskeletal:      Cervical back: Normal range of motion and neck supple  Right lower leg: No edema  Left lower leg: No edema  Skin:     General: Skin is warm and dry  Neurological:      General: No focal deficit present  Mental Status: He is alert and oriented to person, place, and time  Psychiatric:         Mood and Affect: Mood normal          Behavior: Behavior normal          Thought Content: Thought content normal          Judgment: Judgment normal          ======================================================     Imaging:   I have personally reviewed pertinent reports  EKG:  Normal sinus rhythm  Three episodes of nonsustained ventricular tachycardia overnight  Two for 6 beats and 1 for 4 beats

## 2021-12-26 NOTE — ASSESSMENT & PLAN NOTE
Patient has a history CAD s/p anterior wall MI with stenting left anterior descending artery in 2009  · Maintained on aspirin 81 mg, statin, ACEI and prn NTG  · No beta-blocker due to history of depression  · Followed outpatient by Dr Natan Yarbrough, most recently seen 10/05/21 in the office: At the time it was noted patient does get intermittent sharp chest pain, felt to be atypical, and a six-month stress test was recommended  · Patient presented to the ER with chest discomfort, different from his previous symptoms:  7/10 intensity  Was a pressure located in the left anterior chest radiating to the left shoulder and left neck  He had mild associated shortness of breath  He was given nitroglycerin sublingually and aspirin bolus by EMS  Pain was constant until he arrived in the ER, were nitroglycerin paste was applied  Patient notes his pain then decrease to 2/10, and has since completely resolved  · EKGs on admission revealed T-wave inversion in 1 and aVL, as well as V5 and V6  Q-wave in lead V1-V6,    Prior EKGs from 2018 revealed T-wave inversion in V5-V6, and V2  · Patient's 3rd troponin is significantly elevated to 250  · Continue medical therapy with aspirin 81 mg, statin, heparin drip  ·  Hold beta-blocker due to relative bradycardia a prior history of adverse reaction  · Patient initially requesting to leave the hospital however family convinced to stay  · Patient will undergo cardiac catheterization on 12/27

## 2021-12-26 NOTE — PLAN OF CARE
Problem: Potential for Falls  Goal: Patient will remain free of falls  Description: INTERVENTIONS:  - Educate patient/family on patient safety including physical limitations  - Instruct patient to call for assistance with activity   - Consult OT/PT to assist with strengthening/mobility   - Keep Call bell within reach  - Keep bed low and locked with side rails adjusted as appropriate  - Keep care items and personal belongings within reach  - Initiate and maintain comfort rounds  - Make Fall Risk Sign visible to staff  - Apply yellow socks and bracelet for high fall risk patients  - Consider moving patient to room near nurses station  Outcome: Progressing     Problem: CARDIOVASCULAR - ADULT  Goal: Maintains optimal cardiac output and hemodynamic stability  Description: INTERVENTIONS:  - Monitor I/O, vital signs and rhythm  - Monitor for S/S and trends of decreased cardiac output  - Administer and titrate ordered vasoactive medications to optimize hemodynamic stability  - Assess quality of pulses, skin color and temperature  - Assess for signs of decreased coronary artery perfusion  - Instruct patient to report change in severity of symptoms  Outcome: Progressing  Goal: Absence of cardiac dysrhythmias or at baseline rhythm  Description: INTERVENTIONS:  - Continuous cardiac monitoring, vital signs, obtain 12 lead EKG if ordered  - Administer antiarrhythmic and heart rate control medications as ordered  - Monitor electrolytes and administer replacement therapy as ordered  Outcome: Progressing     Problem: PAIN - ADULT  Goal: Verbalizes/displays adequate comfort level or baseline comfort level  Description: Interventions:  - Encourage patient to monitor pain and request assistance  - Assess pain using appropriate pain scale  - Administer analgesics based on type and severity of pain and evaluate response  - Implement non-pharmacological measures as appropriate and evaluate response  - Consider cultural and social influences on pain and pain management  - Notify physician/advanced practitioner if interventions unsuccessful or patient reports new pain  Outcome: Progressing     Problem: DISCHARGE PLANNING  Goal: Discharge to home or other facility with appropriate resources  Description: INTERVENTIONS:  - Identify barriers to discharge w/patient and caregiver  - Arrange for needed discharge resources and transportation as appropriate  - Identify discharge learning needs (meds, wound care, etc )  - Arrange for interpretive services to assist at discharge as needed  - Refer to Case Management Department for coordinating discharge planning if the patient needs post-hospital services based on physician/advanced practitioner order or complex needs related to functional status, cognitive ability, or social support system  Outcome: Progressing

## 2021-12-26 NOTE — PROGRESS NOTES
[address] [place] [address]  Progress Note Gan Parcel 1965, 60 y o  male MRN: [id number]  Unit/Bed#: E4 -01 Encounter: [id number]  Primary Care Provider: Patriciaann Fleischer, DO   Date and time admitted to hospital: 12/23/2021 11:42 PM    * Coronary artery disease involving native coronary artery of native heart with angina pectoris [facility])  Assessment & Plan  Patient has a history CAD s/p anterior wall MI with stenting left anterior descending artery in 2009  · Maintained on aspirin 81 mg, statin, ACEI and prn NTG  · No beta-blocker due to history of depression  · Followed outpatient by Dr Tory Schroeder, most recently seen 10/05/21 in the office: At the time it was noted patient does get intermittent sharp chest pain, felt to be atypical, and a six-month stress test was recommended  · Patient presented to the ER with chest discomfort, different from his previous symptoms:  7/10 intensity  Was a pressure located in the left anterior chest radiating to the left shoulder and left neck  He had mild associated shortness of breath  He was given nitroglycerin sublingually and aspirin bolus by EMS  Pain was constant until he arrived in the ER, were nitroglycerin paste was applied  Patient notes his pain then decrease to 2/10, and has since completely resolved  · EKGs on admission revealed T-wave inversion in 1 and aVL, as well as V5 and V6  Q-wave in lead V1-V6,    Prior EKGs from 2018 revealed T-wave inversion in V5-V6, and V2  · Patient's 3rd troponin is significantly elevated to 250  · Continue medical therapy with aspirin 81 mg, statin, heparin drip  ·  Hold beta-blocker due to relative bradycardia a prior history of adverse reaction  · Patient initially requesting to leave the hospital however family convinced to stay  · Patient will undergo cardiac catheterization on 12/27    Chronic pain syndrome  Assessment & Plan  · Chronic pain treated with gabapentin 600mg TID, PRN Robaxin and Tramadol 50mg daily prn, continue  Tramadol verified on PDMP  Episodic cluster headache, not intractable  Assessment & Plan  · Continue home verapamil    Mixed hyperlipidemia  Assessment & Plan  · Continue statin and zetia    Chest pain with high risk for cardiac etiology  Assessment & Plan  In the setting of coronary artery disease status post LAD stenting for STEMI  · Cardiology consulted, to undergo cardiac catheterization on     Anxiety with depression  Assessment & Plan  Continue Vilazodone, mirtazapine, Seroquel and clonazepam     · PDMP reviewed, patient on clonazepam 0 5mg b i d  P r n  Primary hypertension  Assessment & Plan  Home medications include lisinopril 20 mg and verapamil 80 mg  · Blood pressure adequately controlled:  Low-normal:  Will decrease lisinopril to 10 mg to avoid hypotension and hypoperfusion        VTE Pharmacologic Prophylaxis: VTE Score: 1 Moderate Risk (Score 3-4) - Pharmacological DVT Prophylaxis Ordered: heparin drip  Patient Centered Rounds: I performed bedside rounds with nursing staff today  Discussions with Specialists or Other Care Team Provider:  None    Education and Discussions with Family / Patient: Patient declined call to   Time Spent for Care: 20 minutes  More than 50% of total time spent on counseling and coordination of care as described above  Current Length of Stay: 2 day(s)  Current Patient Status: Inpatient   Certification Statement: The patient will continue to require additional inpatient hospital stay due to Cardiac catheterization  Discharge Plan: Anticipate discharge in 24-48 hrs to home  Code Status: Level 1 - Full Code    Subjective: The patient states he is feeling well today  Denies any current chest pain, denies any shortness of breath, nausea, vomiting, diarrhea, constipation, diaphoresis, lightheadedness/dizziness      Objective:     Vitals:   Temp (24hrs), Av °F (36 7 °C), Min:97 7 °F (36 5 °C), Max:98 3 °F (36 8 °C)    Temp:  [97 7 °F (36 5 °C)-98 3 °F (36 8 °C)] 98 3 °F (36 8 °C)  HR:  [69-78] 70  Resp:  [16-18] 18  BP: (111-137)/(73-79) 130/79  SpO2:  [94 %-98 %] 96 %  Body mass index is 23 53 kg/m²  Input and Output Summary (last 24 hours): Intake/Output Summary (Last 24 hours) at 12/26/2021 1211  Last data filed at 12/26/2021 0601  Gross per 24 hour   Intake 874 93 ml   Output --   Net 874 93 ml       Physical Exam:   Physical Exam  Constitutional:       General: He is not in acute distress  Appearance: Normal appearance  He is not ill-appearing, toxic-appearing or diaphoretic  Eyes:      General: No scleral icterus  Conjunctiva/sclera: Conjunctivae normal    Cardiovascular:      Rate and Rhythm: Normal rate and regular rhythm  Heart sounds: No murmur heard  No friction rub  No gallop  Pulmonary:      Effort: Pulmonary effort is normal  No respiratory distress  Breath sounds: Normal breath sounds  No stridor  No wheezing, rhonchi or rales  Chest:      Chest wall: No tenderness  Abdominal:      General: Abdomen is flat  Bowel sounds are normal  There is no distension  Palpations: Abdomen is soft  Tenderness: There is no abdominal tenderness  Musculoskeletal:      Cervical back: Normal range of motion  Right lower leg: No edema  Left lower leg: No edema  Skin:     General: Skin is warm and dry  Capillary Refill: Capillary refill takes less than 2 seconds  Coloration: Skin is not jaundiced or pale  Neurological:      General: No focal deficit present  Mental Status: He is alert and oriented to person, place, and time  Mental status is at baseline            Additional Data:     Labs:  Results from last 7 days   Lab Units 12/24/21  0413   WBC Thousand/uL 7 64   HEMOGLOBIN g/dL 14 3   HEMATOCRIT % 41 0   PLATELETS Thousands/uL 183   NEUTROS PCT % 54   LYMPHS PCT % 34   MONOS PCT % 7   EOS PCT % 3     Results from last 7 days   Lab Units 12/24/21  0413   SODIUM mmol/L 139   POTASSIUM mmol/L 4 4   CHLORIDE mmol/L 105   CO2 mmol/L 31   BUN mg/dL 15   CREATININE mg/dL 0 89   ANION GAP mmol/L 3*   CALCIUM mg/dL 8 9   GLUCOSE RANDOM mg/dL 100     Results from last 7 days   Lab Units 12/24/21  1736   INR  1 14                   Lines/Drains:  Invasive Devices  Report    Peripheral Intravenous Line            Peripheral IV 12/25/21 Right;Ventral (anterior) Forearm 1 day                  Telemetry:  Telemetry Orders (From admission, onward)             24 Hour Telemetry Monitoring  Continuous x 24 Hours (Telem)        References:    Telemetry Guidelines   Question:  Reason for 24 Hour Telemetry  Answer:  Patients waiting for PCI/EP Study/CABG                 Telemetry Reviewed: Normal Sinus Rhythm  Indication for Continued Telemetry Use: Acute MI/Unstable Angina/Rule out ACS             Imaging: No pertinent imaging reviewed      Recent Cultures (last 7 days):         Last 24 Hours Medication List:   Current Facility-Administered Medications   Medication Dose Route Frequency Provider Last Rate    acetaminophen  975 mg Oral Q6H PRN Jessica Kemp, JOHNNIE      aluminum-magnesium hydroxide-simethicone  30 mL Oral Q6H PRN Jessica Kemp, JOHNNIE      aspirin  81 mg Oral Daily Jessica Kemp, CRNP      atorvastatin  20 mg Oral Daily With [employer], CRNP      clonazePAM  0 5 mg Oral BID Jessica Kemp, CRNP      clopidogrel  75 mg Oral Daily Analy Red MD      ezetimibe  10 mg Oral Daily Jessica Kemp, CRNP      gabapentin  600 mg Oral TID JOHNNIE Weaver      heparin (porcine)  3-20 Units/kg/hr (Order-Specific) Intravenous Titrated Jonnathan Guillen MD 15 Units/kg/hr (12/25/21 1949)    lisinopril  10 mg Oral Daily Jonnathan Guillen MD      methocarbamol  750 mg Oral Q6H PRN Jessica Kemp, JOHNNIE      mirtazapine  45 mg Oral HS JOHNNIE Weaver      morphine injection  2 mg Intravenous Q4H PRN Meri Kacey, CRNP      ondansetron  4 mg Intravenous Q6H PRN Meri Kacey, CRNP      oxyCODONE  5 mg Oral Q4H PRN Meri Kacey, CRNP      pantoprazole  20 mg Oral Early Morning Meri Kacey, CRNP      QUEtiapine  100 mg Oral HS Meri Kacey, CRNP      rOPINIRole  1 mg Oral HS Meri Kacey, CRNP      simethicone  80 mg Oral 4x Daily PRN Meri Kacey, CRNP      verapamil  80 mg Oral Q12H Meri Kacey, CRNP      vilazodone  40 mg Oral Daily With Breakfast Meri Kacey, LAURANP          Today, Patient Was Seen By: Alok Nye PA-C    **Please Note: This note may have been constructed using a voice recognition system  **

## 2021-12-26 NOTE — ASSESSMENT & PLAN NOTE
Home medications include lisinopril 20 mg and verapamil 80 mg  · Blood pressure adequately controlled:  Low-normal:  Will decrease lisinopril to 10 mg to avoid hypotension and hypoperfusion

## 2021-12-27 ENCOUNTER — APPOINTMENT (INPATIENT)
Dept: NON INVASIVE DIAGNOSTICS | Facility: HOSPITAL | Age: 56
DRG: 246 | End: 2021-12-27
Payer: COMMERCIAL

## 2021-12-27 PROBLEM — I21.4 NSTEMI (NON-ST ELEVATED MYOCARDIAL INFARCTION) (HCC): Status: ACTIVE | Noted: 2021-12-27

## 2021-12-27 LAB
ANION GAP SERPL CALCULATED.3IONS-SCNC: 8 MMOL/L (ref 4–13)
AORTIC ROOT: 3.4 CM
AORTIC VALVE MEAN VELOCITY: 7.4 M/S
APICAL FOUR CHAMBER EJECTION FRACTION: 59 %
APTT PPP: 118 SECONDS (ref 23–37)
APTT PPP: 73 SECONDS (ref 23–37)
AV AREA BY CONTINUOUS VTI: 2.9 CM2
AV AREA PEAK VELOCITY: 3 CM2
AV LVOT MEAN GRADIENT: 1 MMHG
AV LVOT PEAK GRADIENT: 2 MMHG
AV MEAN GRADIENT: 2 MMHG
AV PEAK GRADIENT: 4 MMHG
AV VALVE AREA: 2.89 CM2
BUN SERPL-MCNC: 18 MG/DL (ref 5–25)
CALCIUM SERPL-MCNC: 8.6 MG/DL (ref 8.3–10.1)
CHLORIDE SERPL-SCNC: 107 MMOL/L (ref 100–108)
CHOLEST SERPL-MCNC: 125 MG/DL
CO2 SERPL-SCNC: 28 MMOL/L (ref 21–32)
CREAT SERPL-MCNC: 0.72 MG/DL (ref 0.6–1.3)
DOP CALC AO VTI: 25.25 CM
DOP CALC LVOT AREA: 4.15 CM2
DOP CALC LVOT DIAMETER: 2.3 CM
DOP CALC LVOT PEAK VEL VTI: 17.58 CM
DOP CALC LVOT PEAK VEL: 0.77 M/S
DOP CALC LVOT STROKE INDEX: 39.6 ML/M2
DOP CALC LVOT STROKE VOLUME: 73 CM3
E WAVE DECELERATION TIME: 193 MS
ERYTHROCYTE [DISTWIDTH] IN BLOOD BY AUTOMATED COUNT: 11.9 % (ref 11.6–15.1)
FRACTIONAL SHORTENING: 33 % (ref 28–44)
GFR SERPL CREATININE-BSD FRML MDRD: 102 ML/MIN/1.73SQ M
GLUCOSE SERPL-MCNC: 125 MG/DL (ref 65–140)
GLUCOSE SERPL-MCNC: 96 MG/DL (ref 65–140)
HCT VFR BLD AUTO: 44 % (ref 36.5–49.3)
HDLC SERPL-MCNC: 31 MG/DL
HGB BLD-MCNC: 14.9 G/DL (ref 12–17)
INTERVENTRICULAR SEPTUM IN DIASTOLE (PARASTERNAL SHORT AXIS VIEW): 1.1 CM
KCT BLD-ACNC: 278 SEC (ref 89–137)
LAAS-AP2: 17.5 CM2
LAAS-AP4: 13.9 CM2
LDLC SERPL CALC-MCNC: 63 MG/DL (ref 0–100)
LDLC SERPL DIRECT ASSAY-MCNC: 70 MG/DL (ref 0–100)
LEFT INTERNAL DIMENSION IN SYSTOLE: 3.1 CM (ref 2.1–4)
LEFT VENTRICLE DIASTOLIC VOLUME (MOD BIPLANE): 103 ML
LEFT VENTRICLE SYSTOLIC VOLUME (MOD BIPLANE): 45 ML
LEFT VENTRICULAR INTERNAL DIMENSION IN DIASTOLE: 4.6 CM (ref 4.44–6.61)
LEFT VENTRICULAR POSTERIOR WALL IN END DIASTOLE: 1 CM
LEFT VENTRICULAR STROKE VOLUME: 58 ML
LV EF: 57 %
MCH RBC QN AUTO: 30.7 PG (ref 26.8–34.3)
MCHC RBC AUTO-ENTMCNC: 33.9 G/DL (ref 31.4–37.4)
MCV RBC AUTO: 91 FL (ref 82–98)
MV E'TISSUE VEL-LAT: 7 CM/S
MV E'TISSUE VEL-SEP: 6 CM/S
MV PEAK A VEL: 0.71 M/S
MV PEAK E VEL: 57 CM/S
NONHDLC SERPL-MCNC: 94 MG/DL
PLATELET # BLD AUTO: 171 THOUSANDS/UL (ref 149–390)
PMV BLD AUTO: 11.1 FL (ref 8.9–12.7)
POTASSIUM SERPL-SCNC: 3.9 MMOL/L (ref 3.5–5.3)
RBC # BLD AUTO: 4.85 MILLION/UL (ref 3.88–5.62)
RIGHT ATRIAL 2D VOLUME: 28 ML
RIGHT ATRIUM AREA SYSTOLE A4C: 12.9 CM2
RIGHT VENTRICLE ID DIMENSION: 2.9 CM
SL CV LV EF: 55
SL CV PED ECHO LEFT VENTRICLE DIASTOLIC VOLUME (MOD BIPLANE) 2D: 96 ML
SL CV PED ECHO LEFT VENTRICLE SYSTOLIC VOLUME (MOD BIPLANE) 2D: 38 ML
SODIUM SERPL-SCNC: 143 MMOL/L (ref 136–145)
SPECIMEN SOURCE: ABNORMAL
TRICUSPID VALVE S': 45 CM/S
TRIGL SERPL-MCNC: 155 MG/DL
WBC # BLD AUTO: 6.48 THOUSAND/UL (ref 4.31–10.16)
Z-SCORE OF LEFT VENTRICULAR DIMENSION IN END SYSTOLE: -1.64

## 2021-12-27 PROCEDURE — 83721 ASSAY OF BLOOD LIPOPROTEIN: CPT | Performed by: STUDENT IN AN ORGANIZED HEALTH CARE EDUCATION/TRAINING PROGRAM

## 2021-12-27 PROCEDURE — 99232 SBSQ HOSP IP/OBS MODERATE 35: CPT | Performed by: INTERNAL MEDICINE

## 2021-12-27 PROCEDURE — C1887 CATHETER, GUIDING: HCPCS | Performed by: INTERNAL MEDICINE

## 2021-12-27 PROCEDURE — 80048 BASIC METABOLIC PNL TOTAL CA: CPT | Performed by: PHYSICIAN ASSISTANT

## 2021-12-27 PROCEDURE — C1769 GUIDE WIRE: HCPCS | Performed by: INTERNAL MEDICINE

## 2021-12-27 PROCEDURE — 80061 LIPID PANEL: CPT | Performed by: STUDENT IN AN ORGANIZED HEALTH CARE EDUCATION/TRAINING PROGRAM

## 2021-12-27 PROCEDURE — 93306 TTE W/DOPPLER COMPLETE: CPT | Performed by: INTERNAL MEDICINE

## 2021-12-27 PROCEDURE — 93971 EXTREMITY STUDY: CPT

## 2021-12-27 PROCEDURE — 92928 PRQ TCAT PLMT NTRAC ST 1 LES: CPT | Performed by: INTERNAL MEDICINE

## 2021-12-27 PROCEDURE — C1874 STENT, COATED/COV W/DEL SYS: HCPCS | Performed by: INTERNAL MEDICINE

## 2021-12-27 PROCEDURE — 93880 EXTRACRANIAL BILAT STUDY: CPT | Performed by: SURGERY

## 2021-12-27 PROCEDURE — 85730 THROMBOPLASTIN TIME PARTIAL: CPT | Performed by: INTERNAL MEDICINE

## 2021-12-27 PROCEDURE — 85027 COMPLETE CBC AUTOMATED: CPT | Performed by: PHYSICIAN ASSISTANT

## 2021-12-27 PROCEDURE — 027034Z DILATION OF CORONARY ARTERY, ONE ARTERY WITH DRUG-ELUTING INTRALUMINAL DEVICE, PERCUTANEOUS APPROACH: ICD-10-PCS | Performed by: INTERNAL MEDICINE

## 2021-12-27 PROCEDURE — 93005 ELECTROCARDIOGRAM TRACING: CPT

## 2021-12-27 PROCEDURE — 82948 REAGENT STRIP/BLOOD GLUCOSE: CPT

## 2021-12-27 PROCEDURE — 99233 SBSQ HOSP IP/OBS HIGH 50: CPT | Performed by: INTERNAL MEDICINE

## 2021-12-27 PROCEDURE — C9600 PERC DRUG-EL COR STENT SING: HCPCS | Performed by: INTERNAL MEDICINE

## 2021-12-27 PROCEDURE — 99153 MOD SED SAME PHYS/QHP EA: CPT | Performed by: INTERNAL MEDICINE

## 2021-12-27 PROCEDURE — C1894 INTRO/SHEATH, NON-LASER: HCPCS | Performed by: INTERNAL MEDICINE

## 2021-12-27 PROCEDURE — 93454 CORONARY ARTERY ANGIO S&I: CPT | Performed by: INTERNAL MEDICINE

## 2021-12-27 PROCEDURE — NC001 PR NO CHARGE: Performed by: INTERNAL MEDICINE

## 2021-12-27 PROCEDURE — C1725 CATH, TRANSLUMIN NON-LASER: HCPCS | Performed by: INTERNAL MEDICINE

## 2021-12-27 PROCEDURE — 93880 EXTRACRANIAL BILAT STUDY: CPT

## 2021-12-27 PROCEDURE — B2111ZZ FLUOROSCOPY OF MULTIPLE CORONARY ARTERIES USING LOW OSMOLAR CONTRAST: ICD-10-PCS | Performed by: INTERNAL MEDICINE

## 2021-12-27 PROCEDURE — 85347 COAGULATION TIME ACTIVATED: CPT

## 2021-12-27 PROCEDURE — 99152 MOD SED SAME PHYS/QHP 5/>YRS: CPT | Performed by: INTERNAL MEDICINE

## 2021-12-27 PROCEDURE — 93306 TTE W/DOPPLER COMPLETE: CPT

## 2021-12-27 PROCEDURE — 85730 THROMBOPLASTIN TIME PARTIAL: CPT | Performed by: PHYSICIAN ASSISTANT

## 2021-12-27 DEVICE — STENT RONYX30038UX RESOLUTE ONYX 3.00X38
Type: IMPLANTABLE DEVICE | Site: CORONARY | Status: FUNCTIONAL
Brand: RESOLUTE ONYX™

## 2021-12-27 RX ORDER — CLOPIDOGREL BISULFATE 75 MG/1
75 TABLET ORAL DAILY
Qty: 30 TABLET | Refills: 0 | Status: SHIPPED | OUTPATIENT
Start: 2021-12-28 | End: 2022-01-28 | Stop reason: SDUPTHER

## 2021-12-27 RX ORDER — NITROGLYCERIN 20 MG/100ML
INJECTION INTRAVENOUS AS NEEDED
Status: DISCONTINUED | OUTPATIENT
Start: 2021-12-27 | End: 2021-12-27 | Stop reason: HOSPADM

## 2021-12-27 RX ORDER — FENTANYL CITRATE 50 UG/ML
INJECTION, SOLUTION INTRAMUSCULAR; INTRAVENOUS AS NEEDED
Status: DISCONTINUED | OUTPATIENT
Start: 2021-12-27 | End: 2021-12-27 | Stop reason: HOSPADM

## 2021-12-27 RX ORDER — SODIUM CHLORIDE 9 MG/ML
150 INJECTION, SOLUTION INTRAVENOUS CONTINUOUS
Status: DISPENSED | OUTPATIENT
Start: 2021-12-27 | End: 2021-12-27

## 2021-12-27 RX ORDER — LIDOCAINE HYDROCHLORIDE 10 MG/ML
INJECTION, SOLUTION EPIDURAL; INFILTRATION; INTRACAUDAL; PERINEURAL AS NEEDED
Status: DISCONTINUED | OUTPATIENT
Start: 2021-12-27 | End: 2021-12-27 | Stop reason: HOSPADM

## 2021-12-27 RX ORDER — VERAPAMIL HCL 2.5 MG/ML
AMPUL (ML) INTRAVENOUS AS NEEDED
Status: DISCONTINUED | OUTPATIENT
Start: 2021-12-27 | End: 2021-12-27 | Stop reason: HOSPADM

## 2021-12-27 RX ORDER — MIDAZOLAM HYDROCHLORIDE 2 MG/2ML
INJECTION, SOLUTION INTRAMUSCULAR; INTRAVENOUS AS NEEDED
Status: DISCONTINUED | OUTPATIENT
Start: 2021-12-27 | End: 2021-12-27 | Stop reason: HOSPADM

## 2021-12-27 RX ORDER — HEPARIN SODIUM 1000 [USP'U]/ML
INJECTION, SOLUTION INTRAVENOUS; SUBCUTANEOUS AS NEEDED
Status: DISCONTINUED | OUTPATIENT
Start: 2021-12-27 | End: 2021-12-27 | Stop reason: HOSPADM

## 2021-12-27 RX ADMIN — GABAPENTIN 600 MG: 300 CAPSULE ORAL at 17:05

## 2021-12-27 RX ADMIN — ATORVASTATIN CALCIUM 20 MG: 10 TABLET, FILM COATED ORAL at 17:05

## 2021-12-27 RX ADMIN — SODIUM CHLORIDE 125 ML/HR: 0.9 INJECTION, SOLUTION INTRAVENOUS at 06:08

## 2021-12-27 RX ADMIN — SODIUM CHLORIDE 150 ML/HR: 0.9 INJECTION, SOLUTION INTRAVENOUS at 10:19

## 2021-12-27 RX ADMIN — ROPINIROLE 1 MG: 0.25 TABLET, FILM COATED ORAL at 23:09

## 2021-12-27 RX ADMIN — MIRTAZAPINE 45 MG: 15 TABLET, FILM COATED ORAL at 23:09

## 2021-12-27 RX ADMIN — PANTOPRAZOLE SODIUM 20 MG: 20 TABLET, DELAYED RELEASE ORAL at 06:08

## 2021-12-27 RX ADMIN — CLONAZEPAM 0.5 MG: 0.5 TABLET ORAL at 08:21

## 2021-12-27 RX ADMIN — ASPIRIN 81 MG: 81 TABLET, COATED ORAL at 08:21

## 2021-12-27 RX ADMIN — VERAPAMIL HYDROCHLORIDE 80 MG: 80 TABLET ORAL at 06:07

## 2021-12-27 RX ADMIN — GABAPENTIN 600 MG: 300 CAPSULE ORAL at 20:14

## 2021-12-27 RX ADMIN — GABAPENTIN 600 MG: 300 CAPSULE ORAL at 08:21

## 2021-12-27 RX ADMIN — CLONAZEPAM 0.5 MG: 0.5 TABLET ORAL at 17:05

## 2021-12-27 RX ADMIN — VERAPAMIL HYDROCHLORIDE 80 MG: 80 TABLET ORAL at 17:05

## 2021-12-27 RX ADMIN — CLOPIDOGREL BISULFATE 75 MG: 75 TABLET ORAL at 08:21

## 2021-12-27 RX ADMIN — VILAZODONE HYDROCHLORIDE 40 MG: 40 TABLET ORAL at 08:22

## 2021-12-27 RX ADMIN — MORPHINE SULFATE 2 MG: 2 INJECTION, SOLUTION INTRAMUSCULAR; INTRAVENOUS at 17:05

## 2021-12-27 RX ADMIN — QUETIAPINE FUMARATE 100 MG: 100 TABLET ORAL at 23:10

## 2021-12-27 RX ADMIN — LISINOPRIL 10 MG: 10 TABLET ORAL at 08:22

## 2021-12-27 RX ADMIN — EZETIMIBE 10 MG: 10 TABLET ORAL at 08:21

## 2021-12-27 NOTE — ASSESSMENT & PLAN NOTE
Patient has a history CAD s/p anterior wall MI with stenting left anterior descending artery in 2009  · Maintained on aspirin 81 mg, statin, ACEI and prn NTG  · No beta-blocker due to history of depression  · Followed outpatient by Dr Yany Clayton, most recently seen 10/05/21 in the office:   At the time it was noted patient does get intermittent sharp chest pain, felt to be atypical, and a six-month stress test was recommended

## 2021-12-27 NOTE — PROGRESS NOTES
Cardiology Progress Note   MD Humaira Cameron MD Dane Fresh, DO, Remigio Rector Mansoor, MD Minna Katayama, DO, Zee Chapa DO, [facility]  ----------------------------------------------------------------  [address] Host 60 y o  male MRN: [id number]  Unit/Bed#: E4 -01 Encounter: [id number]      ASSESSMENT:    Elevated troponin likely secondary to non ST-elevation MI type 1   CAD/STEMI w/ PCI-LAD, 2009   Nonsustained VT   Hypertension   Dyslipidemia   History of tobacco use   Depression   Anxiety   Beta-blocker intolerance due to exacerbation of depression    PLAN:  Patient is without any further chest discomfort  He underwent cardiac catheterization with stent placement to the proximal LAD  On visualization of the ostial LAD, the patient had 95% ostial lesion that was not amenable to PCI following placement of the stent  Interventional Cardiology recommended CT surgical evaluation for early mid LAD  Patient be transferred to [facility]  Continue aspirin, Plavix, ACE-inhibitor, statin, Zetia and verapamil  Discussed case with Cardiology and CT surgery  Echocardiogram pending to assess cardiac structure and function  Will check venous mapping and carotid Doppler wall patient is pending transfer    Signed: Marecl Lugo DO, [facility], [facility]      History of Present Illness:  Patient seen and examined  Denies chest pain, pressure, tightness or squeezing  Denies lightheadedness, dizziness or palpitations  Denies lower extremity swelling, orthopnea paroxysmal nocturnal dyspnea  Review of Systems:  Review of Systems   Constitutional: Negative for decreased appetite, fever, weight gain and weight loss  HENT: Negative for congestion and sore throat  Eyes: Negative for visual disturbance  Cardiovascular: Negative for chest pain, dyspnea on exertion, leg swelling, near-syncope and palpitations  Respiratory: Negative for cough and shortness of breath  Hematologic/Lymphatic: Negative for bleeding problem  Skin: Negative for rash  Musculoskeletal: Negative for myalgias and neck pain  Gastrointestinal: Negative for abdominal pain and nausea  Neurological: Negative for light-headedness and weakness  Psychiatric/Behavioral: Negative for depression  No Known Allergies    No current facility-administered medications on file prior to encounter  Current Outpatient Medications on File Prior to Encounter   Medication Sig    acetaminophen (TYLENOL) 500 mg tablet Take 500 mg by mouth every 12 (twelve) hours as needed for mild pain    aspirin 81 MG tablet Take 81 mg by mouth daily   atorvastatin (LIPITOR) 40 mg tablet Take 0 5 tablets (20 mg total) by mouth daily    clonazePAM (KlonoPIN) 1 mg tablet TK 1/2 T PO BID PRN    ezetimibe (ZETIA) 10 mg tablet TAKE 1 TABLET(10 MG) BY MOUTH DAILY    gabapentin (NEURONTIN) 600 MG tablet Take 600 mg by mouth 3 (three) times a day   ibuprofen (MOTRIN) 600 mg tablet Take 1 tablet (600 mg total) by mouth every 8 (eight) hours as needed for mild pain for up to 10 days (Patient not taking: Reported on 10/5/2021)    lisinopril (ZESTRIL) 40 mg tablet Take 0 5 tablets (20 mg total) by mouth daily    methocarbamol (ROBAXIN) 750 mg tablet Take 750 mg by mouth 3 (three) times a day   mirtazapine (REMERON) 45 MG tablet Take 45 mg by mouth daily at bedtime   nitroglycerin (NITROSTAT) 0 4 mg SL tablet PLACE 1 TABLET UNDER THE TONGUE EVERY 5 MINS AS NEEDED FOR CHEST PAIN    omeprazole (PriLOSEC) 20 mg delayed release capsule Take 20 mg by mouth 2 (two) times a day   QUEtiapine (SEROquel) 100 mg tablet Take 100 mg by mouth daily at bedtime   rOPINIRole (REQUIP) 1 mg tablet Take 1 mg by mouth daily   traMADol (ULTRAM) 50 mg tablet Take 50 mg by mouth daily      triamcinolone (KENALOG) 0 1 % cream Apply topically 2 (two) times a day      verapamil (CALAN) 80 mg tablet Take 1 tablet (80 mg total) by mouth every 12 (twelve) hours    VIIBRYD 40 MG tablet Take 40 mg by mouth daily with breakfast          Current Facility-Administered Medications   Medication Dose Route Frequency Provider Last Rate    acetaminophen  975 mg Oral Q6H PRN Madison Virgil, CRNP      aluminum-magnesium hydroxide-simethicone  30 mL Oral Q6H PRN Madison Virgil, CRNP      aspirin  81 mg Oral Daily Madison Virgil, CRNP      atorvastatin  20 mg Oral Daily With [employer], CRNP      clonazePAM  0 5 mg Oral BID Madison Virgil, CRNP      clopidogrel  75 mg Oral Daily Pat Deleon MD      ezetimibe  10 mg Oral Daily Madison Virgil, CRNP      gabapentin  600 mg Oral TID Madison Virgil, CRNP      heparin (porcine)  3-20 Units/kg/hr (Order-Specific) Intravenous Titrated Bro Finn MD 15 Units/kg/hr (12/26/21 2140)    lisinopril  10 mg Oral Daily Bro Finn MD      methocarbamol  750 mg Oral Q6H PRN Madison Virgil, CRNP      mirtazapine  45 mg Oral HS Madison Virgil, CRNP      morphine injection  2 mg Intravenous Q4H PRN Madison Virgil, CRNP      ondansetron  4 mg Intravenous Q6H PRN Madison Virgil, CRNP      oxyCODONE  5 mg Oral Q4H PRN Madison Virgil, CRNP      pantoprazole  20 mg Oral Early Morning Madison Virgil, CRNP      QUEtiapine  100 mg Oral HS Madison Virgil, CRNP      rOPINIRole  1 mg Oral HS Madison Virgil, CRNP      simethicone  80 mg Oral 4x Daily PRN Madison Virgil, CRNP      sodium chloride  125 mL/hr Intravenous Continuous Pat Deleon  mL/hr (12/27/21 5573)    verapamil  80 mg Oral Q12H Madison Virgil, CRNP      vilazodone  40 mg Oral Daily With Breakfast Madison Virgil, CRNP         heparin (porcine), 3-20 Units/kg/hr (Order-Specific), Last Rate: 15 Units/kg/hr (12/26/21 2140)  sodium chloride, 125 mL/hr, Last Rate: 125 mL/hr (12/27/21 8067)        Vitals:    12/26/21 1900 12/26/21 2300 12/27/21 0607 12/27/21 0700   BP: 151/77 142/83 155/86 155/76   BP Location: Left arm  Left arm Left arm   Pulse: 75 73 64 61   Resp: 18   18   Temp: 98 5 °F (36 9 °C) (!) 96 9 °F (36 1 °C)  97 6 °F (36 4 °C)   TempSrc: Oral Temporal  Temporal   SpO2: 96% 96%  96%   Weight:       Height:             Intake/Output Summary (Last 24 hours) at 12/27/2021 8356  Last data filed at 12/27/2021 0600  Gross per 24 hour   Intake 605 83 ml   Output --   Net 605 83 ml       Weight change:     PHYSICAL EXAMINATION:  Gen: Awake, Alert, NAD  Head/eyes: AT/NC, pupils equal and round, Anicteric  ENT: mmm  Neck: Supple, No elevated JVP, trachea midline  Resp: CTA bilaterally no w/r/r  CV: RRR +S1, S2, No m/r/g  Abd: Soft, NT/ND + BS  Ext: no LE edema bilaterally  Neuro: Follows commands, moves all extermities  Psych: Appropriate affect, normal mood, pleasant attitude, non-combative  Skin: warm; no rash, erythema or venous stasis changes on exposed skin    Lab Results:  Results from last 7 days   Lab Units 12/27/21  0641   WBC Thousand/uL 6 48   HEMOGLOBIN g/dL 14 9   HEMATOCRIT % 44 0   PLATELETS Thousands/uL 171     Results from last 7 days   Lab Units 12/24/21  0413   POTASSIUM mmol/L 4 4   CHLORIDE mmol/L 105   CO2 mmol/L 31   BUN mg/dL 15   CREATININE mg/dL 0 89   CALCIUM mg/dL 8 9     No results found for: TROPONINT          Results from last 7 days   Lab Units 12/24/21  1736   INR  1 14       Tele: SB to SR    This note was completed in part utilizing M-Modal Fluency Direct Software  Grammatical errors, random word insertions, spelling mistakes, and incomplete sentences may be an occasional consequence of this system secondary to software limitations, ambient noise, and hardware issues  If you have any questions or concerns about the content, text, or information contained within the body of this dictation, please contact the provider for clarification

## 2021-12-27 NOTE — PROGRESS NOTES
Successful transport to floor without incident on cardiac monitor  VSS  Right wrist stable with TR Band intact with 12ml air  Pt received by Cassie Mares

## 2021-12-27 NOTE — PROGRESS NOTES
[address] [place] [address]  Progress Note Richard Saldivar 1965, 60 y o  male MRN: [id number]  Unit/Bed#: E4 -01 Encounter: [id number]  Primary Care Provider: Alan Lobo DO   Date and time admitted to hospital: 12/23/2021 11:42 PM    * NSTEMI (non-ST elevated myocardial infarction) [facility])  Assessment & Plan  Patient presented to the ER with chest discomfort, different from his previous symptoms:  7/10 intensity  Was a pressure located in the left anterior chest radiating to the left shoulder and left neck  He had mild associated shortness of breath  He was given nitroglycerin sublingually and aspirin bolus by EMS  Pain was constant until he arrived in the ER, were nitroglycerin paste was applied  Patient notes his pain then decrease to 2/10, and has since completely resolved  · EKGs on admission revealed T-wave inversion in 1 and aVL, as well as V5 and V6  Q-wave in lead V1-V6,  Prior EKGs from 2018 revealed T-wave inversion in V5-V6, and V2  · Patient's 3rd troponin is significantly elevated to 250  · Continue medical therapy:  · Aspirin 81 mg  · Lipitor 40 mg  · Plavix 75 mg  · Zetia  · Verapamil  · Not on beta-blocker due to relative bradycardia a prior history of adverse reaction  · Patient initially requesting to leave the hospital however family convinced to stay  · Underwent cardiac catheterization on 12/27 revealing 95% stent restenoses in the proximal LAD  PCI performed with the placement of KHANH  On visualization of the ostial LAD, he now has a face 95% ostial lesion not amenable to PCI  · Will need transfer to [facility] for cardiothoracic surgery consultation for potential LIMA to LAD  · Patient accepted, awaiting bed    Coronary artery disease involving native coronary artery of native heart with angina pectoris [facility])  Assessment & Plan  Patient has a history CAD s/p anterior wall MI with stenting left anterior descending artery in 2009     · Maintained on aspirin 81 mg, statin, ACEI and prn NTG  · No beta-blocker due to history of depression  · Followed outpatient by Dr Clarke Palacios, most recently seen 10/05/21 in the office: At the time it was noted patient does get intermittent sharp chest pain, felt to be atypical, and a six-month stress test was recommended    Mixed hyperlipidemia  Assessment & Plan  · Continue statin and zetia    Chest pain with high risk for cardiac etiology  Assessment & Plan  In the setting of coronary artery disease status post LAD stenting for STEMI  · Cardiology consulted, to undergo cardiac catheterization on 12/27    Anxiety with depression  Assessment & Plan  Continue Vilazodone, mirtazapine, Seroquel and clonazepam     · PDMP reviewed, patient on clonazepam 0 5mg b i d  P r n  Primary hypertension  Assessment & Plan  Home medications include lisinopril 20 mg and verapamil 80 mg  · Blood pressure adequately controlled:  Low-normal:  Will decrease lisinopril to 10 mg to avoid hypotension and hypoperfusion      VTE Pharmacologic Prophylaxis: VTE Score: 1 Moderate Risk (Score 3-4) - Pharmacological DVT Prophylaxis Ordered: heparin drip  Patient Centered Rounds: I performed bedside rounds with nursing staff today  Discussions with Specialists or Other Care Team Provider:  Cardiology    Education and Discussions with Family / Patient: Updated  (son and daughter) at bedside  Time Spent for Care: 15 minutes  More than 50% of total time spent on counseling and coordination of care as described above  Current Length of Stay: 3 day(s)  Current Patient Status: Inpatient   Certification Statement: The patient will continue to require additional inpatient hospital stay due to Transfer to [facility]  Discharge Plan: Anticipate discharge tomorrow to [facility]    Code Status: Level 1 - Full Code    Subjective: The patient states that he is very frustrated today    Initially, he was refusing to be transferred to [facility] for [facility] surgery evaluation however after discussion with his daughter and his son he is now agreeable to transfer  He denies any chest pain, shortness of breath, nausea, vomiting, diarrhea, constipation  Objective:     Vitals:   Temp (24hrs), Av 8 °F (36 6 °C), Min:96 9 °F (36 1 °C), Max:98 5 °F (36 9 °C)    Temp:  [96 9 °F (36 1 °C)-98 5 °F (36 9 °C)] 97 6 °F (36 4 °C)  HR:  [58-75] 61  Resp:  [18] 18  BP: (116-155)/(72-89) 133/89  SpO2:  [95 %-99 %] 97 %  Body mass index is 23 53 kg/m²  Input and Output Summary (last 24 hours): Intake/Output Summary (Last 24 hours) at 2021 1356  Last data filed at 2021 1018  Gross per 24 hour   Intake 1126 66 ml   Output 0 ml   Net 1126 66 ml       Physical Exam:   Physical Exam  Constitutional:       General: He is not in acute distress  Appearance: Normal appearance  He is not ill-appearing, toxic-appearing or diaphoretic  Eyes:      General: No scleral icterus  Conjunctiva/sclera: Conjunctivae normal    Cardiovascular:      Rate and Rhythm: Normal rate and regular rhythm  Heart sounds: No murmur heard  No friction rub  No gallop  Comments: Right radial band in place  Pulmonary:      Effort: Pulmonary effort is normal  No respiratory distress  Breath sounds: Normal breath sounds  No stridor  No wheezing, rhonchi or rales  Chest:      Chest wall: No tenderness  Abdominal:      General: Abdomen is flat  Bowel sounds are normal  There is no distension  Palpations: Abdomen is soft  Tenderness: There is no abdominal tenderness  Musculoskeletal:      Cervical back: Normal range of motion  Right lower leg: No edema  Left lower leg: No edema  Skin:     General: Skin is warm and dry  Capillary Refill: Capillary refill takes less than 2 seconds  Coloration: Skin is not jaundiced or pale  Neurological:      General: No focal deficit present        Mental Status: He is alert and oriented to person, place, and time  Mental status is at baseline  Additional Data:     Labs:  Results from last 7 days   Lab Units 21  0641 21  0413 21  0413   WBC Thousand/uL 6 48   < > 7 64   HEMOGLOBIN g/dL 14 9   < > 14 3   HEMATOCRIT % 44 0   < > 41 0   PLATELETS Thousands/uL 171   < > 183   NEUTROS PCT %  --   --  54   LYMPHS PCT %  --   --  34   MONOS PCT %  --   --  7   EOS PCT %  --   --  3    < > = values in this interval not displayed  Results from last 7 days   Lab Units 21  0641   SODIUM mmol/L 143   POTASSIUM mmol/L 3 9   CHLORIDE mmol/L 107   CO2 mmol/L 28   BUN mg/dL 18   CREATININE mg/dL 0 72   ANION GAP mmol/L 8   CALCIUM mg/dL 8 6   GLUCOSE RANDOM mg/dL 96     Results from last 7 days   Lab Units 21  1736   INR  1 14                   Lines/Drains:  Invasive Devices  Report    Peripheral Intravenous Line            Peripheral IV 21 Right;Ventral (anterior) Forearm 2 days                  Telemetry:  Telemetry Orders (From admission, onward)             24 Hour Telemetry Monitoring  Continuous x 24 Hours (Telem)           References:    Telemetry Guidelines   Question:  Reason for 24 Hour Telemetry  Answer:  Patients waiting for PCI/EP Study/CABG                          Imaging: No pertinent imaging reviewed      Recent Cultures (last 7 days):         Last 24 Hours Medication List:   Current Facility-Administered Medications   Medication Dose Route Frequency Provider Last Rate    acetaminophen  975 mg Oral Q6H PRN Cassi Peon, CRNP      aluminum-magnesium hydroxide-simethicone  30 mL Oral Q6H PRN Cassi Peon, CRNP      aspirin  81 mg Oral Daily Cassi Peon, CRNP      atorvastatin  20 mg Oral Daily With [employer], CRNP      clonazePAM  0 5 mg Oral BID Cassitobi Nguyen CRNP      clopidogrel  75 mg Oral Daily Betsy Bridges MD      ezetimibe  10 mg Oral Daily Haydee Garcia Josefa Chau, JOHNNIE      gabapentin  600 mg Oral TID JOHNNIE Jean Baptiste      heparin (porcine)  3-20 Units/kg/hr (Order-Specific) Intravenous Titrated Abdirashid Carrera MD Stopped (12/27/21 0915)    lisinopril  10 mg Oral Daily Abdirashid Carrera MD      methocarbamol  750 mg Oral Q6H PRN [place], CRNP      mirtazapine  45 mg Oral HS [facility], LAURANP      morphine injection  2 mg Intravenous Q4H PRN [facility], LAURANP      ondansetron  4 mg Intravenous Q6H PRN [facility], CRNP      oxyCODONE  5 mg Oral Q4H PRN [facility] Dia, JOHNNIE      pantoprazole  20 mg Oral Early Morning [facility], LAURANP      QUEtiapine  100 mg Oral HS [facility] Dia, CRNP      rOPINIRole  1 mg Oral HS [facility], CRNP      simethicone  80 mg Oral 4x Daily PRN Foster Alvares, JOHNNIE      sodium chloride  125 mL/hr Intravenous Continuous Deanna May MD Stopped (12/27/21 1018)    sodium chloride  150 mL/hr Intravenous Continuous Grant Saunders  mL/hr (12/27/21 1019)    verapamil  80 mg Oral Q12H [place] D[place], JOHNNIE      vilazodone  40 mg Oral Daily With Breakfast JOHNNIE Jean Baptiste          Today, Patient Was Seen By: Luis Enrique Lilly PA-C    **Please Note: This note may have been constructed using a voice recognition system  **

## 2021-12-27 NOTE — ASSESSMENT & PLAN NOTE
Patient presented to the ER with chest discomfort, different from his previous symptoms:  7/10 intensity  Was a pressure located in the left anterior chest radiating to the left shoulder and left neck  He had mild associated shortness of breath  He was given nitroglycerin sublingually and aspirin bolus by EMS  Pain was constant until he arrived in the ER, were nitroglycerin paste was applied  Patient notes his pain then decrease to 2/10, and has since completely resolved  · EKGs on admission revealed T-wave inversion in 1 and aVL, as well as V5 and V6  Q-wave in lead V1-V6,  Prior EKGs from 2018 revealed T-wave inversion in V5-V6, and V2  · Patient's 3rd troponin is significantly elevated to 250  · Continue medical therapy:  · Aspirin 81 mg  · Lipitor 40 mg  · Plavix 75 mg  · Zetia  · Verapamil  · Not on beta-blocker due to relative bradycardia a prior history of adverse reaction  · Patient initially requesting to leave the hospital however family convinced to stay  · Underwent cardiac catheterization on 12/27 revealing 95% stent restenoses in the proximal LAD  PCI performed with the placement of KHANH  On visualization of the ostial LAD, he now has a face 95% ostial lesion not amenable to PCI    · Will need transfer to [facility] for cardiothoracic surgery consultation for potential LIMA to LAD  · Patient accepted, awaiting bed

## 2021-12-27 NOTE — CASE MANAGEMENT
Case Management Discharge Planning Note    Patient name Miky Pencil  Location [facility] /E4 -[id number]-* MRN [id number]  : 1965 Date 2021       Current Admission Date: 2021  Current Admission Diagnosis:Coronary artery disease involving native coronary artery of native heart with angina pectoris [facility])   Patient Active Problem List    Diagnosis Date Noted    Episodic cluster headache, not intractable 2021    Chronic pain syndrome 2021    Adenopathy 11/15/2018    Mixed hyperlipidemia 2018    Chest pain with high risk for cardiac etiology 2016    Primary hypertension     Old myocardial infarct     Anxiety with depression     Coronary artery disease involving native coronary artery of native heart with angina pectoris ([facility] [address] )     Anxiety       LOS (days): 3  Geometric Mean LOS (GMLOS) (days):   Days to GMLOS:     OBJECTIVE:  Risk of Unplanned Readmission Score: 14         Current admission status: Inpatient   Preferred Pharmacy:   [address] [place] [address] Drive  [address] [place] [id number]  Phone: [phone] Fax: [phone]    Primary Care Provider: Oliva Booth DO    Primary Insurance: [facility]  Secondary Insurance: Nataly    DISCHARGE DETAILS:      Freedom of Choice: Yes  Comments - Freedom of Choice: Cardiac cath today 21  Per results of cath,  patient to be transferred to [facility] if patient agreeable for consult for LIMA to LAD  Medical Neccesity placed on chart for transfer of pt if needed

## 2021-12-27 NOTE — PROGRESS NOTES
Cardiac cath performed via the R radial artery  95 % in stent restenosis in the proximal LAD  PCI was performed with placement of a 3 0 x 38 mm KHANH with a good result  On visualization of the ostial LAD, he now has a 95% ostial lesion not amendable to PCI  He is hemodynamically stable and needs transfer to B for consult for consideration for LIMA to LAD

## 2021-12-27 NOTE — UTILIZATION REVIEW
Continued Stay Review    SENDING ALL CLINICAL FROM 12/24/21 - SEE BELOW THIS CURRENT REVIEW  Date: 12/27             Current Patient Class: IP  Current Level of Care: MS    HPI:56 y o  male initially admitted on 12/24 as OBS and IP on 12/24 - holiday weekend, needed Cardiac studies  Assessment/Plan:   Waiting for diagnostic studies, Echo and cardiac cath - SEE RESULTS BELOW  Pt will need to transfer to [facility] for consideration of  for LIMA to LAD  Pt is hemodynamically stable at this time        Vital Signs:   12/27/21 1500 -- 59 -- -- -- -- -- -- -- --   12/27/21 1400 -- 59 -- 129/82 -- -- -- -- -- --   12/27/21 1300 -- 61 -- 133/89 -- -- -- -- -- --   12/27/21 1130 -- 68 -- 145/83 -- -- -- -- -- Lying   12/27/21 1100 -- 60 -- 119/74 -- 97 % -- -- None (Room air) Lying   12/27/21 1045 -- 58 -- 116/76 -- -- -- -- -- Lying   12/27/21 1030 -- 62 18 116/73 -- 95 % -- -- None (Room air) Lying   12/27/21 1015 -- 58 18 126/72 -- 95 % -- -- None (Room air) --   12/27/21 08:52:54 -- -- -- -- -- 99 % 28 2 L/min Nasal cannula --   12/27/21 0826 -- -- -- 145/81 -- -- -- -- -- --   12/27/21 0700 97 6 °F (36 4 °C) 61 18 155/76 108 96 % -- -- None (Room air) Lying   12/27/21 0607 -- 64 -- 155/86 113 -- -- -- -- Lying   12/26/21 2300 96 9 °F (36 1 °C) Abnormal  73 -- 142/83 93 96 % -- -- -- --   12/26/21 2000 -- -- -- -- -- -- -- -- None (Room air) --   12/26/21 1900 98 5 °F (36 9 °C) 75 18 151/77 93 96 % -- -- None (Room air) Lying   12/26/21 1500 98 1 °F (36 7 °C) 68 18 139/85 95 97 % -- -- None (Room air) Sitting   12/26/21 1110 98 3 °F (36 8 °C) 70 18 130/79 87 96 % -- -- None (Room air) Lying   12/26/21 0817 97 7 °F (36 5 °C) 78 18 111/73 79 94 % -- -- None (Room air) Sitting   12/26/21 0500 -- 75 -- 120/78 -- -- -- -- -- Lying   12/25/21 2300 98 1 °F (36 7 °C) 71 -- 137/79 -- 98 % -- -- -- Lying   12/25/21 1950 98 2 °F (36 8 °C) 73 16 117/78 86 97 % -- -- None (Room air) Lying   12/25/21 1514 97 9 °F (36 6 °C) 69 18 135/77 88 97 % -- -- None (Room air) Lying   12/25/21 1106 97 8 °F (36 6 °C) 70 18 126/81 88 96 % -- -- None (Room air) Lying   12/25/21 0716 98 °F (36 7 °C) 62 16 157/75 83 96 % -- -- None (Room air) Lying   12/25/21 0354 97 6 °F (36 4 °C) 58 16 132/72 97 96 % -- -- -- Lying     Pertinent Labs/Diagnostic Results:     12/27 Cardiac Cath - 95 % in stent restenosis in the proximal LAD  PCI was performed with placement of a 3 0 x 38 mm KHANH with a good result  On visualization of the ostial LAD, he now has a 95% ostial lesion not amendable to PCI  He is hemodynamically stable and needs transfer to [facility] for consult for consideration for LIMA to LAD     12/27 Echo -   Left Ventricle: Left ventricular cavity size is normal  The left ventricular ejection fraction is 55% by visual estimation  Systolic function is low normal  There is severe hypokinesis of the mid anteroseptal and apical septal walls  There is apical akinesis  There is no concentric hypertrophy  Diastolic function is mildly abnormal, consistent with grade I (abnormal) relaxation    Aortic Valve: The aortic valve is trileaflet  The leaflets are mildly calcified  There increased calcifications involving the non coronary cusp  The leaflets exhibit normal mobility    Mitral Valve: The leaflets are not thickened  There is mild calcification  The leaflets exhibit normal mobility  There is mild annular calcification  There is mild regurgitation    Tricuspid Valve: Pulmonary artery systolic pressures cannot be estimated due to lack of tricuspid regurgitation jet          Results from last 7 days   Lab Units 12/27/21  0641 12/24/21 0413 12/23/21  2354   WBC Thousand/uL 6 48 7 64 8 12   HEMOGLOBIN g/dL 14 9 14 3 14 7   HEMATOCRIT % 44 0 41 0 42 8   PLATELETS Thousands/uL 171 183 165   NEUTROS ABS Thousands/µL  --  4 20  --          Results from last 7 days   Lab Units 12/27/21  0641 12/24/21 0413 12/23/21  2354   SODIUM mmol/L 143 139 138   POTASSIUM mmol/L 3 9 4 4 5 0   CHLORIDE mmol/L 107 105 106   CO2 mmol/L 28 31 27   ANION GAP mmol/L 8 3* 5   BUN mg/dL 18 15 12   CREATININE mg/dL 0 72 0 89 0 67   EGFR ml/min/1 73sq m 102 94 105   CALCIUM mg/dL 8 6 8 9 8 6             Results from last 7 days   Lab Units 12/27/21  0641 12/24/21  0413 12/23/21  2354   GLUCOSE RANDOM mg/dL 96 100 108     Results from last 7 days   Lab Units 12/24/21  0413 12/24/21  0216 12/23/21  2354   HS TNI 0HR ng/L  --   --  6   HS TNI 2HR ng/L  --  29  --    HSTNI D2 ng/L  --  23  --    HS TNI 4HR ng/L 250*  --   --    HSTNI D4 ng/L 244  --   --          Results from last 7 days   Lab Units 12/27/21  0641 12/26/21  0503 12/25/21  2310 12/25/21  0107 12/24/21  1736   PROTIME seconds  --   --   --   --  14 4   INR   --   --   --   --  1 14   PTT seconds 73* 71* 64*   < > 128*    < > = values in this interval not displayed       Medications:   Scheduled Medications:  aspirin, 81 mg, Oral, Daily  atorvastatin, 20 mg, Oral, Daily With Dinner  clonazePAM, 0 5 mg, Oral, BID  clopidogrel, 75 mg, Oral, Daily  ezetimibe, 10 mg, Oral, Daily  gabapentin, 600 mg, Oral, TID  lisinopril, 10 mg, Oral, Daily  mirtazapine, 45 mg, Oral, HS  pantoprazole, 20 mg, Oral, Early Morning  QUEtiapine, 100 mg, Oral, HS  rOPINIRole, 1 mg, Oral, HS  verapamil, 80 mg, Oral, Q12H  vilazodone, 40 mg, Oral, Daily With Breakfast      Continuous IV Infusions:  heparin (porcine), 3-20 Units/kg/hr (Order-Specific), Intravenous, Titrated  sodium chloride, 125 mL/hr, Intravenous, Continuous  sodium chloride, 150 mL/hr, Intravenous, Continuous      PRN Meds:  acetaminophen, 975 mg, Oral, Q6H PRN  aluminum-magnesium hydroxide-simethicone, 30 mL, Oral, Q6H PRN  methocarbamol, 750 mg, Oral, Q6H PRN  morphine injection, 2 mg, Intravenous, Q4H PRN  ondansetron, 4 mg, Intravenous, Q6H PRN  oxyCODONE, 5 mg, Oral, Q4H PRN  simethicone, 80 mg, Oral, 4x Daily PRN      Discharge Plan:  TBD  +++++++++++++++++++++++++++++++++++++++++++    Initial Clinical Review     Admission: Date/Time/Statement: OBS [email or web address] [email or web address] D/T NSTEMI AND NEED FOR CARDIAC CATH           Admission Orders (From admission, onward)              Ordered          12/24/21 1218   Inpatient Admission  Once              12/24/21 0134   Place in Observation  Once                                                                                       Inpatient Admission       Standing Status:   Standing       Number of Occurrences:   1       Order Specific Question:   Level of Care       Answer:   Med Surg [16]       Order Specific Question:   Estimated length of stay       Answer:   More than 2 Midnights       Order Specific Question:   Certification       Answer:   I certify that inpatient services are medically necessary for this patient for a duration of greater than two midnights  See H&P and MD Progress Notes for additional information about the patient's course of treatment             ED Arrival Information      Expected Arrival Acuity     - 12/23/2021 23:42 Emergent           Means of arrival Escorted by Service Admission type     Ambulance [facility] ([facility]) Hospitalist Emergency           Arrival complaint     chest pain               Chief Complaint   Patient presents with    Chest Pain       c/o 8/10 left sided chest pain with rad to jaw and neck  took 2 nitro prior to ems call  ems places 20# IV, gave 1 subl nitro and 324 asa  pain down to 6/10         Initial Presentation: 59 yo male to ED by ems admitted observation [email or web address] to [email or web address] d/t chest pain with high risk for cardiac etiology  Presents with chest pain, took NTG x2 with no improvement  Reports he was seated on the couch when he developed chest pain radiating to his left shoulder with associated shortness of breath  Given ASA bolus by EMS   Chest pain improved in ED after Nitro-Bid applied  PMh Chronic chest pain, htn and CAD s/p anterior wall myocardial infarction with stenting left anterior descending artery in 2009  Mild ischemic cardiomyopathy with apical wall motion abnormality, ejection fraction 46% in 2019, followed outpatient  JIMENA Score: 2  EKG:  Chronic ischemic changes on EKG; normal serum troponin   Cycle troponins and EKG   ASA, statin, ACEI  not on BB due to depression  Tele  Cardiology consult      12/24 CARDIOLOGY CONSULT:Non ST-elevation MI  Stent of the left anterior descending artery 2009 for a STEMI  Hyperlipidemia:  Cholesterol 139, triglycerides 213, HDL 27, LDL calculated 69, non HDL cholesterol 112  IV heparin  Plavix  cardiac catheterization Monday sooner if pain reoccurs  adamant about leaving against medical advice but would be willing to come back for cardiac catheterization on Monday 12/24:Cardiologist recommend pt remain in the hospital for cardiac catheterization  Dee Mickhonorio is currently refusing and insisting discharge  Funmi Jovon with Cardiology assessment that patient is not safe for discharge will be at risk for MI  Recommend plavix load, heparin ACS infusion      12/24 update:  Met with pt's brother and wife at bedside  Gave update and concerns regarding heart attack and need to remain in the hospital for catheterization   Pt agreement with plan and iv heparin infusing  Will be hospitalized >2 midnights due to nstemi and need for cath   Change to inpt status             ED Triage Vitals   Temperature Pulse Respirations Blood Pressure SpO2   12/24/21 0000 12/23/21 2349 12/23/21 2349 12/23/21 2349 12/23/21 2349   98 5 °F (36 9 °C) 70 18 116/63 98 %       Temp Source Heart Rate Source Patient Position - Orthostatic VS BP Location FiO2 (%)   12/24/21 0000 12/23/21 2349 12/23/21 2349 12/23/21 2349 --   Oral Monitor Lying Right arm         Pain Score           12/23/21 2349           6                  Wt Readings from Last 1 Encounters:   12/23/21 74 4 kg (164 lb)      Additional Vital Signs:   12/24/21 0739 97 6 °F (36 4 °C) 64 18 105/69 74 99 % None (Room air) Lying   12/24/21 0354 -- -- -- 114/70 -- -- -- --   12/24/21 0252 97 9 °F (36 6 °C) 66 18 116/73 90 98 % None (Room air) Lying   12/24/21 0130 -- 60 18 126/67 -- 99 % None (Room air) Lying   12/24/21 0045 -- -- -- 115/67 85 -- -- --   12/24/21 0042 -- 64 21 -- -- -- -- --   12/24/21 0030 -- 60 19 120/68 88 98 % -- --   12/24/21 0015 -- 64 16 110/57 81 98 % None (Room air) Lying   12/24/21 0000 98 5 °F (36 9 °C) -- -- -- -- -- -- --   12/23/21 2349 -- 70 18 116/63 -- 98 % None (Room air) Lying         Pertinent Labs/Diagnostic Test Results:             Results from last 7 days   Lab Units 12/24/21 0413 12/23/21 2354   WBC Thousand/uL 7 64 8 12   HEMOGLOBIN g/dL 14 3 14 7   HEMATOCRIT % 41 0 42 8   PLATELETS Thousands/uL 183 165   NEUTROS ABS Thousands/µL 4 20  --                 Results from last 7 days   Lab Units 12/24/21 0413 12/23/21 2354   SODIUM mmol/L 139 138   POTASSIUM mmol/L 4 4 5 0   CHLORIDE mmol/L 105 106   CO2 mmol/L 31 27   ANION GAP mmol/L 3* 5   BUN mg/dL 15 12   CREATININE mg/dL 0 89 0 67   EGFR ml/min/1 73sq m 94 105   CALCIUM mg/dL 8 9 8 6               Results from last 7 days   Lab Units 12/24/21 0413 12/23/21 2354   GLUCOSE RANDOM mg/dL 100 108                Results from last 7 days   Lab Units 12/24/21 0413 12/24/21 0216 12/23/21 2354   HS TNI 0HR ng/L  --   --  6   HS TNI 2HR ng/L  --  29  --    HSTNI D2 ng/L  --  23  --    HS TNI 4HR ng/L 250*  --   --    HSTNI D4 ng/L 244  --   --       12/24 cxr:pending     12/24 ekg:  Normal sinus rhythm  Left axis deviation  Anteroseptal infarct (cited on or before 29-JAN-2016)  T wave abnormality, consider lateral ischemia  Abnormal ECG  When compared with ECG of 24-DEC-2021 02:00, (unconfirmed)  Questionable change in initial forces of Septal leads  Nonspecific T wave abnormality no longer evident in Inferior leads  Confirmed by Flavio Mccray ([id number]) on 12/24/2021 6:17:17 AM     12/24 ekg:  Sinus bradycardia  Left anterior fascicular block  Anteroseptal infarct (cited on or before 29-JAN-2016)  T wave abnormality, consider lateral ischemia  Abnormal ECG  When compared with ECG of 23-DEC-2021 23:51,  No significant change was found  Confirmed by Shelly Romero ([id number]) on 12/24/2021 6:16:18 AM     12/23 ekg:  Normal sinus rhythm  Left anterior fascicular block  Anteroseptal infarct (cited on or before 29-JAN-2016)  T wave abnormality, consider lateral ischemia  Abnormal ECG  When compared with ECG of 23-DEC-2021 23:46, (unconfirmed)  Nonspecific T wave abnormality now evident in Inferior leads  Confirmed by Kenny Ames ([id number]) on 12/24/2021 1:52:10 AM     12/23 ekg:  Normal sinus rhythm  Left anterior fascicular block  Anteroseptal infarct (cited on or before 29-JAN-2016)  T wave abnormality, consider lateral ischemia  Abnormal ECG  When compared with ECG of 29-OCT-2018 11:58,  Questionable change in initial forces of Lateral leads  Non-specific change in ST segment in Anterior leads  Confirmed by Kenny Ames ([id number]) on 12/24/2021 1:52:02 AM        ED Treatment:               Medication Administration from 12/23/2021 2342 to 12/24/2021 0223        Date/Time Order Dose Route Action Action by Comments       12/24/2021 0010 nitroglycerin (NITRO-BID) 2 % TD ointment 1 inch 1 inch Topical Given           12/24/2021 0128 famotidine (PEPCID) tablet 20 mg 20 mg Oral Given              Medical History        Past Medical History:   Diagnosis Date    Angina pectoris (HCC)      Anxiety      Arthritis      Back pain      CAD (coronary artery disease)      DDD (degenerative disc disease), lumbosacral      Depressed      Full dentures      GERD (gastroesophageal reflux disease)      Hyperlipidemia      Hypertension      [facility])       2009    Wears glasses           Present on Admission:   Chronic pain syndrome   Chest pain with high risk for cardiac etiology   Coronary artery disease involving native coronary artery of native heart with angina pectoris ([facility] )   Primary hypertension   Episodic cluster headache, not intractable   Anxiety with depression   Mixed hyperlipidemia        Admitting Diagnosis: Chest pain [R07 9]  Old myocardial infarct [I25 2]  Chest pain with high risk for cardiac etiology [R07 9]  Coronary artery disease involving native coronary artery of native heart with angina pectoris ([facility] ) [I25 119]  Age/Sex: 60 y o  male  Admission Orders:  Scheduled Medications:  aspirin, 81 mg, Oral, Daily  atorvastatin, 20 mg, Oral, Daily With Dinner  clonazePAM, 0 5 mg, Oral, BID  ezetimibe, 10 mg, Oral, Daily  gabapentin, 600 mg, Oral, TID  lisinopril, 20 mg, Oral, Daily  mirtazapine, 45 mg, Oral, HS  pantoprazole, 20 mg, Oral, Early Morning  QUEtiapine, 100 mg, Oral, HS  rOPINIRole, 1 mg, Oral, HS  verapamil, 80 mg, Oral, Q12H  vilazodone, 40 mg, Oral, Daily With Breakfast        PRN Meds:  acetaminophen, 975 mg, Oral, Q6H PRN  aluminum-magnesium hydroxide-simethicone, 30 mL, Oral, Q6H PRN  methocarbamol, 750 mg, Oral, Q6H PRN  morphine injection, 2 mg, Intravenous, Q4H PRN  ondansetron, 4 mg, Intravenous, Q6H PRN  oxyCODONE, 5 mg, Oral, Q4H PRN  simethicone, 80 mg, Oral, 4x Daily PRN     Ambulate  cardiac diet  Tele     IP CONSULT TO CARDIOLOGY    ++++++++++++++++++++++++++++++++++++++++  Continued Stay Review  SEE INITIAL IP REVIEW COMPLETED ON 12/24 BY TRAE FAJARDO       Date: 12/25 inpatient day 2                           Current Patient Class: inpatient                   Current Level of Care: med surg     HPI:56 y o  male initially admitted on 12/24 under obs, converted to IP on 12/24 due to NSTEMI requiring cardiac cath, unstable angina, r/o ACS     Assessment/Plan: 12/25 waiting for cardiac cath  Has no complaints, lungs decreased, no edema  No complaints or recurrent pain  Per cardio: cardiac cath will be done on Monday, 12/27  Remains on IV heparin, no chest pain thus far today   Exam unremarkable       Date 12/26: IP day 3: feels well today, no chest pain  continue medical therapy with med adjustments, bp is low normal requiring reduction in ace dose to avoid hypotension and hypoperfusion  pt wanted to leave AMA but family convinced him to stay  He will need another 24-48 hours in house  Per cardio: waiting for cardiac cath  Had 3 episodes nonsustained VT overnight, 2 episodes 6 beats, 1 episode was 4 beats  Denies chest pain  Needs echo 1st thing in the morning then cath  Exam unremarkable   EKG nsr       Vital Signs:   Date/Time Temp Pulse Resp BP MAP (mmHg) SpO2 O2 Device Patient Position - Orthostatic VS   12/25/21 0716 98 °F (36 7 °C) 62 16 157/75 83 96 % None (Room air) Lying   12/25/21 0354 97 6 °F (36 4 °C) 58 16 132/72 97 96 % -- Lying   12/24/21 2345 98 °F (36 7 °C) 52 Abnormal  16 146/78 106 97 % -- Lying   12/24/21 1932 98 1 °F (36 7 °C) 55 16 127/66 91 96 % -- Lying   12/24/21 1457 98 2 °F (36 8 °C) 64 18 121/72 85 96 % None (Room air) Lying   12/24/21 1124 97 8 °F (36 6 °C) 60 16 109/73 80 97 % None (Room air) Lying            Pertinent Labs/Diagnostic Results:     12/24 CXR: nothing acute  12/24 EKG Sinus bradycardia  Left anterior fascicular block  Anteroseptal infarct (cited on or before 29-JAN-2016)  T wave abnormality, consider lateral ischemia     12/24 EKG#2 Normal sinus rhythm  Left axis deviation  Anteroseptal infarct (cited on or before 29-JAN-2016)  T wave abnormality, consider lateral ischemia           Results from last 7 days   Lab Units 12/24/21  0413 12/23/21  2354   WBC Thousand/uL 7 64 8 12   HEMOGLOBIN g/dL 14 3 14 7   HEMATOCRIT % 41 0 42 8   PLATELETS Thousands/uL 183 165   NEUTROS ABS Thousands/µL 4 20  --                 Results from last 7 days   Lab Units 12/24/21  0413 12/23/21  2354   SODIUM mmol/L 139 138   POTASSIUM mmol/L 4 4 5 0   CHLORIDE mmol/L 105 106   CO2 mmol/L 31 27   ANION GAP mmol/L 3* 5   BUN mg/dL 15 12   CREATININE mg/dL 0 89 0 67   EGFR ml/min/1 73sq m 94 105   CALCIUM mg/dL 8 9 8 6                    Results from last 7 days   Lab Units 12/24/21  0413 12/23/21  2354   GLUCOSE RANDOM mg/dL 100 108                 Results from last 7 days   Lab Units 12/24/21  0413 12/24/21  0216 12/23/21  2354   HS TNI 0HR ng/L  --   --  6   HS TNI 2HR ng/L  --  29  --    HSTNI D2 ng/L  --  23  --    HS TNI 4HR ng/L 250*  --   --    HSTNI D4 ng/L 244  --   --                  Results from last 7 days   Lab Units 12/25/21  0906 12/25/21  0107 12/24/21  1736   PROTIME seconds  --   --  14 4   INR    --   --  1 14   PTT seconds 45* 44* 128*         Medications:   Scheduled Medications:  aspirin, 81 mg, Oral, Daily  atorvastatin, 20 mg, Oral, Daily With Dinner  clonazePAM, 0 5 mg, Oral, BID  clopidogrel, 75 mg, Oral, Daily  ezetimibe, 10 mg, Oral, Daily  gabapentin, 600 mg, Oral, TID  lisinopril, 10 mg, Oral, Daily  mirtazapine, 45 mg, Oral, HS  pantoprazole, 20 mg, Oral, Early Morning  QUEtiapine, 100 mg, Oral, HS  rOPINIRole, 1 mg, Oral, HS  verapamil, 80 mg, Oral, Q12H  vilazodone, 40 mg, Oral, Daily With Breakfast        Continuous IV Infusions:  heparin (porcine), 3-20 Units/kg/hr (Order-Specific), Intravenous, Titrated        PRN Meds:  acetaminophen, 975 mg, Oral, Q6H PRN  aluminum-magnesium hydroxide-simethicone, 30 mL, Oral, Q6H PRN  methocarbamol, 750 mg, Oral, Q6H PRN  morphine injection, 2 mg, Intravenous, Q4H PRN  ondansetron, 4 mg, Intravenous, Q6H PRN  oxyCODONE, 5 mg, Oral, Q4H PRN  simethicone, 80 mg, Oral, 4x Daily PRN      Discharge Plan: D  Network Utilization Review Department  ATTENTION: Please call with any questions or concerns to [phone] and carefully listen to the prompts so that you are directed to the right person   All voicemails are confidential   Maxim Arriaza all requests for admission clinical reviews, approved or denied determinations and any other requests to dedicated fax number below belonging to the campus where the patient is receiving treatment   List of dedicated fax numbers for the Facilities:  [address] DENIALS (Administrative/Medical Necessity) [phone]   [address]  (Maternity/NICU/Pediatrics) [phone]   [address] [place] [address]  [address] Medical [place] [address] [address] [place] [address] Anibal Miller [address] [facility] [phone]

## 2021-12-27 NOTE — ASSESSMENT & PLAN NOTE
Continue Vilazodone, mirtazapine, Seroquel and clonazepam     · PDMP reviewed, patient on clonazepam 0 5mg b i d  P lidia Arredondo

## 2021-12-27 NOTE — ASSESSMENT & PLAN NOTE
Patient has a history CAD s/p anterior wall MI with stenting left anterior descending artery in 2009  · Maintained on aspirin 81 mg, statin, ACEI and prn NTG  · No beta-blocker due to history of depression  · Followed outpatient by Dr Jf Roman, most recently seen 10/05/21 in the office:   At the time it was noted patient does get intermittent sharp chest pain, felt to be atypical, and a six-month stress test was recommended  · Patient underwent cardiac catheterization on 12/27 and at that time there recommending transfer to [facility] for cardiothoracic surgery evaluation for potential LIMA to LAD  · Patient was initially hesitant however family convinced him to stay, now patient is requesting to leave against medical advice as it is taking too long for him to be transferred  · AMA paper signed  · I did send new medications to pharmacy and called Cardiothoracic surgery as an outpatient to schedule outpatient appointment with the patient  · Discussed return precautions

## 2021-12-27 NOTE — PROGRESS NOTES
Successful transport to floor on cardiac monitor, without incident  VSS  Right wrist stable with TR Band intact with 12ml air  Pt received by Zenovia Perez

## 2021-12-27 NOTE — NURSING NOTE
Pt complain of squeezing sensation around heart  SLIM and cardiology notified  ECG obtained  Vitals charted in epic

## 2021-12-28 VITALS
DIASTOLIC BLOOD PRESSURE: 87 MMHG | HEART RATE: 73 BPM | BODY MASS INDEX: 23.48 KG/M2 | HEIGHT: 70 IN | OXYGEN SATURATION: 96 % | TEMPERATURE: 98 F | SYSTOLIC BLOOD PRESSURE: 135 MMHG | WEIGHT: 164 LBS | RESPIRATION RATE: 18 BRPM

## 2021-12-28 LAB
ANION GAP SERPL CALCULATED.3IONS-SCNC: 9 MMOL/L (ref 4–13)
APTT PPP: 30 SECONDS (ref 23–37)
ATRIAL RATE: 58 BPM
ATRIAL RATE: 58 BPM
ATRIAL RATE: 61 BPM
BUN SERPL-MCNC: 15 MG/DL (ref 5–25)
CALCIUM SERPL-MCNC: 8.2 MG/DL (ref 8.3–10.1)
CHLORIDE SERPL-SCNC: 108 MMOL/L (ref 100–108)
CO2 SERPL-SCNC: 27 MMOL/L (ref 21–32)
CREAT SERPL-MCNC: 0.66 MG/DL (ref 0.6–1.3)
ERYTHROCYTE [DISTWIDTH] IN BLOOD BY AUTOMATED COUNT: 11.9 % (ref 11.6–15.1)
GFR SERPL CREATININE-BSD FRML MDRD: 106 ML/MIN/1.73SQ M
GLUCOSE SERPL-MCNC: 116 MG/DL (ref 65–140)
HCT VFR BLD AUTO: 38.8 % (ref 36.5–49.3)
HGB BLD-MCNC: 13.6 G/DL (ref 12–17)
MAGNESIUM SERPL-MCNC: 2 MG/DL (ref 1.6–2.6)
MCH RBC QN AUTO: 31.6 PG (ref 26.8–34.3)
MCHC RBC AUTO-ENTMCNC: 35.1 G/DL (ref 31.4–37.4)
MCV RBC AUTO: 90 FL (ref 82–98)
P AXIS: 69 DEGREES
P AXIS: 76 DEGREES
P AXIS: 77 DEGREES
PLATELET # BLD AUTO: 159 THOUSANDS/UL (ref 149–390)
PMV BLD AUTO: 11.2 FL (ref 8.9–12.7)
POTASSIUM SERPL-SCNC: 3.7 MMOL/L (ref 3.5–5.3)
PR INTERVAL: 174 MS
PR INTERVAL: 176 MS
PR INTERVAL: 196 MS
QRS AXIS: -37 DEGREES
QRS AXIS: -46 DEGREES
QRS AXIS: -50 DEGREES
QRSD INTERVAL: 90 MS
QRSD INTERVAL: 92 MS
QRSD INTERVAL: 98 MS
QT INTERVAL: 472 MS
QT INTERVAL: 478 MS
QT INTERVAL: 490 MS
QTC INTERVAL: 463 MS
QTC INTERVAL: 481 MS
QTC INTERVAL: 481 MS
RBC # BLD AUTO: 4.3 MILLION/UL (ref 3.88–5.62)
SODIUM SERPL-SCNC: 144 MMOL/L (ref 136–145)
T WAVE AXIS: 118 DEGREES
T WAVE AXIS: 125 DEGREES
T WAVE AXIS: 139 DEGREES
VENTRICULAR RATE: 58 BPM
VENTRICULAR RATE: 58 BPM
VENTRICULAR RATE: 61 BPM
WBC # BLD AUTO: 5.98 THOUSAND/UL (ref 4.31–10.16)

## 2021-12-28 PROCEDURE — 99232 SBSQ HOSP IP/OBS MODERATE 35: CPT | Performed by: INTERNAL MEDICINE

## 2021-12-28 PROCEDURE — 85027 COMPLETE CBC AUTOMATED: CPT | Performed by: STUDENT IN AN ORGANIZED HEALTH CARE EDUCATION/TRAINING PROGRAM

## 2021-12-28 PROCEDURE — 93010 ELECTROCARDIOGRAM REPORT: CPT | Performed by: INTERNAL MEDICINE

## 2021-12-28 PROCEDURE — 99239 HOSP IP/OBS DSCHRG MGMT >30: CPT | Performed by: PHYSICIAN ASSISTANT

## 2021-12-28 PROCEDURE — 93971 EXTREMITY STUDY: CPT | Performed by: SURGERY

## 2021-12-28 PROCEDURE — 85730 THROMBOPLASTIN TIME PARTIAL: CPT | Performed by: INTERNAL MEDICINE

## 2021-12-28 PROCEDURE — 80048 BASIC METABOLIC PNL TOTAL CA: CPT | Performed by: STUDENT IN AN ORGANIZED HEALTH CARE EDUCATION/TRAINING PROGRAM

## 2021-12-28 PROCEDURE — 83735 ASSAY OF MAGNESIUM: CPT | Performed by: STUDENT IN AN ORGANIZED HEALTH CARE EDUCATION/TRAINING PROGRAM

## 2021-12-28 PROCEDURE — 93005 ELECTROCARDIOGRAM TRACING: CPT

## 2021-12-28 RX ORDER — NITROGLYCERIN 0.4 MG/1
0.4 TABLET SUBLINGUAL
Status: DISCONTINUED | OUTPATIENT
Start: 2021-12-28 | End: 2021-12-28 | Stop reason: HOSPADM

## 2021-12-28 RX ORDER — ISOSORBIDE MONONITRATE 30 MG/1
30 TABLET, EXTENDED RELEASE ORAL DAILY
Qty: 30 TABLET | Refills: 0 | Status: SHIPPED | OUTPATIENT
Start: 2021-12-29 | End: 2022-01-16 | Stop reason: HOSPADM

## 2021-12-28 RX ORDER — ISOSORBIDE MONONITRATE 30 MG/1
30 TABLET, EXTENDED RELEASE ORAL DAILY
Status: DISCONTINUED | OUTPATIENT
Start: 2021-12-28 | End: 2021-12-28 | Stop reason: HOSPADM

## 2021-12-28 RX ADMIN — VILAZODONE HYDROCHLORIDE 40 MG: 40 TABLET ORAL at 06:34

## 2021-12-28 RX ADMIN — CLOPIDOGREL BISULFATE 75 MG: 75 TABLET ORAL at 09:34

## 2021-12-28 RX ADMIN — ASPIRIN 81 MG: 81 TABLET, COATED ORAL at 09:34

## 2021-12-28 RX ADMIN — PANTOPRAZOLE SODIUM 20 MG: 20 TABLET, DELAYED RELEASE ORAL at 06:34

## 2021-12-28 RX ADMIN — VERAPAMIL HYDROCHLORIDE 80 MG: 80 TABLET ORAL at 06:34

## 2021-12-28 NOTE — PLAN OF CARE
Problem: Potential for Falls  Goal: Patient will remain free of falls  Description: INTERVENTIONS:  - Educate patient/family on patient safety including physical limitations  - Instruct patient to call for assistance with activity   - Consult OT/PT to assist with strengthening/mobility   - Keep Call bell within reach  - Keep bed low and locked with side rails adjusted as appropriate  - Keep care items and personal belongings within reach  - Initiate and maintain comfort rounds  - Make Fall Risk Sign visible to staff  - Apply yellow socks and bracelet for high fall risk patients  - Consider moving patient to room near nurses station  Outcome: Completed     Problem: CARDIOVASCULAR - ADULT  Goal: Maintains optimal cardiac output and hemodynamic stability  Description: INTERVENTIONS:  - Monitor I/O, vital signs and rhythm  - Monitor for S/S and trends of decreased cardiac output  - Administer and titrate ordered vasoactive medications to optimize hemodynamic stability  - Assess quality of pulses, skin color and temperature  - Assess for signs of decreased coronary artery perfusion  - Instruct patient to report change in severity of symptoms  Outcome: Completed  Goal: Absence of cardiac dysrhythmias or at baseline rhythm  Description: INTERVENTIONS:  - Continuous cardiac monitoring, vital signs, obtain 12 lead EKG if ordered  - Administer antiarrhythmic and heart rate control medications as ordered  - Monitor electrolytes and administer replacement therapy as ordered  Outcome: Completed     Problem: PAIN - ADULT  Goal: Verbalizes/displays adequate comfort level or baseline comfort level  Description: Interventions:  - Encourage patient to monitor pain and request assistance  - Assess pain using appropriate pain scale  - Administer analgesics based on type and severity of pain and evaluate response  - Implement non-pharmacological measures as appropriate and evaluate response  - Consider cultural and social influences on pain and pain management  - Notify physician/advanced practitioner if interventions unsuccessful or patient reports new pain  Outcome: Completed     Problem: DISCHARGE PLANNING  Goal: Discharge to home or other facility with appropriate resources  Description: INTERVENTIONS:  - Identify barriers to discharge w/patient and caregiver  - Arrange for needed discharge resources and transportation as appropriate  - Identify discharge learning needs (meds, wound care, etc )  - Arrange for interpretive services to assist at discharge as needed  - Refer to Case Management Department for coordinating discharge planning if the patient needs post-hospital services based on physician/advanced practitioner order or complex needs related to functional status, cognitive ability, or social support system  Outcome: Completed

## 2021-12-28 NOTE — PROGRESS NOTES
Cardiology Progress Note Jame Calix 60 y o  male MRN: [id number]    Unit/Bed#: E4 -01 Encounter: [id number]          Subjective:   Patient seen and examined  No significant telemetry events overnight  He remained in sinus rhythm with no evidence of ventricular ectopy  However, he had an episode of chest discomfort around 5:00 p m  but ECG was unchanged from baseline  Around 9:00 a m , patient refused to take his morning medications  He asked that his heparin drip be discontinued  This is due to him not being transferred to [facility] as of yet  He is upset and not willing to resume his medications  He is endorsing and unsettled [facility]  Hospital Course:   Tomy Rubio is a 60y o  year old male with a history of coronary artery disease with anterior type 1 STEMI in May 2009, essential hypertension, dyslipidemia, remote tobacco use disorder and anxiety with depression  Patient presented to [facility] on 12/24 with 1 day history of left precordial pressure radiating to his left neck/shoulder/arm  Patient ruled in for type 1 NSTEMI with troponin elevation and resolution of symptoms after nitroglycerin  Left heart catheterization revealed proximal LAD in-stent stenosis with proximal stent edge lesion which was treated with 1 drug-eluting stent  Post deployment, there was noted ostial 95% LAD lesion that was not amenable to PCI  Patient is pending transfer to [facility] for cardiothoracic Surgical evaluation  Vitals: Blood pressure 132/68, pulse 65, temperature 97 8 °F (36 6 °C), temperature source Temporal, resp  rate 18, height 5' 10" (1 778 m), weight 74 4 kg (164 lb), SpO2 96 %  , Body mass index is 23 53 kg/m² ,   Orthostatic Blood Pressures      Most Recent Value   Blood Pressure 132/68 filed at 12/28/2021 0321   Patient Position - Orthostatic VS Lying filed at 12/28/2021 0321            Intake/Output Summary (Last 24 hours) at 12/28/2021 0730  Last data filed at 12/27/2021 1730  Gross per 24 hour   Intake 1598 33 ml   Output 0 ml   Net 1598 33 ml       Review of System:  Review of system was conducted and was negative except for as stated in the subjective course  Physical Exam:    GEN: Corry Calix appears uncomfortable and pale, alert and oriented x 3, upset and annoyed   HEENT:  Normocephalic, atraumatic, anicteric, moist mucous membranes  NECK: No JVD  HEART:  Regular rhythm, bradycardic rate, normal S1 and S2, no murmurs  LUNGS: Clear to auscultation bilaterally; no wheezes, rales, or rhonchi; respiration nonlabored on room air  ABDOMEN:  Normoactive bowel sounds, soft, no tenderness, no distention  EXTREMITIES: peripheral pulses palpable; no edema  NEURO: no gross focal findings; cranial nerves grossly intact   SKIN:  Dry, intact, warm to touch    ACCESS:  Right Radial artery has  + 2 pulse with brisk capillary refill  Neurovascular intact to  Right hand  With no evidence of hematoma        Current Facility-Administered Medications:     acetaminophen (TYLENOL) tablet 975 mg, 975 mg, Oral, Q6H PRN, JOHNNIE Johnson, 975 mg at 12/26/21 1709    aluminum-magnesium hydroxide-simethicone (MYLANTA) oral suspension 30 mL, 30 mL, Oral, Q6H PRN, JOHNNIE Johnson    aspirin (ECOTRIN LOW STRENGTH) EC tablet 81 mg, 81 mg, Oral, Daily, JOHNNIE Johnson, 81 mg at 12/27/21 2434    atorvastatin (LIPITOR) tablet 20 mg, 20 mg, Oral, Daily With Dinner, JOHNNIE Johnson, 20 mg at 12/27/21 1705    clonazePAM (KlonoPIN) tablet 0 5 mg, 0 5 mg, Oral, BID, JOHNNIE Johnson, 0 5 mg at 12/27/21 1705    clopidogrel (PLAVIX) tablet 75 mg, 75 mg, Oral, Daily, James Rowley MD, 75 mg at 12/27/21 0435    ezetimibe (ZETIA) tablet 10 mg, 10 mg, Oral, Daily, JOHNNIE Johnson, 10 mg at 12/27/21 1527    gabapentin (NEURONTIN) capsule 600 mg, 600 mg, Oral, TID, JOHNNIE Johnson, 600 mg at 12/27/21 2014    heparin (porcine) 25,000 units in 0 45% NaCl 250 mL infusion (premix), 3-20 Units/kg/hr (Order-Specific), Intravenous, Titrated, Christina Carlisle MD, Last Rate: 6 3 mL/hr at 12/28/21 0013, 9 Units/kg/hr at 12/28/21 0013    lisinopril (ZESTRIL) tablet 10 mg, 10 mg, Oral, Daily, Christina Carlisle MD, 10 mg at 12/27/21 3799    methocarbamol (ROBAXIN) tablet 750 mg, 750 mg, Oral, Q6H PRN, Tere Schanz, CRNP    mirtazapine (REMERON) tablet 45 mg, 45 mg, Oral, HS, Tere Schanz, CRNP, 45 mg at 12/27/21 2309    morphine injection 2 mg, 2 mg, Intravenous, Q4H PRN, Tere Schanz, CRNP, 2 mg at 12/27/21 1705    ondansetron (ZOFRAN) injection 4 mg, 4 mg, Intravenous, Q6H PRN, Tere Schanz, CRNP    oxyCODONE (ROXICODONE) IR tablet 5 mg, 5 mg, Oral, Q4H PRN, Tere Schanz, CRNP    pantoprazole (PROTONIX) EC tablet 20 mg, 20 mg, Oral, Early Morning, Tere Schanz, CRNP, 20 mg at 12/28/21 9172    QUEtiapine (SEROquel) tablet 100 mg, 100 mg, Oral, HS, Tere Schanz, CRNP, 100 mg at 12/27/21 2310    rOPINIRole (REQUIP) tablet 1 mg, 1 mg, Oral, HS, Tere Schanz, CRNP, 1 mg at 12/27/21 2309    simethicone (MYLICON) chewable tablet 80 mg, 80 mg, Oral, 4x Daily PRN, Tere Schanz, CRNP    sodium chloride 0 9 % infusion, 125 mL/hr, Intravenous, Continuous, Daniella Oreilly MD, Stopped at 12/27/21 1018    verapamil (CALAN) tablet 80 mg, 80 mg, Oral, Q12H, Tere Schanz, CRNP, 80 mg at 12/28/21 0634    vilazodone (VIIBRYD) tablet 40 mg, 40 mg, Oral, Daily With Breakfast, JOHNNIE Gautam, 40 mg at 12/28/21 0071    Labs & Results:          Results from last 7 days   Lab Units 12/28/21  0516 12/27/21  0641 12/24/21  0413   POTASSIUM mmol/L 3 7 3 9 4 4   CO2 mmol/L 27 28 31   CHLORIDE mmol/L 108 107 105   BUN mg/dL 15 18 15   CREATININE mg/dL 0 66 0 72 0 89     Results from last 7 days   Lab Units 12/28/21  0516 12/27/21  0641 12/24/21  0413   HEMOGLOBIN g/dL 13 6 14 9 14 3   HEMATOCRIT % 38 8 44 0 41 0 PLATELETS Thousands/uL 159 171 183     Results from last 7 days   Lab Units 12/27/21  0641   TRIGLYCERIDES mg/dL 155*   HDL mg/dL 31*   LDL CALC mg/dL 63       Post cath ECG      Telemetry:   Personally reviewed by Richard Mcconnell MD:  Sinus bradycardia to sinus rhythm with heart rate ranging mid 50s to 70s  No evidence of an SVT or VT  Imaging:  Transthoracic echocardiogram and left heart catheterization films reviewed      VTE Prophylaxis: Heparin ACS protocol       Assessment:  Principal Problem:    NSTEMI (non-ST elevated myocardial infarction) ([address] )  Active Problems:    Primary hypertension    Anxiety with depression    Coronary artery disease involving native coronary artery of native heart with angina pectoris (HCC)    Chest pain with high risk for cardiac etiology    Mixed hyperlipidemia    Episodic cluster headache, not intractable    Chronic pain syndrome        1  Type 1 NSTEMI  -known CAD with anterior STEMI in 05/2009  -05/2009 LH (Mo Stade, R femoral):  100% prox LAD t/w 2 75 x 18 mm Promus KHANH, residual nonobstructive ostial LAD as well as LCX/RCA disease   -hs tropI 6 -> 250  -12/27 lipids:  T 125, , HDL 31, direct LDL 70; A1c 5 5%   -12/27 TTE:  LVEF 55%, mid-apical septal + apical WMA, G1 DD, normal RV size/function, no significant valvulopathy  -12/27 LHC (Jackie Arm, R radial):  95% prox LAD in-stent restenosis t/w 3 0 x 38 mm resolute tanner; 95% ostial LAD lesion post PCI  -aspirin, clopidogrel, atorvastatin, ezetimibe, verapamil, lisinopril  -heparin infusion with therapeutic PTT x2      Plan:  1  Patient is adamant about leaving the hospital today because he feels that he was deceived regarding the timing of his transfer  Attempted to explain to him that there are no beds available despite however best efforts  He is unwilling the reason with  2  He has been refusing all morning medications including dual antiplatelet as well as heparin infusion    After further discussion, he was amenable to at least take the aspirin and clopidogrel but is steadfast about going home even against medical advice  3  In regards to his unsettled stomach, he is not even amenable to receiving Zofran  ECG was repeated and revealed sinus bradycardia with ST/T-wave changes unchanged yesterday post cardiac catheterization  4  Continued effort to help with transfer sooner rather than later  Case discussed and reviewed with Dr Ian Pa  Plan not final, pending attending addendum  Thank you for involving us in the care of your patient  Chela Curiel MD  Cardiology Fellow PGY-6      [employer]/ [employer]/Care Everywhere records reviewed: yes    ** Please Note: Fluency DirectDictation voice to text software may have been used in the creation of this document   **

## 2021-12-28 NOTE — UTILIZATION REVIEW
Inpatient Admission Authorization Request   NOTIFICATION OF INPATIENT ADMISSION/INPATIENT AUTHORIZATION REQUEST   SERVICING FACILITY:   [address] [place], [place] [address], Roxy Sena, [address]   Tax ID: [id number]  NPI: [id number]  Place of Service: Inpatient [address] [facility]  [address]  Place of Service Code: 24     ATTENDING PROVIDER:  Attending Name and NPI#: Marilyn Boggs Md [[id number]]  [facility] [place] [place], [address]   Phone: [phone]     UTILIZATION REVIEW CONTACT:  Gwenette Ormond, Utilization   Network Utilization Review Department  Phone: [phone]  Fax: [phone]  Email: [email or web address]     PHYSICIAN ADVISORY SERVICES:  FOR XWYX-YC-ACNL REVIEW - MEDICAL NECESSITY DENIAL  Phone: [phone]  Fax: [phone]  Email: [email or web address]     TYPE OF REQUEST:  Inpatient Status     ADMISSION INFORMATION:  ADMISSION DATE/TIME: 12/24/21 12:18 PM  PATIENT DIAGNOSIS CODE/DESCRIPTION:  Chest pain [R07 9]  Old myocardial infarct [I25 2]  Chest pain with high risk for cardiac etiology [R07 9]  Coronary artery disease involving native coronary artery of native heart with angina pectoris ([facility] [address] ) [I25 119]  DISCHARGE DATE/TIME: 12/28/2021 11:09 AM  DISCHARGE DISPOSITION (IF DISCHARGED): Left against medical advice or discontinued care     IMPORTANT INFORMATION:  Please contact the Gwenette Ormond directly with any questions or concerns regarding this request  Department voicemails are confidential     Send requests for admission clinical reviews, concurrent reviews, approvals, and administrative denials due to lack of clinical to fax [phone]       Initial Clinical Review     Admission: Date/Time/Statement: [email or web address] UPGRADED TO INPT [email or web address] D/T NSTEMI AND NEED FOR CARDIAC CATH           Admission Orders (From admission, onward)              Ordered          12/24/21 1218   Inpatient Admission  Once              12/24/21 0134   Place in Observation  Once                                                                                       Inpatient Admission       Standing Status:   Standing       Number of Occurrences:   1       Order Specific Question:   Level of Care       Answer:   Med Surg [16]       Order Specific Question:   Estimated length of stay       Answer:   More than 2 Midnights       Order Specific Question:   Certification       Answer:   I certify that inpatient services are medically necessary for this patient for a duration of greater than two midnights  See H&P and MD Progress Notes for additional information about the patient's course of treatment             ED Arrival Information      Expected Arrival Acuity     - 12/23/2021 23:42 Emergent           Means of arrival Escorted by Service Admission type     Ambulance [facility] ([address]) Hospitalist Emergency           Arrival complaint     chest pain               Chief Complaint   Patient presents with    Chest Pain       c/o 8/10 left sided chest pain with rad to jaw and neck  took 2 nitro prior to ems call  ems places 20# IV, gave 1 subl nitro and 324 asa  pain down to 6/10         Initial Presentation: 61 yo male to ED by ems admitted observation [email or web address] d/t chest pain with high risk for cardiac etiology  Presents with chest pain, took NTG x2 with no improvement  Reports he was seated on the couch when he developed chest pain radiating to his left shoulder with associated shortness of breath  Given ASA bolus by EMS   Chest pain improved in ED after Nitro-Bid applied  PMh Chronic chest pain, htn and CAD s/p anterior wall myocardial infarction with stenting left anterior descending artery in 2009  Mild ischemic cardiomyopathy with apical wall motion abnormality, ejection fraction 46% in 2019, followed outpatient  JIMENA Score: 2  EKG:  Chronic ischemic changes on EKG; normal serum troponin   Cycle troponins and EKG  ASA, statin, ACEI  not on BB due to depression  Tele  Cardiology consult      12/24 CARDIOLOGY CONSULT:Non ST-elevation MI  Stent of the left anterior descending artery 2009 for a STEMI  Hyperlipidemia:  Cholesterol 139, triglycerides 213, HDL 27, LDL calculated 69, non HDL cholesterol 112  IV heparin  Plavix  cardiac catheterization Monday sooner if pain reoccurs  adamant about leaving against medical advice but would be willing to come back for cardiac catheterization on Monday 12/24:Cardiologist recommend pt remain in the hospital for cardiac catheterization  Cee Myers is currently refusing and insisting discharge  Moises Marquez with Cardiology assessment that patient is not safe for discharge will be at risk for MI  Recommend plavix load, heparin ACS infusion      12/24 update:  Met with pt's brother and wife at bedside  Gave update and concerns regarding heart attack and need to remain in the hospital for catheterization   Pt agreement with plan and iv heparin infusing  Will be hospitalized >2 midnights due to nstemi and need for cath   Change to inpt status             ED Triage Vitals   Temperature Pulse Respirations Blood Pressure SpO2   12/24/21 0000 12/23/21 2349 12/23/21 2349 12/23/21 2349 12/23/21 2349   98 5 °F (36 9 °C) 70 18 116/63 98 %       Temp Source Heart Rate Source Patient Position - Orthostatic VS BP Location FiO2 (%)   12/24/21 0000 12/23/21 2349 12/23/21 2349 12/23/21 2349 --   Oral Monitor Lying Right arm         Pain Score           12/23/21 2349           6                  Wt Readings from Last 1 Encounters:   12/23/21 74 4 kg (164 lb)      Additional Vital Signs:   12/24/21 0739 97 6 °F (36 4 °C) 64 18 105/69 74 99 % None (Room air) Lying   12/24/21 0354 -- -- -- 114/70 -- -- -- --   12/24/21 0252 97 9 °F (36 6 °C) 66 18 116/73 90 98 % None (Room air) Lying   12/24/21 0130 -- 60 18 126/67 -- 99 % None (Room air) Lying   12/24/21 0045 -- -- -- 115/67 85 -- -- --   12/24/21 0042 -- 64 21 -- -- -- -- --   12/24/21 0030 -- 60 19 120/68 88 98 % -- --   12/24/21 0015 -- 64 16 110/57 81 98 % None (Room air) Lying   12/24/21 0000 98 5 °F (36 9 °C) -- -- -- -- -- -- --   12/23/21 2349 -- 70 18 116/63 -- 98 % None (Room air) Lying         Pertinent Labs/Diagnostic Test Results:             Results from last 7 days   Lab Units 12/24/21 0413 12/23/21  2354   WBC Thousand/uL 7 64 8 12   HEMOGLOBIN g/dL 14 3 14 7   HEMATOCRIT % 41 0 42 8   PLATELETS Thousands/uL 183 165   NEUTROS ABS Thousands/µL 4 20  --                 Results from last 7 days   Lab Units 12/24/21 0413 12/23/21  2354   SODIUM mmol/L 139 138   POTASSIUM mmol/L 4 4 5 0   CHLORIDE mmol/L 105 106   CO2 mmol/L 31 27   ANION GAP mmol/L 3* 5   BUN mg/dL 15 12   CREATININE mg/dL 0 89 0 67   EGFR ml/min/1 73sq m 94 105   CALCIUM mg/dL 8 9 8 6               Results from last 7 days   Lab Units 12/24/21  0413 12/23/21  2354   GLUCOSE RANDOM mg/dL 100 108                Results from last 7 days   Lab Units 12/24/21 0413 12/24/21  0216 12/23/21  2354   HS TNI 0HR ng/L  --   --  6   HS TNI 2HR ng/L  --  29  --    HSTNI D2 ng/L  --  23  --    HS TNI 4HR ng/L 250*  --   --    HSTNI D4 ng/L 244  --   --       12/24 cxr:pending     12/24 ekg:  Normal sinus rhythm  Left axis deviation  Anteroseptal infarct (cited on or before 29-JAN-2016)  T wave abnormality, consider lateral ischemia  Abnormal ECG  When compared with ECG of 24-DEC-2021 02:00, (unconfirmed)  Questionable change in initial forces of Septal leads  Nonspecific T wave abnormality no longer evident in Inferior leads  Confirmed by Memo Hernandez ([id number]) on 12/24/2021 6:17:17 AM     12/24 ekg:  Sinus bradycardia  Left anterior fascicular block  Anteroseptal infarct (cited on or before 29-JAN-2016)  T wave abnormality, consider lateral ischemia  Abnormal ECG  When compared with ECG of 23-DEC-2021 23:51,  No significant change was found  Confirmed by Memo Hernandez ([id number]) on 12/24/2021 6:16:18 AM     12/23 ekg:  Normal sinus rhythm  Left anterior fascicular block  Anteroseptal infarct (cited on or before 29-JAN-2016)  T wave abnormality, consider lateral ischemia  Abnormal ECG  When compared with ECG of 23-DEC-2021 23:46, (unconfirmed)  Nonspecific T wave abnormality now evident in Inferior leads  Confirmed by Kenny Ames ([id number]) on 12/24/2021 1:52:10 AM     12/23 ekg:  Normal sinus rhythm  Left anterior fascicular block  Anteroseptal infarct (cited on or before 29-JAN-2016)  T wave abnormality, consider lateral ischemia  Abnormal ECG  When compared with ECG of 29-OCT-2018 11:58,  Questionable change in initial forces of Lateral leads  Non-specific change in ST segment in Anterior leads  Confirmed by Kenny Ames ([id number]) on 12/24/2021 1:52:02 AM        ED Treatment:               Medication Administration from 12/23/2021 2342 to 12/24/2021 0223        Date/Time Order Dose Route Action Action by Comments       12/24/2021 0010 nitroglycerin (NITRO-BID) 2 % TD ointment 1 inch 1 inch Topical Given           12/24/2021 0128 famotidine (PEPCID) tablet 20 mg 20 mg Oral Given              Medical History        Past Medical History:   Diagnosis Date    Angina pectoris (HCC)      Anxiety      Arthritis      Back pain      CAD (coronary artery disease)      DDD (degenerative disc disease), lumbosacral      Depressed      Full dentures      GERD (gastroesophageal reflux disease)      Hyperlipidemia      Hypertension      [facility] ([facility])       2009    Wears glasses           Present on Admission:   Chronic pain syndrome   Chest pain with high risk for cardiac etiology   Coronary artery disease involving native coronary artery of native heart with angina pectoris ([address] )   Primary hypertension   Episodic cluster headache, not intractable   Anxiety with depression   Mixed hyperlipidemia        Admitting Diagnosis: Chest pain [R07 9]  Old myocardial infarct [I25 2]  Chest pain with high risk for cardiac etiology [R07 9]  Coronary artery disease involving native coronary artery of native heart with angina pectoris ([address] ) [I25 119]  Age/Sex: 60 y o  male  Admission Orders:  Scheduled Medications:  aspirin, 81 mg, Oral, Daily  atorvastatin, 20 mg, Oral, Daily With Dinner  clonazePAM, 0 5 mg, Oral, BID  ezetimibe, 10 mg, Oral, Daily  gabapentin, 600 mg, Oral, TID  lisinopril, 20 mg, Oral, Daily  mirtazapine, 45 mg, Oral, HS  pantoprazole, 20 mg, Oral, Early Morning  QUEtiapine, 100 mg, Oral, HS  rOPINIRole, 1 mg, Oral, HS  verapamil, 80 mg, Oral, Q12H  vilazodone, 40 mg, Oral, Daily With Breakfast        PRN Meds:  acetaminophen, 975 mg, Oral, Q6H PRN  aluminum-magnesium hydroxide-simethicone, 30 mL, Oral, Q6H PRN  methocarbamol, 750 mg, Oral, Q6H PRN  morphine injection, 2 mg, Intravenous, Q4H PRN  ondansetron, 4 mg, Intravenous, Q6H PRN  oxyCODONE, 5 mg, Oral, Q4H PRN  simethicone, 80 mg, Oral, 4x Daily PRN     Ambulate  cardiac diet  Tele     IP CONSULT TO CARDIOLOGY     Network Utilization Review Department  ATTENTION: Please call with any questions or concerns to [phone] and carefully listen to the prompts so that you are directed to the right person  All voicemails are confidential   Oly Barragan all requests for admission clinical reviews, approved or denied determinations and any other requests to dedicated fax number below belonging to the campus where the patient is receiving treatment   List of dedicated fax numbers for the Facilities:  [address] DENIALS (Administrative/Medical Necessity) [phone]   [address] [place] [address] (Maternity/NICU/Pediatrics) 261 [facility] [place] [address] [address] [address] [place] [address] [address] [place] [address] [address] [facility] [phone]

## 2021-12-28 NOTE — ASSESSMENT & PLAN NOTE
Continue Vilazodone, mirtazapine, Seroquel and clonazepam     · PDMP reviewed, patient on clonazepam 0 5mg b i d  P r n  Glenn Ramírez

## 2021-12-28 NOTE — DISCHARGE SUMMARY
[address]  Discharge- Elliott Calix 1965, 60 y o  male MRN: [id number]  Unit/Bed#: E4 -01 Encounter: [id number]  Primary Care Provider: Alan Lobo DO   Date and time admitted to hospital: 12/23/2021 11:42 PM    * NSTEMI (non-ST elevated myocardial infarction) [facility])  Assessment & Plan  Patient presented to the ER with chest discomfort, different from his previous symptoms:  7/10 intensity  Was a pressure located in the left anterior chest radiating to the left shoulder and left neck  He had mild associated shortness of breath  He was given nitroglycerin sublingually and aspirin bolus by EMS  Pain was constant until he arrived in the ER, were nitroglycerin paste was applied  Patient notes his pain then decrease to 2/10, and has since completely resolved  · EKGs on admission revealed T-wave inversion in 1 and aVL, as well as V5 and V6  Q-wave in lead V1-V6,  Prior EKGs from 2018 revealed T-wave inversion in V5-V6, and V2  · Patient's 3rd troponin is significantly elevated to 250  · Continue medical therapy:  · Aspirin 81 mg  · Lipitor 40 mg  · Plavix 75 mg  · Zetia  · Verapamil  · Imdur  · Not on beta-blocker due to relative bradycardia a prior history of adverse reaction  · Patient initially requesting to leave the hospital however family convinced to stay  · Underwent cardiac catheterization on 12/27 revealing 95% stent restenoses in the proximal LAD  PCI performed with the placement of KHANH  On visualization of the ostial LAD, he now has a face 95% ostial lesion not amenable to PCI  · Needs transfer to [facility] for cardiothoracic surgery evaluation for potential LIMA to LAD  · Initially patient was hesitant and did not want to go to [facility] ever family convince him otherwise    · Transfer was started however today patient is now not agreeable to waiting for bed availability at [facility] and is requesting to leave against medical advice  · AMA papers signed and risks discussed which include additional heart attack, stroke, and death  · New medications sent to the patient's pharmacy and I did set up a outpatient appointment with Cardiothoracic surgery    Coronary artery disease involving native coronary artery of native heart with angina pectoris [facility])  Assessment & Plan  Patient has a history CAD s/p anterior wall MI with stenting left anterior descending artery in 2009  · Maintained on aspirin 81 mg, statin, ACEI and prn NTG  · No beta-blocker due to history of depression  · Followed outpatient by Dr Lucio Whittaker, most recently seen 10/05/21 in the office: At the time it was noted patient does get intermittent sharp chest pain, felt to be atypical, and a six-month stress test was recommended  · Patient underwent cardiac catheterization on 12/27 and at that time there recommending transfer to [facility] for cardiothoracic surgery evaluation for potential LIMA to LAD  · Patient was initially hesitant however family convinced him to stay, now patient is requesting to leave against medical advice as it is taking too long for him to be transferred  · AMA paper signed  · I did send new medications to pharmacy and called Cardiothoracic surgery as an outpatient to schedule outpatient appointment with the patient  · Discussed return precautions    Chronic pain syndrome  Assessment & Plan  · Chronic pain treated with gabapentin 600mg TID, PRN Robaxin and Tramadol 50mg daily prn, continue  Tramadol verified on PDMP      Episodic cluster headache, not intractable  Assessment & Plan  · Continue home verapamil    Mixed hyperlipidemia  Assessment & Plan  · Continue statin and zetia    Chest pain with high risk for cardiac etiology  Assessment & Plan  In the setting of coronary artery disease status post LAD stenting for STEMI  · Cardiology consulted, to undergo cardiac catheterization on 12/27    Anxiety with depression  Assessment & Plan  Continue Vilazodone, mirtazapine, Seroquel and clonazepam     · PDMP reviewed, patient on clonazepam 0 5mg b i d  P r n  Primary hypertension  Assessment & Plan  Home medications include lisinopril 20 mg and verapamil 80 mg  · Blood pressure adequately controlled:  Low-normal:  Will decrease lisinopril to 10 mg to avoid hypotension and hypoperfusion      Medical Problems             Resolved Problems  Date Reviewed: 12/27/2021    None              Discharging Physician / Practitioner: Leroy Sifuentes PA-C  PCP: Marialuisa Polanco DO  Admission Date:   Admission Orders (From admission, onward)     Ordered        12/24/21 1218  Inpatient Admission  Once            12/24/21 0134  Place in Observation  Once                      Discharge Date: 12/28/21    Consultations During Hospital Stay:  · Cardiology    Procedures Performed:   · Cardiac catheterization    Significant Findings / Test Results:   CHEST      INDICATION:   chest pain      COMPARISON:  Chest radiograph from 10/29/2018 and chest CT from 2/2/2010      EXAM PERFORMED/VIEWS:  XR CHEST PA & LATERAL        FINDINGS:     Cardiomediastinal silhouette appears unremarkable      No acute disease  No pneumothorax or pleural effusion  Benign left upper lobe nodule, stable since 2010      Osseous structures appear within normal limits for patient age      IMPRESSION:     No acute cardiopulmonary disease  [facility] REPORT  CLINICAL:  Indications:  Patient presents for a general health evaluation secondary to future open heart  surgery  Patient is asymptomatic at this time  Risk Factors  The patient has history of HTN, Hyperlipidemia, CAD and previous smoking (quit  >10yrs ago)  FINDINGS:     Right        Impression  PSV  EDV (cm/s)  Direction of Flow  Ratio    Dist  ICA                 79          24                      0 98    Mid  ICA                  89          32                      1 10    Prox   ICA    1 - 49%      63          18 0 78    Dist CCA                  73          22                              Mid CCA                   81          22                      0 89    Prox CCA                  91          18                              Ext Carotid               97           8                      1 20    Prox Vert                 65          16  Antegrade                   Subclavian               121           0                                 Left         Impression  PSV  EDV (cm/s)  Direction of Flow  Ratio    Dist  ICA                 83          28                      0 96    Mid  ICA                  69          22                      0 80    Prox  ICA    1 - 49%      94          16                      1 10    Dist CCA                  86          16                              Mid CCA                   86          11                      0 76    Prox CCA                 114          19                              Ext Carotid              117           0                      1 36    Prox Vert                 65          16  Antegrade                   Subclavian               128           0                                       CONCLUSION:     Impression  RIGHT:  There is <50% stenosis noted in the internal carotid artery  Plaque is  homogenous and smooth  Vertebral artery flow is antegrade  There is no significant subclavian artery  disease  LEFT:  There is <50% stenosis noted in the internal carotid artery  Plaque is  homogenous and smooth  Vertebral artery flow is antegrade  There is no significant subclavian artery  disease  Compared to previous study on 6/2/2009, there is no significant change  Internal carotid artery stenosis determination by consensus criteria from:  Akash Daly et al  Carotid Artery Stenosis: Gray-Scale and Doppler US Diagnosis  - Society of Radiologists in [address] [place] [address], Radiology 2003;  [phone]      Incidental Findings:   · None     Test Results Pending at Discharge (will require follow up): · None     Outpatient Tests Requested:  · Follow-up with PCP  · Follow-up with cardiothoracic surgery  · Follow-up with cardiology    Complications:  None    Reason for Admission:  NSTEMI    Hospital Course:   Alexus Weir is a 60 y o  male patient with past medical history of coronary artery disease, STEMI status post PCI with LAD stenting who originally presented to the hospital on 12/23/2021 due to chest pain  According to the patient, chest pain began on the day of admission when he was seated on the couch and radiated to his left shoulder with associated shortness of breath  This prompted him to take 2 nitroglycerin without any relief and therefore he called EMS for further evaluation in the emergency department  Patient was admitted to the hospital and Cardiology evaluated the patient  EKGs revealed some T-wave inversion and troponin elevation  He initially was requesting to be discharged and attempted to leave against medical advice however his family convinced him to stay  He denied any further shortness of breath/chest pain during his stay and underwent cardiac catheterization on 12/27/2021 which revealed 95 stent restenoses in the proximal LAD, PCI performed  Ostial LAD with 95% ostial lesion not amenable to PCI  Cardiology recommending the patient be transferred to [facility] for consultation from Cardiothoracic surgery for consideration for LIMA to LAD  When this was presented to the patient, initially attempted to sign out AMA again however family members convinced to stay and transferred to [facility]  This transfer process was begun on 12/27 however the patient became significantly upset on 12/28 stating that we lied to him and that he thought that he would be getting a bed right away    I explained to him this was not possible as we are currently full at our [address] and I discussed this with PACs   The patient insisted on leaving against medical advice despite multiple attempts to convince him otherwise  Despite this, I did send all of his new prescription to his pharmacy and I also called outpatient Cardiothoracic surgery for follow-up appointment  I discussed return precautions the patient at length he expressed understanding  AMA paperwork signed  Please see above list of diagnoses and related plan for additional information  Condition at Discharge: serious    Discharge Day Visit / Exam:   Subjective: The patient would like to leave against medical advice, will not answer any further questions  Vitals: Blood Pressure: 135/87 (12/28/21 0731)  Pulse: 73 (12/28/21 0731)  Temperature: 98 °F (36 7 °C) (12/28/21 0731)  Temp Source: Temporal (12/28/21 0731)  Respirations: 18 (12/28/21 0731)  Height: 5' 10" (177 8 cm) (12/27/21 0826)  Weight - Scale: 74 4 kg (164 lb) (12/27/21 0826)  SpO2: 96 % (12/28/21 0731)  Exam:   Physical Exam  Constitutional:       General: He is not in acute distress  Appearance: Normal appearance  He is not ill-appearing, toxic-appearing or diaphoretic  Eyes:      General: No scleral icterus  Conjunctiva/sclera: Conjunctivae normal    Cardiovascular:      Rate and Rhythm: Normal rate and regular rhythm  Heart sounds: No murmur heard  No friction rub  No gallop  Pulmonary:      Effort: Pulmonary effort is normal       Breath sounds: Normal breath sounds  No stridor  No wheezing, rhonchi or rales  Chest:      Chest wall: No tenderness  Musculoskeletal:      Cervical back: Normal range of motion  Right lower leg: No edema  Left lower leg: No edema  Skin:     General: Skin is warm and dry  Capillary Refill: Capillary refill takes less than 2 seconds  Coloration: Skin is not jaundiced or pale  Neurological:      General: No focal deficit present  Mental Status: He is alert and oriented to person, place, and time   Mental status is at baseline  Discussion with Family: Patient declined call to   Discharge instructions/Information to patient and family:   See after visit summary for information provided to patient and family  Provisions for Follow-Up Care:  See after visit summary for information related to follow-up care and any pertinent home health orders  Disposition:   Home    Planned Readmission:  Not applicable     Discharge Statement:  I spent 40 minutes discharging the patient  This time was spent on the day of discharge  I had direct contact with the patient on the day of discharge  Greater than 50% of the total time was spent examining patient, answering all patient questions, arranging and discussing plan of care with patient as well as directly providing post-discharge instructions  Additional time then spent on discharge activities  Discharge Medications:  See after visit summary for reconciled discharge medications provided to patient and/or family        **Please Note: This note may have been constructed using a voice recognition system**

## 2021-12-28 NOTE — ASSESSMENT & PLAN NOTE
Patient presented to the ER with chest discomfort, different from his previous symptoms:  7/10 intensity  Was a pressure located in the left anterior chest radiating to the left shoulder and left neck  He had mild associated shortness of breath  He was given nitroglycerin sublingually and aspirin bolus by EMS  Pain was constant until he arrived in the ER, were nitroglycerin paste was applied  Patient notes his pain then decrease to 2/10, and has since completely resolved  · EKGs on admission revealed T-wave inversion in 1 and aVL, as well as V5 and V6  Q-wave in lead V1-V6,  Prior EKGs from 2018 revealed T-wave inversion in V5-V6, and V2  · Patient's 3rd troponin is significantly elevated to 250  · Continue medical therapy:  · Aspirin 81 mg  · Lipitor 40 mg  · Plavix 75 mg  · Zetia  · Verapamil  · Imdur  · Not on beta-blocker due to relative bradycardia a prior history of adverse reaction  · Patient initially requesting to leave the hospital however family convinced to stay  · Underwent cardiac catheterization on 12/27 revealing 95% stent restenoses in the proximal LAD  PCI performed with the placement of KHANH  On visualization of the ostial LAD, he now has a face 95% ostial lesion not amenable to PCI  · Needs transfer to [facility] for cardiothoracic surgery evaluation for potential LIMA to LAD  · Initially patient was hesitant and did not want to go to [facility] ever family convince him otherwise    · Transfer was started however today patient is now not agreeable to waiting for bed availability at [facility] and is requesting to leave against medical advice  · AMA papers signed and risks discussed which include additional heart attack, stroke, and death  · New medications sent to the patient's pharmacy and I did set up a outpatient appointment with Cardiothoracic surgery

## 2021-12-28 NOTE — NURSING NOTE
Upon attempting to administer AM medications, pt requested to know if he was being transferred to [facility]  Explained to patient that transfer had been accepted, but that there are no beds currently available for him  Upon hearing this, pt demanded to speak to his cardiologist and said, "I'm done, I'm done, I'm going home "  Pt refused all medications and asked heparin gtt to be removed, stating that if the IV was not removed he would pull it out himself  At [address] informed TRINA Hoskins and Cardiologist Chriss Choe that pt was requesting to speak to them and to go home  After being seen by cardiology and TRINA PIERRE [facility] pt still asking to sign out AMA, papers signed [facility]  IV removed, AVS reviewed with patient and family  Pt educated regarding possible risks and warning signs to return to hospital or contact physician  Pt received post cath documentation  No belongings left in room  Pt walked out with family members

## 2021-12-29 ENCOUNTER — TELEPHONE (OUTPATIENT)
Dept: CARDIOLOGY CLINIC | Facility: CLINIC | Age: 56
End: 2021-12-29

## 2022-01-07 ENCOUNTER — OFFICE VISIT (OUTPATIENT)
Dept: CARDIAC SURGERY | Facility: CLINIC | Age: 57
End: 2022-01-07
Payer: MEDICARE

## 2022-01-07 ENCOUNTER — APPOINTMENT (OUTPATIENT)
Dept: LAB | Facility: HOSPITAL | Age: 57
End: 2022-01-07
Payer: MEDICARE

## 2022-01-07 ENCOUNTER — HOSPITAL ENCOUNTER (OUTPATIENT)
Dept: CT IMAGING | Facility: HOSPITAL | Age: 57
Discharge: HOME | End: 2022-01-07
Payer: MEDICARE

## 2022-01-07 ENCOUNTER — LAB REQUISITION (OUTPATIENT)
Dept: LAB | Facility: HOSPITAL | Age: 57
End: 2022-01-07
Payer: MEDICARE

## 2022-01-07 VITALS
OXYGEN SATURATION: 98 % | SYSTOLIC BLOOD PRESSURE: 125 MMHG | DIASTOLIC BLOOD PRESSURE: 74 MMHG | HEART RATE: 80 BPM | BODY MASS INDEX: 22.76 KG/M2 | HEIGHT: 70 IN | TEMPERATURE: 97.9 F | RESPIRATION RATE: 18 BRPM | WEIGHT: 159 LBS

## 2022-01-07 DIAGNOSIS — I25.2 OLD MYOCARDIAL INFARCT: ICD-10-CM

## 2022-01-07 DIAGNOSIS — E78.2 MIXED HYPERLIPIDEMIA: ICD-10-CM

## 2022-01-07 DIAGNOSIS — I25.119 CORONARY ARTERY DISEASE INVOLVING NATIVE CORONARY ARTERY OF NATIVE HEART WITH ANGINA PECTORIS (HCC): Chronic | ICD-10-CM

## 2022-01-07 DIAGNOSIS — Z01.818 PREOPERATIVE CLEARANCE: ICD-10-CM

## 2022-01-07 DIAGNOSIS — Z01.818 PREOP TESTING: ICD-10-CM

## 2022-01-07 DIAGNOSIS — I21.4 NSTEMI (NON-ST ELEVATED MYOCARDIAL INFARCTION) (HCC): ICD-10-CM

## 2022-01-07 DIAGNOSIS — Z01.810 PREOP CARDIOVASCULAR EXAM: ICD-10-CM

## 2022-01-07 DIAGNOSIS — I10 PRIMARY HYPERTENSION: ICD-10-CM

## 2022-01-07 DIAGNOSIS — I25.2 OLD MYOCARDIAL INFARCTION: ICD-10-CM

## 2022-01-07 DIAGNOSIS — I21.4 NON-ST ELEVATION (NSTEMI) MYOCARDIAL INFARCTION (HCC): ICD-10-CM

## 2022-01-07 DIAGNOSIS — I10 ESSENTIAL (PRIMARY) HYPERTENSION: ICD-10-CM

## 2022-01-07 DIAGNOSIS — I47.2 VENTRICULAR TACHYCARDIA (HCC): ICD-10-CM

## 2022-01-07 DIAGNOSIS — Z01.818 ENCOUNTER FOR OTHER PREPROCEDURAL EXAMINATION: ICD-10-CM

## 2022-01-07 DIAGNOSIS — I25.119 ATHEROSCLEROTIC HEART DISEASE OF NATIVE CORONARY ARTERY WITH UNSPECIFIED ANGINA PECTORIS (HCC): ICD-10-CM

## 2022-01-07 DIAGNOSIS — I25.119 CORONARY ARTERY DISEASE INVOLVING NATIVE CORONARY ARTERY OF NATIVE HEART WITH ANGINA PECTORIS (HCC): Primary | Chronic | ICD-10-CM

## 2022-01-07 DIAGNOSIS — Z01.818 PREOP EXAMINATION: ICD-10-CM

## 2022-01-07 PROBLEM — I47.20 VENTRICULAR TACHYCARDIA: Status: ACTIVE | Noted: 2022-01-07

## 2022-01-07 LAB
EST. AVERAGE GLUCOSE BLD GHB EST-MCNC: 105 MG/DL
HBA1C MFR BLD: 5.3 %

## 2022-01-07 PROCEDURE — 86850 RBC ANTIBODY SCREEN: CPT | Performed by: PHYSICIAN ASSISTANT

## 2022-01-07 PROCEDURE — 86900 BLOOD TYPING SEROLOGIC ABO: CPT | Performed by: PHYSICIAN ASSISTANT

## 2022-01-07 PROCEDURE — 86901 BLOOD TYPING SEROLOGIC RH(D): CPT | Performed by: PHYSICIAN ASSISTANT

## 2022-01-07 PROCEDURE — 99205 OFFICE O/P NEW HI 60 MIN: CPT | Performed by: PHYSICIAN ASSISTANT

## 2022-01-07 PROCEDURE — 71250 CT THORAX DX C-: CPT

## 2022-01-07 PROCEDURE — G1004 CDSM NDSC: HCPCS

## 2022-01-07 PROCEDURE — 87081 CULTURE SCREEN ONLY: CPT

## 2022-01-07 PROCEDURE — 36415 COLL VENOUS BLD VENIPUNCTURE: CPT

## 2022-01-07 PROCEDURE — 83036 HEMOGLOBIN GLYCOSYLATED A1C: CPT

## 2022-01-07 RX ORDER — CHLORHEXIDINE GLUCONATE 0.12 MG/ML
15 RINSE ORAL ONCE
Status: CANCELLED | OUTPATIENT
Start: 2022-01-07 | End: 2022-01-07

## 2022-01-07 RX ORDER — CEFAZOLIN SODIUM 2 G/50ML
2000 SOLUTION INTRAVENOUS ONCE
Status: CANCELLED | OUTPATIENT
Start: 2022-01-07 | End: 2022-01-07

## 2022-01-07 NOTE — H&P (VIEW-ONLY)
Consultation - Cardiothoracic Surgery   [facility] 60 y o  male MRN: [id number]    Physician Requesting Consult: Sulaiman Leger PA-C    Reason for Consult / Principal Problem: Coronary artery disease    History of Present Illness: [facility] is a 60y o  year old male with PMH of CAD- anterior MI 5/2009 w/ PCI/KHANH to LAD, NSTEMI 12/21 w/ unsuccessful PCI to LAD, HTN (no BB 2/2 bradycardia/depression), HLD, VT, GERD, chronic pain syndrome (gabapentin, PRN robaxin and tramadol), DDD, thoracic spondylosis s/p spinal nerve blocks, cluster HA (verapamil) arthritis, anxiety depression who was recently admitted to [facility] (12/23 -12/28), found to have NSTEMI with unsuccessful PCI to LAD  Patient was recommended to be transferred to [facility] for surgical evaluation but signed out AMA due to not wanting to wait for bed to open  Patient follows with cardiologist Dr Rosana Villalobos (last seen in October of 2021) recommended at that time for stress test in 6 months  Upon interview, patient relays story above  Reports he has been experiencing intermittent sharp L anterior CP with radiation to left shoulder and neck for the past year, associated with SOB  He states he has had a total of ~3 episodes of CP, resolved in 10 min  Occurring both at rest and minimal exertion  Patient reports he lives a relatively sedentary lifestyle to avoid developing CP  He denies other associated symptoms  Does report episodes of lightheadedness since discharge from [facility] which he attributes to his plavix  He denies syncope, PND, or LE edema  He does report orthopnea (sleeps with 5 pillows at night)  Patient lives with wife and 3 kids  He is retired  No pertinent surgical history  FH pertinent for father who passed from MI at age of 64 and brother with CAD s/p CABG ([place])  30 pk/yr hx (quit in 2009), denies alcohol or tobacco use  Dentures  Covid vaccinated       Past Medical History:  Past Medical History:   Diagnosis Date    Angina pectoris ([facility] [address] )     Anxiety     Arthritis     Back pain     CAD (coronary artery disease)     DDD (degenerative disc disease), lumbosacral     Depressed     Full dentures     GERD (gastroesophageal reflux disease)     Hyperlipidemia     Hypertension     [facility]     [facility]    Wears glasses        Past Surgical History:   Past Surgical History:   Procedure Laterality Date    ANGIOPLASTY       LAD    CARDIAC CATHETERIZATION      CARDIAC CATHETERIZATION N/A 2021    Procedure: Cardiac catheterization;  Surgeon: Jeannette Ackerman MD;  Location: [facility]; Service: Cardiology    CARDIAC CATHETERIZATION N/A 2021    Procedure: Cardiac Coronary Angiogram;  Surgeon: Jeannette Ackerman MD;  Location: [facility]; Service: Cardiology    CARDIAC CATHETERIZATION N/A 2021    Procedure: Cardiac pci;  Surgeon: Jeannette Ackerman MD;  Location: [facility]; Service: Cardiology    GA REMOVE ARMPIT LYMPH NODES SUPERFIC Right 2018    Procedure: EXCISION BIOPSY LYMPH NODE AXILLARY RIGHT;  Surgeon: Kavita Perkins MD;  Location: [facility];  Service: General         Family History:  Family History   Problem Relation Age of Onset    Heart attack Father     Heart disease Brother          Social History:    Social History     Substance and Sexual Activity   Alcohol Use Never    Alcohol/week: 0 0 standard drinks     Social History     Substance and Sexual Activity   Drug Use No     Social History     Tobacco Use   Smoking Status Former Smoker    Quit date: 2008    Years since quittin 1   Smokeless Tobacco Never Used       Home Medications:   Prior to Admission medications    Medication Sig Start Date End Date Taking?  Authorizing Provider   acetaminophen (TYLENOL) 500 mg tablet Take 500 mg by mouth every 6 (six) hours as needed for mild pain For the pain around eyes    Yes Historical Provider, MD   aspirin 81 MG tablet Take 81 mg by mouth daily  Yes Historical Provider, MD   atorvastatin (LIPITOR) 40 mg tablet Take 0 5 tablets (20 mg total) by mouth daily 9/29/20  Yes Jonathon Garza MD   clonazePAM (KlonoPIN) 1 mg tablet TK 1/2 T PO BID PRN 8/22/19  Yes Historical Provider, MD   clopidogrel (PLAVIX) 75 mg tablet Take 1 tablet (75 mg total) by mouth daily 12/28/21  Yes Tiffanie Hoskins PA-C   ezetimibe (ZETIA) 10 mg tablet TAKE 1 TABLET(10 MG) BY MOUTH DAILY 9/15/21  Yes Jonathon Garza MD   gabapentin (NEURONTIN) 600 MG tablet Take 600 mg by mouth 3 (three) times a day  Yes Historical Provider, MD   isosorbide mononitrate (IMDUR) 30 mg 24 hr tablet Take 1 tablet (30 mg total) by mouth daily 12/29/21  Yes Tiffanie Hoskins PA-C   lisinopril (ZESTRIL) 40 mg tablet Take 0 5 tablets (20 mg total) by mouth daily 10/5/21  Yes Jonathon Garza MD   methocarbamol (ROBAXIN) 750 mg tablet Take 750 mg by mouth 3 (three) times a day  Yes Historical Provider, MD   mirtazapine (REMERON) 45 MG tablet Take 45 mg by mouth daily at bedtime  Yes Historical Provider, MD   nitroglycerin (NITROSTAT) 0 4 mg SL tablet PLACE 1 TABLET UNDER THE TONGUE EVERY 5 MINS AS NEEDED FOR CHEST PAIN 10/31/21  Yes Jonathon Garza MD   omeprazole (PriLOSEC) 20 mg delayed release capsule Take 20 mg by mouth 2 (two) times a day  Yes Historical Provider, MD   QUEtiapine (SEROquel) 100 mg tablet Take 100 mg by mouth daily at bedtime  Yes Historical Provider, MD   rOPINIRole (REQUIP) 1 mg tablet Take 1 mg by mouth daily  Yes Historical Provider, MD   traMADol (ULTRAM) 50 mg tablet Take 50 mg by mouth daily     Yes Historical Provider, MD   triamcinolone (KENALOG) 0 1 % cream Apply topically 2 (two) times a day     Yes Historical Provider, MD   verapamil (CALAN) 80 mg tablet Take 1 tablet (80 mg total) by mouth every 12 (twelve) hours 9/29/20  Yes Jonathon Garza MD   VIIBRYD 40 MG tablet Take 40 mg by mouth daily with breakfast   1/22/18  Yes Historical Provider, MD       Allergies:  No Known Allergies    Review of Systems    Review of Systems   Constitutional: Positive for activity change  Negative for chills, diaphoresis and fatigue  HENT: Negative for dental problem, nosebleeds and rhinorrhea  Eyes: Negative for pain and visual disturbance  Respiratory: Positive for shortness of breath  Negative for cough and chest tightness  Cardiovascular: Positive for chest pain  Negative for palpitations and leg swelling  Gastrointestinal: Negative for blood in stool, nausea and vomiting  Genitourinary: Negative for dysuria, flank pain and hematuria  Musculoskeletal: Negative for arthralgias, gait problem and joint swelling  Skin: Negative for color change, pallor and rash  Neurological: Positive for light-headedness  Negative for dizziness, seizures, syncope, weakness and numbness  Psychiatric/Behavioral: Negative for confusion and hallucinations  The patient is not nervous/anxious  Vital Signs:     Vitals:    01/07/22 0907   Height: 5' 10" (1 778 m)       Physical Exam:   Physical Exam  Constitutional:       General: He is not in acute distress  Appearance: Normal appearance  He is not diaphoretic  HENT:      Head: Normocephalic and atraumatic  Right Ear: External ear normal       Left Ear: External ear normal       Nose: Nose normal    Eyes:      General:         Right eye: No discharge  Left eye: No discharge  Neck:      Vascular: No carotid bruit  Cardiovascular:      Rate and Rhythm: Normal rate and regular rhythm  Heart sounds: No murmur heard  No friction rub  Pulmonary:      Effort: Pulmonary effort is normal       Breath sounds: Normal breath sounds  No wheezing, rhonchi or rales  Abdominal:      General: Abdomen is flat  There is no distension  Palpations: Abdomen is soft  Tenderness: There is no abdominal tenderness  Musculoskeletal:         General: No deformity or signs of injury  Cervical back: No rigidity   No muscular tenderness  Right lower leg: No edema  Left lower leg: No edema  Skin:     General: Skin is warm and dry  Capillary Refill: Capillary refill takes less than 2 seconds  Findings: No rash  Neurological:      General: No focal deficit present  Mental Status: He is alert and oriented to person, place, and time  Psychiatric:         Mood and Affect: Mood normal          Behavior: Behavior normal          Thought Content: Thought content normal          Judgment: Judgment normal          Lab Results:               Invalid input(s): LABGLOM      Lab Results   Component Value Date    HGBA1C 5 5 09/30/2021     Lab Results   Component Value Date    TROPONINI <0 02 10/29/2018       Imaging Studies:     Cardiac Catheterization: 12/27  · Left Anterior Descending: The vessel was visualized by angiography and is large  Prox LAD lesion is 95% stenosed  Culprit lesion  JIMENA flow is 3  The lesion is type C and diffuse  The lesion was previously treated using a stent  The lesion has restenosis  · Drug-eluting stent was successfully placed  The stent used was a STENT RESOLUTE SANDOVAL RX 3 X [id number]  Stent was deployed by way of balloon expansion  · After stent placement, there was now a ostial LAD lesion of 95 % with JIMENA 3 flow  No dissection plain was seen        Echocardiogram: 12/27    Left Ventricle: Left ventricular cavity size is normal  The left ventricular ejection fraction is 55% by visual estimation  Systolic function is low normal  There is severe hypokinesis of the mid anteroseptal and apical septal walls  There is apical akinesis  There is no concentric hypertrophy  Diastolic function is mildly abnormal, consistent with grade I (abnormal) relaxation    Aortic Valve: The aortic valve is trileaflet  The leaflets are mildly calcified  There increased calcifications involving the non coronary cusp  The leaflets exhibit normal mobility    Mitral Valve: The leaflets are not thickened   There is mild calcification  The leaflets exhibit normal mobility  There is mild annular calcification  There is mild regurgitation    Tricuspid Valve: Pulmonary artery systolic pressures cannot be estimated due to lack of tricuspid regurgitation jet  Vein Mapping:  RIGHT LOWER LIMB:  The great saphenous vein is patent  from the groin to the ankle  The vein measures >2mm in caliber from the groin to the distal calf  Branches noted throughout the thigh  LEFT LOWER LIMB:  The great saphenous vein is patent  from the groin to the ankle  The vein measures >2mm in caliber from the groin to the distal calf  Carotids:  RIGHT:  There is <50% stenosis noted in the internal carotid artery  Plaque is  homogenous and smooth  Vertebral artery flow is antegrade  There is no significant subclavian artery  disease  LEFT:  There is <50% stenosis noted in the internal carotid artery  Plaque is  homogenous and smooth  Vertebral artery flow is antegrade  There is no significant subclavian artery  Disease  CXR:  No acute cardiopulmonary disease  I have personally reviewed pertinent films in PACS    Assessment:  Patient Active Problem List    Diagnosis Date Noted    NSTEMI (non-ST elevated myocardial infarction) ([facility] [address] ) 12/27/2021    Episodic cluster headache, not intractable 12/24/2021    Chronic pain syndrome 12/24/2021    Adenopathy 11/15/2018    Mixed hyperlipidemia 02/20/2018    Chest pain with high risk for cardiac etiology 01/29/2016    Primary hypertension     Old myocardial infarct     Anxiety with depression     Coronary artery disease involving native coronary artery of native heart with angina pectoris ([facility] [address] )     Anxiety      Severe coronary artery disease; Ongoing CABG workup    Plan:  Risks and benefits of coronary artery bypass grafting were discussed in detail today with the patient  They understand and wish to proceed with further workup and ultimately surgical intervention    We have ordered routine preoperative laboratory and vascular studies  Pending the results of these tests, they will be scheduled for surgery with BRIAN Burdick  on 1/13/22  Corry Calix was comfortable with our recommendations, and their questions were answered to their satisfaction  Thank you for allowing us to participate in the care of this patient  cologuard ordered by PCP 11/15/21    SIGNATURE: Radha Cavanaugh PA-C  DATE: January 7, 2022  TIME: 9:20 AM    * This note was completed in part utilizing [employer] direct voice recognition software  Grammatical errors, random word insertion, spelling mistakes, and incomplete sentences may be an occasional consequence of the system secondary to software limitations, ambient noise and hardware issues  At the time of dictation, efforts were made to edit, clarify and /or correct errors  Please read the chart carefully and recognize, using context, where substitutions have occurred  If you have any questions or concerns about the context, text or information contained within the body of this dictation, please contact myself, the provider, for further clarification

## 2022-01-07 NOTE — H&P
H&P - Cardiothoracic Surgery   Erik Metz 60 y o  male MRN: [id number]    Physician Requesting Consult: [place]ia Burton PA-C    Reason for Consult / Principal Problem: Coronary artery disease    History of Present Illness: Erik Metz is a 60y o  year old male with PMH of CAD- anterior MI 5/2009 w/ PCI/KHANH to LAD, NSTEMI 12/21 w/ unsuccessful PCI to LAD, HTN (no BB 2/2 bradycardia/depression), HLD, VT, GERD, chronic pain syndrome (gabapentin, PRN robaxin and tramadol), DDD, thoracic spondylosis s/p spinal nerve blocks, cluster HA (verapamil) arthritis, anxiety depression who was recently admitted to [facility] (12/23 -12/28), found to have NSTEMI with unsuccessful PCI to LAD  Patient was recommended to be transferred to [facility] for surgical evaluation but signed out AMA due to not wanting to wait for bed to open  Patient follows with cardiologist Dr Dianne Horta (last seen in October of 2021) recommended at that time for stress test in 6 months  Upon interview, patient relays story above  Reports he has been experiencing intermittent sharp L anterior CP with radiation to left shoulder and neck for the past year, associated with SOB  He states he has had a total of ~3 episodes of CP, resolved in 10 min  Occurring both at rest and minimal exertion  Patient reports he lives a relatively sedentary lifestyle to avoid developing CP  He denies other associated symptoms  Does report episodes of lightheadedness since discharge from [facility] which he attributes to his plavix  He denies syncope, PND, or LE edema  He does report orthopnea (sleeps with 5 pillows at night)  Patient lives with wife and 3 kids  He is retired  No pertinent surgical history  FH pertinent for father who passed from MI at age of 64 and brother with CAD s/p CABG ([place])  30 pk/yr hx (quit in 2009), denies alcohol or tobacco use  Dentures  Covid vaccinated       Past Medical History:  Past Medical History:   Diagnosis Date    Angina pectoris ([facility] [address] )  Anxiety     Arthritis     Back pain     CAD (coronary artery disease)     DDD (degenerative disc disease), lumbosacral     Depressed     Full dentures     GERD (gastroesophageal reflux disease)     Hyperlipidemia     Hypertension     MI (myocardial infarction) ([facility] [address] )     2009    Wears glasses        Past Surgical History:   Past Surgical History:   Procedure Laterality Date    ANGIOPLASTY       LAD    CARDIAC CATHETERIZATION      CARDIAC CATHETERIZATION N/A 2021    Procedure: Cardiac catheterization;  Surgeon: Robson De Anda MD;  Location: [facility]; Service: Cardiology    CARDIAC CATHETERIZATION N/A 2021    Procedure: Cardiac Coronary Angiogram;  Surgeon: Robson De Anda MD;  Location: [facility]; Service: Cardiology    CARDIAC CATHETERIZATION N/A 2021    Procedure: Cardiac pci;  Surgeon: Robson De Anda MD;  Location: [facility]; Service: Cardiology    [place] REMOVE ARMPIT LYMPH NODES SUPERFIC Right 2018    Procedure: EXCISION BIOPSY LYMPH NODE AXILLARY RIGHT;  Surgeon: Shakila Beltran MD;  Location: [facility];  Service: General         Family History:  Family History   Problem Relation Age of Onset    Heart attack Father     Heart disease Brother          Social History:    Social History     Substance and Sexual Activity   Alcohol Use Never    Alcohol/week: 0 0 standard drinks     Social History     Substance and Sexual Activity   Drug Use No     Social History     Tobacco Use   Smoking Status Former Smoker    Quit date: 2008    Years since quittin 1   Smokeless Tobacco Never Used       Home Medications:   Prior to Admission medications    Medication Sig Start Date End Date Taking? Authorizing Provider   acetaminophen (TYLENOL) 500 mg tablet Take 500 mg by mouth every 6 (six) hours as needed for mild pain For the pain around eyes    Yes Historical Provider, MD   aspirin 81 MG tablet Take 81 mg by mouth daily     Yes Historical Provider, MD   atorvastatin (LIPITOR) 40 mg tablet Take 0 5 tablets (20 mg total) by mouth daily 9/29/20  Yes Ivette Lee MD   clonazePAM (KlonoPIN) 1 mg tablet TK 1/2 T PO BID PRN 8/22/19  Yes Historical Provider, MD   clopidogrel (PLAVIX) 75 mg tablet Take 1 tablet (75 mg total) by mouth daily 12/28/21  Yes Frann Severe, PA-C   ezetimibe (ZETIA) 10 mg tablet TAKE 1 TABLET(10 MG) BY MOUTH DAILY 9/15/21  Yes Ivette Lee MD   gabapentin (NEURONTIN) 600 MG tablet Take 600 mg by mouth 3 (three) times a day  Yes Historical Provider, MD   isosorbide mononitrate (IMDUR) 30 mg 24 hr tablet Take 1 tablet (30 mg total) by mouth daily 12/29/21  Yes Frann Severe, PA-C   lisinopril (ZESTRIL) 40 mg tablet Take 0 5 tablets (20 mg total) by mouth daily 10/5/21  Yes Ivette Lee MD   methocarbamol (ROBAXIN) 750 mg tablet Take 750 mg by mouth 3 (three) times a day  Yes Historical Provider, MD   mirtazapine (REMERON) 45 MG tablet Take 45 mg by mouth daily at bedtime  Yes Historical Provider, MD   nitroglycerin (NITROSTAT) 0 4 mg SL tablet PLACE 1 TABLET UNDER THE TONGUE EVERY 5 MINS AS NEEDED FOR CHEST PAIN 10/31/21  Yes Ivette Lee MD   omeprazole (PriLOSEC) 20 mg delayed release capsule Take 20 mg by mouth 2 (two) times a day  Yes Historical Provider, MD   QUEtiapine (SEROquel) 100 mg tablet Take 100 mg by mouth daily at bedtime  Yes Historical Provider, MD   rOPINIRole (REQUIP) 1 mg tablet Take 1 mg by mouth daily  Yes Historical Provider, MD   traMADol (ULTRAM) 50 mg tablet Take 50 mg by mouth daily     Yes Historical Provider, MD   triamcinolone (KENALOG) 0 1 % cream Apply topically 2 (two) times a day     Yes Historical Provider, MD   verapamil (CALAN) 80 mg tablet Take 1 tablet (80 mg total) by mouth every 12 (twelve) hours 9/29/20  Yes Ivette Lee MD   VIIBRYD 40 MG tablet Take 40 mg by mouth daily with breakfast   1/22/18  Yes Historical Provider, MD       Allergies:  No Known Allergies    Review of Systems    Review of Systems   Constitutional: Positive for activity change  Negative for chills, diaphoresis and fatigue  HENT: Negative for dental problem, nosebleeds and rhinorrhea  Eyes: Negative for pain and visual disturbance  Respiratory: Positive for shortness of breath  Negative for cough and chest tightness  Cardiovascular: Positive for chest pain  Negative for palpitations and leg swelling  Gastrointestinal: Negative for blood in stool, nausea and vomiting  Genitourinary: Negative for dysuria, flank pain and hematuria  Musculoskeletal: Negative for arthralgias, gait problem and joint swelling  Skin: Negative for color change, pallor and rash  Neurological: Positive for light-headedness  Negative for dizziness, seizures, syncope, weakness and numbness  Psychiatric/Behavioral: Negative for confusion and hallucinations  The patient is not nervous/anxious  Vital Signs:     Vitals:    01/07/22 0907   Height: 5' 10" (1 778 m)       Physical Exam:   Physical Exam  Constitutional:       General: He is not in acute distress  Appearance: Normal appearance  He is not diaphoretic  HENT:      Head: Normocephalic and atraumatic  Right Ear: External ear normal       Left Ear: External ear normal       Nose: Nose normal    Eyes:      General:         Right eye: No discharge  Left eye: No discharge  Neck:      Vascular: No carotid bruit  Cardiovascular:      Rate and Rhythm: Normal rate and regular rhythm  Heart sounds: No murmur heard  No friction rub  Pulmonary:      Effort: Pulmonary effort is normal       Breath sounds: Normal breath sounds  No wheezing, rhonchi or rales  Abdominal:      General: Abdomen is flat  There is no distension  Palpations: Abdomen is soft  Tenderness: There is no abdominal tenderness  Musculoskeletal:         General: No deformity or signs of injury  Cervical back: No rigidity  No muscular tenderness        Right lower leg: No edema  Left lower leg: No edema  Skin:     General: Skin is warm and dry  Capillary Refill: Capillary refill takes less than 2 seconds  Findings: No rash  Neurological:      General: No focal deficit present  Mental Status: He is alert and oriented to person, place, and time  Psychiatric:         Mood and Affect: Mood normal          Behavior: Behavior normal          Thought Content: Thought content normal          Judgment: Judgment normal          Lab Results:               Invalid input(s): LABGLOM      Lab Results   Component Value Date    HGBA1C 5 5 09/30/2021     Lab Results   Component Value Date    TROPONINI <0 02 10/29/2018       Imaging Studies:     Cardiac Catheterization: 12/27  · Left Anterior Descending: The vessel was visualized by angiography and is large  Prox LAD lesion is 95% stenosed  Culprit lesion  JIMENA flow is 3  The lesion is type C and diffuse  The lesion was previously treated using a stent  The lesion has restenosis  · Drug-eluting stent was successfully placed  The stent used was a STENT RESOLUTE SANDOVAL RX 3 X [id number]  Stent was deployed by way of balloon expansion  · After stent placement, there was now a ostial LAD lesion of 95 % with JIMENA 3 flow  No dissection plain was seen        Echocardiogram: 12/27    Left Ventricle: Left ventricular cavity size is normal  The left ventricular ejection fraction is 55% by visual estimation  Systolic function is low normal  There is severe hypokinesis of the mid anteroseptal and apical septal walls  There is apical akinesis  There is no concentric hypertrophy  Diastolic function is mildly abnormal, consistent with grade I (abnormal) relaxation    Aortic Valve: The aortic valve is trileaflet  The leaflets are mildly calcified  There increased calcifications involving the non coronary cusp  The leaflets exhibit normal mobility    Mitral Valve: The leaflets are not thickened  There is mild calcification   The leaflets exhibit normal mobility  There is mild annular calcification  There is mild regurgitation    Tricuspid Valve: Pulmonary artery systolic pressures cannot be estimated due to lack of tricuspid regurgitation jet  Vein Mapping:  RIGHT LOWER LIMB:  The great saphenous vein is patent  from the groin to the ankle  The vein measures >2mm in caliber from the groin to the distal calf  Branches noted throughout the thigh  LEFT LOWER LIMB:  The great saphenous vein is patent  from the groin to the ankle  The vein measures >2mm in caliber from the groin to the distal calf  Carotids:  RIGHT:  There is <50% stenosis noted in the internal carotid artery  Plaque is  homogenous and smooth  Vertebral artery flow is antegrade  There is no significant subclavian artery  disease  LEFT:  There is <50% stenosis noted in the internal carotid artery  Plaque is  homogenous and smooth  Vertebral artery flow is antegrade  There is no significant subclavian artery  Disease  CXR:  No acute cardiopulmonary disease  I have personally reviewed pertinent films in PACS    Assessment:  Patient Active Problem List    Diagnosis Date Noted    NSTEMI (non-ST elevated myocardial infarction) ([facility] [address] ) 12/27/2021    Episodic cluster headache, not intractable 12/24/2021    Chronic pain syndrome 12/24/2021    Adenopathy 11/15/2018    Mixed hyperlipidemia 02/20/2018    Chest pain with high risk for cardiac etiology 01/29/2016    Primary hypertension     Old myocardial infarct     Anxiety with depression     Coronary artery disease involving native coronary artery of native heart with angina pectoris ([facility] [address] )     Anxiety      Severe coronary artery disease; Ongoing CABG workup    Plan:  Risks and benefits of coronary artery bypass grafting were discussed in detail today with the patient  They understand and wish to proceed with further workup and ultimately surgical intervention    We have ordered routine preoperative laboratory and vascular studies  Pending the results of these tests, they will be scheduled for surgery with BRIAN Curtis  on 1/13/22  Corry Calix was comfortable with our recommendations, and their questions were answered to their satisfaction  Thank you for allowing us to participate in the care of this patient  cologuard ordered by PCP 11/15/21    SIGNATURE: Renate Hidalgo PA-C  DATE: January 7, 2022  TIME: 9:20 AM    * This note was completed in part utilizing eduPad direct voice recognition software  Grammatical errors, random word insertion, spelling mistakes, and incomplete sentences may be an occasional consequence of the system secondary to software limitations, ambient noise and hardware issues  At the time of dictation, efforts were made to edit, clarify and /or correct errors  Please read the chart carefully and recognize, using context, where substitutions have occurred  If you have any questions or concerns about the context, text or information contained within the body of this dictation, please contact myself, the provider, for further clarification

## 2022-01-07 NOTE — LETTER
January 7, 2022     Jennifer Ly, [address] [facility] [address]    Patient: Joseph Velasco   YOB: 1965   Date of Visit: 1/7/2022       Dear Dr Adrien Lacy:    Thank you for referring Joseph Velasco to me for evaluation  Below are my notes for this consultation  If you have questions, please do not hesitate to call me  I look forward to following your patient along with you  Sincerely,        Radha Cadena MD        CC: Tenny Kil, DO Ula Roe, MD Claudeen Southerly, PA-C  1/7/2022 10:06 AM  Attested  Consultation - Cardiothoracic Surgery   Corry Calix 60 y o  male MRN: [id number]    Physician Requesting Consult: Luis Enrique Lilly PA-C    Reason for Consult / Principal Problem: Coronary artery disease    History of Present Illness: Joseph Velasco is a 60y o  year old male with PMH of CAD- anterior MI 5/2009 w/ PCI/KHANH to LAD, NSTEMI 12/21 w/ unsuccessful PCI to LAD, HTN (no BB 2/2 bradycardia/depression), HLD, VT, GERD, chronic pain syndrome (gabapentin, PRN robaxin and tramadol), DDD, thoracic spondylosis s/p spinal nerve blocks, cluster HA (verapamil) arthritis, anxiety depression who was recently admitted to [facility] (12/23 -12/28), found to have NSTEMI with unsuccessful PCI to LAD  Patient was recommended to be transferred to [facility] for surgical evaluation but signed out AMA due to not wanting to wait for bed to open  Patient follows with cardiologist Dr Clarke Palacios (last seen in October of 2021) recommended at that time for stress test in 6 months  Upon interview, patient relays story above  Reports he has been experiencing intermittent sharp L anterior CP with radiation to left shoulder and neck for the past year, associated with SOB  He states he has had a total of ~3 episodes of CP, resolved in 10 min  Occurring both at rest and minimal exertion  Patient reports he lives a relatively sedentary lifestyle to avoid developing CP  He denies other associated symptoms   Does report episodes of lightheadedness since discharge from [facility] which he attributes to his plavix  He denies syncope, PND, or LE edema  He does report orthopnea (sleeps with 5 pillows at night)  Patient lives with wife and 3 kids  He is retired  No pertinent surgical history  FH pertinent for father who passed from MI at age of 64 and brother with CAD s/p CABG ([place])  30 pk/yr hx (quit in 2009), denies alcohol or tobacco use  Dentures  Covid vaccinated  Past Medical History:  Past Medical History:   Diagnosis Date    Angina pectoris ([facility] [place] [facility] )     Anxiety     Arthritis     Back pain     CAD (coronary artery disease)     DDD (degenerative disc disease), lumbosacral     Depressed     Full dentures     GERD (gastroesophageal reflux disease)     Hyperlipidemia     Hypertension     MI (myocardial infarction) ([facility] [place] [facility] )     2009    Wears glasses        Past Surgical History:   Past Surgical History:   Procedure Laterality Date    ANGIOPLASTY  2009     LAD    CARDIAC CATHETERIZATION      CARDIAC CATHETERIZATION N/A 12/27/2021    Procedure: Cardiac catheterization;  Surgeon: Jennifer Ly MD;  Location: [facility]; Service: Cardiology    CARDIAC CATHETERIZATION N/A 12/27/2021    Procedure: Cardiac Coronary Angiogram;  Surgeon: Jennifer Ly MD;  [facility]: [facility]; Service: Cardiology    CARDIAC CATHETERIZATION N/A 12/27/2021    Procedure: Cardiac pci;  Surgeon: Jennifer Ly MD;  Location: [facility];   Service: Cardiology    IN REMOVE ARMPIT LYMPH NODES SUPERFIC Right 11/21/2018    Procedure: EXCISION BIOPSY LYMPH NODE AXILLARY RIGHT;  Surgeon: Juan Hobson MD;  Location: [facility];  Service: General         Family History:  Family History   Problem Relation Age of Onset    Heart attack Father     Heart disease Brother          Social History:    Social History     Substance and Sexual Activity   Alcohol Use Never    Alcohol/week: 0 0 standard drinks Social History     Substance and Sexual Activity   Drug Use No     Social History     Tobacco Use   Smoking Status Former Smoker    Quit date: 2008    Years since quittin 1   Smokeless Tobacco Never Used       Home Medications:   Prior to Admission medications    Medication Sig Start Date End Date Taking? Authorizing Provider   acetaminophen (TYLENOL) 500 mg tablet Take 500 mg by mouth every 6 (six) hours as needed for mild pain For the pain around eyes    Yes Historical Provider, MD   aspirin 81 MG tablet Take 81 mg by mouth daily  Yes Historical Provider, MD   atorvastatin (LIPITOR) 40 mg tablet Take 0 5 tablets (20 mg total) by mouth daily 20  Yes Tabatha Singh MD   clonazePAM (KlonoPIN) 1 mg tablet TK  T PO BID PRN 19  Yes Historical Provider, MD   clopidogrel (PLAVIX) 75 mg tablet Take 1 tablet (75 mg total) by mouth daily 21  Yes Akua Delgadillo PA-C   ezetimibe (ZETIA) 10 mg tablet TAKE 1 TABLET(10 MG) BY MOUTH DAILY 9/15/21  Yes Tabatha Singh MD   gabapentin (NEURONTIN) 600 MG tablet Take 600 mg by mouth 3 (three) times a day  Yes Historical Provider, MD   isosorbide mononitrate (IMDUR) 30 mg 24 hr tablet Take 1 tablet (30 mg total) by mouth daily 21  Yes Akua Delgadillo PA-C   lisinopril (ZESTRIL) 40 mg tablet Take 0 5 tablets (20 mg total) by mouth daily 10/5/21  Yes Tabatha Singh MD   methocarbamol (ROBAXIN) 750 mg tablet Take 750 mg by mouth 3 (three) times a day  Yes Historical Provider, MD   mirtazapine (REMERON) 45 MG tablet Take 45 mg by mouth daily at bedtime  Yes Historical Provider, MD   nitroglycerin (NITROSTAT) 0 4 mg SL tablet PLACE 1 TABLET UNDER THE TONGUE EVERY 5 MINS AS NEEDED FOR CHEST PAIN 10/31/21  Yes Tabatha Singh MD   omeprazole (PriLOSEC) 20 mg delayed release capsule Take 20 mg by mouth 2 (two) times a day  Yes Historical Provider, MD   QUEtiapine (SEROquel) 100 mg tablet Take 100 mg by mouth daily at bedtime     Yes Historical Provider, MD   rOPINIRole (REQUIP) 1 mg tablet Take 1 mg by mouth daily  Yes Historical Provider, MD   traMADol (ULTRAM) 50 mg tablet Take 50 mg by mouth daily     Yes Historical Provider, MD   triamcinolone (KENALOG) 0 1 % cream Apply topically 2 (two) times a day  Yes Historical Provider, MD   verapamil (CALAN) 80 mg tablet Take 1 tablet (80 mg total) by mouth every 12 (twelve) hours 9/29/20  Yes Sofi Concepcion MD   VIIBRYD 40 MG tablet Take 40 mg by mouth daily with breakfast   1/22/18  Yes Historical Provider, MD       Allergies:  No Known Allergies    Review of Systems    Review of Systems   Constitutional: Positive for activity change  Negative for chills, diaphoresis and fatigue  HENT: Negative for dental problem, nosebleeds and rhinorrhea  Eyes: Negative for pain and visual disturbance  Respiratory: Positive for shortness of breath  Negative for cough and chest tightness  Cardiovascular: Positive for chest pain  Negative for palpitations and leg swelling  Gastrointestinal: Negative for blood in stool, nausea and vomiting  Genitourinary: Negative for dysuria, flank pain and hematuria  Musculoskeletal: Negative for arthralgias, gait problem and joint swelling  Skin: Negative for color change, pallor and rash  Neurological: Positive for light-headedness  Negative for dizziness, seizures, syncope, weakness and numbness  Psychiatric/Behavioral: Negative for confusion and hallucinations  The patient is not nervous/anxious  Vital Signs:     Vitals:    01/07/22 0907   Height: 5' 10" (1 778 m)       Physical Exam:   Physical Exam  Constitutional:       General: He is not in acute distress  Appearance: Normal appearance  He is not diaphoretic  HENT:      Head: Normocephalic and atraumatic  Right Ear: External ear normal       Left Ear: External ear normal       Nose: Nose normal    Eyes:      General:         Right eye: No discharge  Left eye: No discharge     Neck: Vascular: No carotid bruit  Cardiovascular:      Rate and Rhythm: Normal rate and regular rhythm  Heart sounds: No murmur heard  No friction rub  Pulmonary:      Effort: Pulmonary effort is normal       Breath sounds: Normal breath sounds  No wheezing, rhonchi or rales  Abdominal:      General: Abdomen is flat  There is no distension  Palpations: Abdomen is soft  Tenderness: There is no abdominal tenderness  Musculoskeletal:         General: No deformity or signs of injury  Cervical back: No rigidity  No muscular tenderness  Right lower leg: No edema  Left lower leg: No edema  Skin:     General: Skin is warm and dry  Capillary Refill: Capillary refill takes less than 2 seconds  Findings: No rash  Neurological:      General: No focal deficit present  Mental Status: He is alert and oriented to person, place, and time  Psychiatric:         Mood and Affect: Mood normal          Behavior: Behavior normal          Thought Content: Thought content normal          Judgment: Judgment normal          Lab Results:               Invalid input(s): LABGLOM      Lab Results   Component Value Date    HGBA1C 5 5 09/30/2021     Lab Results   Component Value Date    TROPONINI <0 02 10/29/2018       Imaging Studies:     Cardiac Catheterization: 12/27  · Left Anterior Descending: The vessel was visualized by angiography and is large  Prox LAD lesion is 95% stenosed  Culprit lesion  JIMENA flow is 3  The lesion is type C and diffuse  The lesion was previously treated using a stent  The lesion has restenosis  · Drug-eluting stent was successfully placed  The stent used was a STENT RESOLUTE SANDOVAL RX 3 X [id number]  Stent was deployed by way of balloon expansion  · After stent placement, there was now a ostial LAD lesion of 95 % with JIMENA 3 flow   No dissection plain was seen        Echocardiogram: 12/27    Left Ventricle: Left ventricular cavity size is normal  The left ventricular ejection fraction is 55% by visual estimation  Systolic function is low normal  There is severe hypokinesis of the mid anteroseptal and apical septal walls  There is apical akinesis  There is no concentric hypertrophy  Diastolic function is mildly abnormal, consistent with grade I (abnormal) relaxation    Aortic Valve: The aortic valve is trileaflet  The leaflets are mildly calcified  There increased calcifications involving the non coronary cusp  The leaflets exhibit normal mobility    Mitral Valve: The leaflets are not thickened  There is mild calcification  The leaflets exhibit normal mobility  There is mild annular calcification  There is mild regurgitation    Tricuspid Valve: Pulmonary artery systolic pressures cannot be estimated due to lack of tricuspid regurgitation jet  Vein Mapping:  RIGHT LOWER LIMB:  The great saphenous vein is patent  from the groin to the ankle  The vein measures >2mm in caliber from the groin to the distal calf  Branches noted throughout the thigh  LEFT LOWER LIMB:  The great saphenous vein is patent  from the groin to the ankle  The vein measures >2mm in caliber from the groin to the distal calf  Carotids:  RIGHT:  There is <50% stenosis noted in the internal carotid artery  Plaque is  homogenous and smooth  Vertebral artery flow is antegrade  There is no significant subclavian artery  disease  LEFT:  There is <50% stenosis noted in the internal carotid artery  Plaque is  homogenous and smooth  Vertebral artery flow is antegrade  There is no significant subclavian artery  Disease  CXR:  No acute cardiopulmonary disease      I have personally reviewed pertinent films in PACS    Assessment:  Patient Active Problem List    Diagnosis Date Noted    [facility] 12/27/2021    Episodic cluster headache, not intractable 12/24/2021    Chronic pain syndrome 12/24/2021    Adenopathy 11/15/2018    Mixed hyperlipidemia 02/20/2018    Chest pain with high risk for cardiac etiology 01/29/2016    Primary hypertension     Old myocardial infarct     Anxiety with depression     Coronary artery disease involving native coronary artery of native heart with angina pectoris ([address] )     Anxiety      Severe coronary artery disease; Ongoing CABG workup    Plan:  Risks and benefits of coronary artery bypass grafting were discussed in detail today with the patient  They understand and wish to proceed with further workup and ultimately surgical intervention  We have ordered routine preoperative laboratory and vascular studies  Pending the results of these tests, they will be scheduled for surgery with BRIAN Lemos  on 1/13/22  Edmundogen Calix was comfortable with our recommendations, and their questions were answered to their satisfaction  Thank you for allowing us to participate in the care of this patient  cologuard ordered by PCP 11/15/21    SIGNATURE: Lazara Raines PA-C  DATE: January 7, 2022  TIME: 9:20 AM    * This note was completed in part utilizing [employer] direct voice recognition software  Grammatical errors, random word insertion, spelling mistakes, and incomplete sentences may be an occasional consequence of the system secondary to software limitations, ambient noise and hardware issues  At the time of dictation, efforts were made to edit, clarify and /or correct errors  Please read the chart carefully and recognize, using context, where substitutions have occurred  If you have any questions or concerns about the context, text or information contained within the body of this dictation, please contact myself, the provider, for further clarification  Attestation signed by Paresh Stephens MD at 1/7/2022 10:06 AM:  Pt seen and examined with PA  I agree with the above assessment and plan  It was my pleasure to evaluate Shirlene Osborne today for Coronary artery disease    He is referred for consideration of coronary artery bypass grafting  I reviewed all of the available testing and I had a lengthy discussion with the patient and have recommended coronary artery bypass grafting  He has PCI in 2009 and was having chest pain  A stress test was planned after a visit with his cardiologist but was not scheduled until the spring for some reason  In the interim, he sustained an MI and sought emergency care  A cath showed LAD ISR and he underwent PCI  This resulted in plaque shift and he was left with a 95% ostial LAD stenosis  Transfer for CABG was recommended but patient preferred outpatient surgical evaluation  He continues to have daily symptoms  I have recommended CABG x1 with a LIMA to LAD graft  He has mild pectus excavatum deformity, therefore he needs a preoperative CT scan  Routine preoperative testing has been ordered to include CT scan  Informed consent was obtained  His other preoperative studies show no significant abnormalities        Juan José Young MD  DATE: January 7, 2022  TIME: 10:01 AM

## 2022-01-07 NOTE — PROGRESS NOTES
Consultation - Cardiothoracic Surgery   [facility] 60 y o  male MRN: [id number]    Physician Requesting Consult: Ashley Simons PA-C    Reason for Consult / Principal Problem: Coronary artery disease    History of Present Illness: [facility] is a 60y o  year old male with PMH of CAD- anterior MI 5/2009 w/ PCI/KHANH to LAD, NSTEMI 12/21 w/ unsuccessful PCI to LAD, HTN (no BB 2/2 bradycardia/depression), HLD, VT, GERD, chronic pain syndrome (gabapentin, PRN robaxin and tramadol), DDD, thoracic spondylosis s/p spinal nerve blocks, cluster HA (verapamil) arthritis, anxiety depression who was recently admitted to [facility] (12/23 -12/28), found to have NSTEMI with unsuccessful PCI to LAD  Patient was recommended to be transferred to [facility] for surgical evaluation but signed out AMA due to not wanting to wait for bed to open  Patient follows with cardiologist Dr Lex Dumont (last seen in October of 2021) recommended at that time for stress test in 6 months  Upon interview, patient relays story above  Reports he has been experiencing intermittent sharp L anterior CP with radiation to left shoulder and neck for the past year, associated with SOB  He states he has had a total of ~3 episodes of CP, resolved in 10 min  Occurring both at rest and minimal exertion  Patient reports he lives a relatively sedentary lifestyle to avoid developing CP  He denies other associated symptoms  Does report episodes of lightheadedness since discharge from [facility] which he attributes to his plavix  He denies syncope, PND, or LE edema  He does report orthopnea (sleeps with 5 pillows at night)  Patient lives with wife and 3 kids  He is retired  No pertinent surgical history  FH pertinent for father who passed from MI at age of 64 and brother with CAD s/p CABG ([place])  30 pk/yr hx (quit in 2009), denies alcohol or tobacco use  Dentures  Covid vaccinated       Past Medical History:  Past Medical History:   Diagnosis Date    Angina pectoris [facility] [address] )     Anxiety     Arthritis     Back pain     CAD (coronary artery disease)     DDD (degenerative disc disease), lumbosacral     Depressed     Full dentures     GERD (gastroesophageal reflux disease)     Hyperlipidemia     Hypertension     [facility]     [facility]    Wears glasses        Past Surgical History:   Past Surgical History:   Procedure Laterality Date    ANGIOPLASTY       LAD    CARDIAC CATHETERIZATION      CARDIAC CATHETERIZATION N/A 2021    Procedure: Cardiac catheterization;  Surgeon: Faiza Durant MD;  Location: [facility] CARDIAC CATH LAB; Service: Cardiology    CARDIAC CATHETERIZATION N/A 2021    Procedure: Cardiac Coronary Angiogram;  Surgeon: Faiza Durant MD;  [facility]: [facility]; Service: Cardiology    CARDIAC CATHETERIZATION N/A 2021    Procedure: Cardiac pci;  Surgeon: Faiza Durant MD;  [facility]: [facility]; Service: Cardiology    FL REMOVE ARMPIT LYMPH NODES SUPERFIC Right 2018    Procedure: EXCISION BIOPSY LYMPH NODE AXILLARY RIGHT;  Surgeon: Virginia Guerrero MD;  Location: [facility] Main OR;  Service: General         Family History:  Family History   Problem Relation Age of Onset    Heart attack Father     Heart disease Brother          Social History:    Social History     Substance and Sexual Activity   Alcohol Use Never    Alcohol/week: 0 0 standard drinks     Social History     Substance and Sexual Activity   Drug Use No     Social History     Tobacco Use   Smoking Status Former Smoker    Quit date: 2008    Years since quittin 1   Smokeless Tobacco Never Used       Home Medications:   Prior to Admission medications    Medication Sig Start Date End Date Taking?  Authorizing Provider   acetaminophen (TYLENOL) 500 mg tablet Take 500 mg by mouth every 6 (six) hours as needed for mild pain For the pain around eyes    Yes Historical Provider, MD   aspirin 81 MG tablet Take 81 mg by mouth daily  Yes Historical Provider, MD   atorvastatin (LIPITOR) 40 mg tablet Take 0 5 tablets (20 mg total) by mouth daily 9/29/20  Yes Jensen Cespedes MD   clonazePAM (KlonoPIN) 1 mg tablet TK 1/2 T PO BID PRN 8/22/19  Yes Historical Provider, MD   clopidogrel (PLAVIX) 75 mg tablet Take 1 tablet (75 mg total) by mouth daily 12/28/21  Yes Vivienne Reynoso PA-C   ezetimibe (ZETIA) 10 mg tablet TAKE 1 TABLET(10 MG) BY MOUTH DAILY 9/15/21  Yes Jensen Cespedes MD   gabapentin (NEURONTIN) 600 MG tablet Take 600 mg by mouth 3 (three) times a day  Yes Historical Provider, MD   isosorbide mononitrate (IMDUR) 30 mg 24 hr tablet Take 1 tablet (30 mg total) by mouth daily 12/29/21  Yes Vivienne Reynoso PA-C   lisinopril (ZESTRIL) 40 mg tablet Take 0 5 tablets (20 mg total) by mouth daily 10/5/21  Yes Jensen Cespedes MD   methocarbamol (ROBAXIN) 750 mg tablet Take 750 mg by mouth 3 (three) times a day  Yes Historical Provider, MD   mirtazapine (REMERON) 45 MG tablet Take 45 mg by mouth daily at bedtime  Yes Historical Provider, MD   nitroglycerin (NITROSTAT) 0 4 mg SL tablet PLACE 1 TABLET UNDER THE TONGUE EVERY 5 MINS AS NEEDED FOR CHEST PAIN 10/31/21  Yes Jensen Cespedes MD   omeprazole (PriLOSEC) 20 mg delayed release capsule Take 20 mg by mouth 2 (two) times a day  Yes Historical Provider, MD   QUEtiapine (SEROquel) 100 mg tablet Take 100 mg by mouth daily at bedtime  Yes Historical Provider, MD   rOPINIRole (REQUIP) 1 mg tablet Take 1 mg by mouth daily  Yes Historical Provider, MD   traMADol (ULTRAM) 50 mg tablet Take 50 mg by mouth daily     Yes Historical Provider, MD   triamcinolone (KENALOG) 0 1 % cream Apply topically 2 (two) times a day     Yes Historical Provider, MD   verapamil (CALAN) 80 mg tablet Take 1 tablet (80 mg total) by mouth every 12 (twelve) hours 9/29/20  Yes Jensen Cespedes MD   VIIBRYD 40 MG tablet Take 40 mg by mouth daily with breakfast   1/22/18  Yes Historical Provider, MD       Allergies:  No Known Allergies    Review of Systems    Review of Systems   Constitutional: Positive for activity change  Negative for chills, diaphoresis and fatigue  HENT: Negative for dental problem, nosebleeds and rhinorrhea  Eyes: Negative for pain and visual disturbance  Respiratory: Positive for shortness of breath  Negative for cough and chest tightness  Cardiovascular: Positive for chest pain  Negative for palpitations and leg swelling  Gastrointestinal: Negative for blood in stool, nausea and vomiting  Genitourinary: Negative for dysuria, flank pain and hematuria  Musculoskeletal: Negative for arthralgias, gait problem and joint swelling  Skin: Negative for color change, pallor and rash  Neurological: Positive for light-headedness  Negative for dizziness, seizures, syncope, weakness and numbness  Psychiatric/Behavioral: Negative for confusion and hallucinations  The patient is not nervous/anxious  Vital Signs:     Vitals:    01/07/22 0907   Height: 5' 10" (1 778 m)       Physical Exam:   Physical Exam  Constitutional:       General: He is not in acute distress  Appearance: Normal appearance  He is not diaphoretic  HENT:      Head: Normocephalic and atraumatic  Right Ear: External ear normal       Left Ear: External ear normal       Nose: Nose normal    Eyes:      General:         Right eye: No discharge  Left eye: No discharge  Neck:      Vascular: No carotid bruit  Cardiovascular:      Rate and Rhythm: Normal rate and regular rhythm  Heart sounds: No murmur heard  No friction rub  Pulmonary:      Effort: Pulmonary effort is normal       Breath sounds: Normal breath sounds  No wheezing, rhonchi or rales  Abdominal:      General: Abdomen is flat  There is no distension  Palpations: Abdomen is soft  Tenderness: There is no abdominal tenderness  Musculoskeletal:         General: No deformity or signs of injury  Cervical back: No rigidity   No muscular tenderness  Right lower leg: No edema  Left lower leg: No edema  Skin:     General: Skin is warm and dry  Capillary Refill: Capillary refill takes less than 2 seconds  Findings: No rash  Neurological:      General: No focal deficit present  Mental Status: He is alert and oriented to person, place, and time  Psychiatric:         Mood and Affect: Mood normal          Behavior: Behavior normal          Thought Content: Thought content normal          Judgment: Judgment normal          Lab Results:               Invalid input(s): LABGLOM      Lab Results   Component Value Date    HGBA1C 5 5 09/30/2021     Lab Results   Component Value Date    TROPONINI <0 02 10/29/2018       Imaging Studies:     Cardiac Catheterization: 12/27  · Left Anterior Descending: The vessel was visualized by angiography and is large  Prox LAD lesion is 95% stenosed  Culprit lesion  JIMENA flow is 3  The lesion is type C and diffuse  The lesion was previously treated using a stent  The lesion has restenosis  · Drug-eluting stent was successfully placed  The stent used was a STENT RESOLUTE SANDOVAL RX 3 X 38MM  Stent was deployed by way of balloon expansion  · After stent placement, there was now a ostial LAD lesion of 95 % with JIMENA 3 flow  No dissection plain was seen        Echocardiogram: 12/27    Left Ventricle: Left ventricular cavity size is normal  The left ventricular ejection fraction is 55% by visual estimation  Systolic function is low normal  There is severe hypokinesis of the mid anteroseptal and apical septal walls  There is apical akinesis  There is no concentric hypertrophy  Diastolic function is mildly abnormal, consistent with grade I (abnormal) relaxation    Aortic Valve: The aortic valve is trileaflet  The leaflets are mildly calcified  There increased calcifications involving the non coronary cusp  The leaflets exhibit normal mobility    Mitral Valve: The leaflets are not thickened   There is mild calcification  The leaflets exhibit normal mobility  There is mild annular calcification  There is mild regurgitation    Tricuspid Valve: Pulmonary artery systolic pressures cannot be estimated due to lack of tricuspid regurgitation jet  Vein Mapping:  RIGHT LOWER LIMB:  The great saphenous vein is patent  from the groin to the ankle  The vein measures >2mm in caliber from the groin to the distal calf  Branches noted throughout the thigh  LEFT LOWER LIMB:  The great saphenous vein is patent  from the groin to the ankle  The vein measures >2mm in caliber from the groin to the distal calf  Carotids:  RIGHT:  There is <50% stenosis noted in the internal carotid artery  Plaque is  homogenous and smooth  Vertebral artery flow is antegrade  There is no significant subclavian artery  disease  LEFT:  There is <50% stenosis noted in the internal carotid artery  Plaque is  homogenous and smooth  Vertebral artery flow is antegrade  There is no significant subclavian artery  Disease  CXR:  No acute cardiopulmonary disease  I have personally reviewed pertinent films in PACS    Assessment:  Patient Active Problem List    Diagnosis Date Noted    NSTEMI (non-ST elevated myocardial infarction) [facility] [address] ) 12/27/2021    Episodic cluster headache, not intractable 12/24/2021    Chronic pain syndrome 12/24/2021    Adenopathy 11/15/2018    Mixed hyperlipidemia 02/20/2018    Chest pain with high risk for cardiac etiology 01/29/2016    Primary hypertension     Old myocardial infarct     Anxiety with depression     Coronary artery disease involving native coronary artery of native heart with angina pectoris ([facility] [address] )     Anxiety      Severe coronary artery disease; Ongoing CABG workup    Plan:  Risks and benefits of coronary artery bypass grafting were discussed in detail today with the patient  They understand and wish to proceed with further workup and ultimately surgical intervention    We have ordered routine preoperative laboratory and vascular studies  Pending the results of these tests, they will be scheduled for surgery with BRIAN Fisher  on 1/13/22  Corry Calix was comfortable with our recommendations, and their questions were answered to their satisfaction  Thank you for allowing us to participate in the care of this patient  cologuard ordered by PCP 11/15/21    SIGNATURE: Merline Doles, PA-C  DATE: January 7, 2022  TIME: 9:20 AM    * This note was completed in part utilizing [employer] direct voice recognition software  Grammatical errors, random word insertion, spelling mistakes, and incomplete sentences may be an occasional consequence of the system secondary to software limitations, ambient noise and hardware issues  At the time of dictation, efforts were made to edit, clarify and /or correct errors  Please read the chart carefully and recognize, using context, where substitutions have occurred  If you have any questions or concerns about the context, text or information contained within the body of this dictation, please contact myself, the provider, for further clarification

## 2022-01-08 LAB
ABO GROUP BLD: NORMAL
BLD GP AB SCN SERPL QL: NEGATIVE
RH BLD: POSITIVE
SPECIMEN EXPIRATION DATE: NORMAL

## 2022-01-09 LAB — MRSA NOSE QL CULT: NORMAL

## 2022-01-11 NOTE — PRE-PROCEDURE INSTRUCTIONS
Pre-Surgery Instructions:   Medication Instructions    acetaminophen (TYLENOL) 500 mg tablet Patient was instructed by Physician and understands   aspirin 81 MG tablet Patient was instructed by Physician and understands   atorvastatin (LIPITOR) 40 mg tablet Patient was instructed by Physician and understands   clonazePAM (KlonoPIN) 1 mg tablet Patient was instructed by Physician and understands   clopidogrel (PLAVIX) 75 mg tablet Patient was instructed by Physician and understands   ezetimibe (ZETIA) 10 mg tablet Patient was instructed by Physician and understands   isosorbide mononitrate (IMDUR) 30 mg 24 hr tablet Patient was instructed by Physician and understands   lisinopril (ZESTRIL) 40 mg tablet Patient was instructed by Physician and understands   mirtazapine (REMERON) 45 MG tablet Patient was instructed by Physician and understands   mupirocin (BACTROBAN) 2 % nasal ointment Patient was instructed by Physician and understands   omeprazole (PriLOSEC) 20 mg delayed release capsule Patient was instructed by Physician and understands   QUEtiapine (SEROquel) 100 mg tablet Patient was instructed by Physician and understands   verapamil (CALAN) 80 mg tablet Patient was instructed by Physician and understands   VIIBRYD 40 MG tablet Patient was instructed by Physician and understands  Pt denies fever, sob, sore throat and cough  Pt verbalized understanding of shower and med instructions he received from surgeons office

## 2022-01-12 RX ORDER — HEPARIN SODIUM 1000 [USP'U]/ML
400 INJECTION, SOLUTION INTRAVENOUS; SUBCUTANEOUS ONCE
Status: CANCELLED | OUTPATIENT
Start: 2022-01-13

## 2022-01-12 RX ORDER — HEPARIN SODIUM 1000 [USP'U]/ML
10000 INJECTION, SOLUTION INTRAVENOUS; SUBCUTANEOUS ONCE
Status: CANCELLED | OUTPATIENT
Start: 2022-01-13

## 2022-01-13 ENCOUNTER — APPOINTMENT (OUTPATIENT)
Dept: NON INVASIVE DIAGNOSTICS | Facility: HOSPITAL | Age: 57
DRG: 235 | End: 2022-01-13
Attending: THORACIC SURGERY (CARDIOTHORACIC VASCULAR SURGERY)
Payer: MEDICARE

## 2022-01-13 ENCOUNTER — HOSPITAL ENCOUNTER (INPATIENT)
Facility: HOSPITAL | Age: 57
LOS: 3 days | Discharge: HOME HEALTH | DRG: 235 | End: 2022-01-16
Attending: THORACIC SURGERY (CARDIOTHORACIC VASCULAR SURGERY) | Admitting: THORACIC SURGERY (CARDIOTHORACIC VASCULAR SURGERY)
Payer: MEDICARE

## 2022-01-13 ENCOUNTER — ANESTHESIA EVENT (INPATIENT)
Dept: PERIOP | Facility: HOSPITAL | Age: 57
DRG: 235 | End: 2022-01-13
Payer: MEDICARE

## 2022-01-13 ENCOUNTER — ANESTHESIA (INPATIENT)
Dept: PERIOP | Facility: HOSPITAL | Age: 57
DRG: 235 | End: 2022-01-13
Payer: MEDICARE

## 2022-01-13 ENCOUNTER — APPOINTMENT (INPATIENT)
Dept: RADIOLOGY | Facility: HOSPITAL | Age: 57
DRG: 235 | End: 2022-01-13
Payer: MEDICARE

## 2022-01-13 DIAGNOSIS — I25.10 CAD IN NATIVE ARTERY: ICD-10-CM

## 2022-01-13 DIAGNOSIS — Z95.1 S/P CABG X 1: Primary | ICD-10-CM

## 2022-01-13 DIAGNOSIS — E78.2 MIXED HYPERLIPIDEMIA: ICD-10-CM

## 2022-01-13 LAB
ABO GROUP BLD: NORMAL
ANION GAP SERPL CALCULATED.3IONS-SCNC: 1 MMOL/L (ref 4–13)
BASE EXCESS BLDA CALC-SCNC: 0 MMOL/L (ref -2–3)
BASE EXCESS BLDA CALC-SCNC: 1 MMOL/L (ref -2–3)
BASE EXCESS BLDA CALC-SCNC: 3 MMOL/L (ref -2–3)
BASE EXCESS BLDA CALC-SCNC: 3 MMOL/L (ref -2–3)
BUN SERPL-MCNC: 11 MG/DL (ref 5–25)
CA-I BLD-SCNC: 0.97 MMOL/L (ref 1.12–1.32)
CA-I BLD-SCNC: 1.01 MMOL/L (ref 1.12–1.32)
CA-I BLD-SCNC: 1.03 MMOL/L (ref 1.12–1.32)
CA-I BLD-SCNC: 1.23 MMOL/L (ref 1.12–1.32)
CA-I BLD-SCNC: 1.27 MMOL/L (ref 1.12–1.32)
CA-I BLD-SCNC: 1.32 MMOL/L (ref 1.12–1.32)
CALCIUM SERPL-MCNC: 8.7 MG/DL (ref 8.3–10.1)
CHLORIDE SERPL-SCNC: 114 MMOL/L (ref 100–108)
CO2 SERPL-SCNC: 27 MMOL/L (ref 21–32)
CREAT SERPL-MCNC: 0.68 MG/DL (ref 0.6–1.3)
FIO2 GAS DIL.REBREATH: 65 L
GFR SERPL CREATININE-BSD FRML MDRD: 105 ML/MIN/1.73SQ M
GLUCOSE SERPL-MCNC: 110 MG/DL (ref 65–140)
GLUCOSE SERPL-MCNC: 121 MG/DL (ref 65–140)
GLUCOSE SERPL-MCNC: 122 MG/DL (ref 65–140)
GLUCOSE SERPL-MCNC: 123 MG/DL (ref 65–140)
GLUCOSE SERPL-MCNC: 129 MG/DL (ref 65–140)
GLUCOSE SERPL-MCNC: 133 MG/DL (ref 65–140)
GLUCOSE SERPL-MCNC: 138 MG/DL (ref 65–140)
GLUCOSE SERPL-MCNC: 145 MG/DL (ref 65–140)
GLUCOSE SERPL-MCNC: 146 MG/DL (ref 65–140)
GLUCOSE SERPL-MCNC: 148 MG/DL (ref 65–140)
GLUCOSE SERPL-MCNC: 157 MG/DL (ref 65–140)
GLUCOSE SERPL-MCNC: 160 MG/DL (ref 65–140)
HCO3 BLDA-SCNC: 25.5 MMOL/L (ref 22–28)
HCO3 BLDA-SCNC: 26.5 MMOL/L (ref 22–28)
HCO3 BLDA-SCNC: 27.4 MMOL/L (ref 22–28)
HCO3 BLDA-SCNC: 27.7 MMOL/L (ref 22–28)
HCO3 BLDA-SCNC: 28.6 MMOL/L (ref 22–28)
HCO3 BLDA-SCNC: 29.1 MMOL/L (ref 22–28)
HCT VFR BLD AUTO: 40.7 % (ref 36.5–49.3)
HCT VFR BLD AUTO: 42.8 % (ref 36.5–49.3)
HCT VFR BLD CALC: 26 % (ref 36.5–49.3)
HCT VFR BLD CALC: 27 % (ref 36.5–49.3)
HCT VFR BLD CALC: 33 % (ref 36.5–49.3)
HCT VFR BLD CALC: 35 % (ref 36.5–49.3)
HCT VFR BLD CALC: 38 % (ref 36.5–49.3)
HCT VFR BLD CALC: 41 % (ref 36.5–49.3)
HGB BLD-MCNC: 13.9 G/DL (ref 12–17)
HGB BLD-MCNC: 15 G/DL (ref 12–17)
HGB BLDA-MCNC: 11.2 G/DL (ref 12–17)
HGB BLDA-MCNC: 11.9 G/DL (ref 12–17)
HGB BLDA-MCNC: 12.9 G/DL (ref 12–17)
HGB BLDA-MCNC: 13.9 G/DL (ref 12–17)
HGB BLDA-MCNC: 8.8 G/DL (ref 12–17)
HGB BLDA-MCNC: 9.2 G/DL (ref 12–17)
KCT BLD-ACNC: 126 SEC (ref 89–137)
KCT BLD-ACNC: 126 SEC (ref 89–137)
KCT BLD-ACNC: 406 SEC (ref 89–137)
KCT BLD-ACNC: 414 SEC (ref 89–137)
KCT BLD-ACNC: 590 SEC (ref 89–137)
PCO2 BLD: 27 MMOL/L (ref 21–32)
PCO2 BLD: 28 MMOL/L (ref 21–32)
PCO2 BLD: 29 MMOL/L (ref 21–32)
PCO2 BLD: 29 MMOL/L (ref 21–32)
PCO2 BLD: 30 MMOL/L (ref 21–32)
PCO2 BLD: 31 MMOL/L (ref 21–32)
PCO2 BLD: 43.2 MM HG (ref 36–44)
PCO2 BLD: 44 MM HG (ref 36–44)
PCO2 BLD: 47 MM HG (ref 36–44)
PCO2 BLD: 50.7 MM HG (ref 36–44)
PCO2 BLD: 51.1 MM HG (ref 36–44)
PCO2 BLD: 54.2 MM HG (ref 36–44)
PH BLD: 7.34 [PH] (ref 7.35–7.45)
PH BLD: 7.37 [PH] (ref 7.35–7.45)
PH BLD: 7.39 [PH] (ref 7.35–7.45)
PH BLD: 7.39 [PH] (ref 7.35–7.45)
PLATELET # BLD AUTO: 210 THOUSANDS/UL (ref 149–390)
PMV BLD AUTO: 11.3 FL (ref 8.9–12.7)
PO2 BLD: 219 MM HG (ref 75–129)
PO2 BLD: 221 MM HG (ref 75–129)
PO2 BLD: 232 MM HG (ref 75–129)
PO2 BLD: 246 MM HG (ref 75–129)
PO2 BLD: 303 MM HG (ref 75–129)
PO2 BLD: 69 MM HG (ref 75–129)
POTASSIUM BLD-SCNC: 3.8 MMOL/L (ref 3.5–5.3)
POTASSIUM BLD-SCNC: 4.2 MMOL/L (ref 3.5–5.3)
POTASSIUM BLD-SCNC: 4.4 MMOL/L (ref 3.5–5.3)
POTASSIUM BLD-SCNC: 4.8 MMOL/L (ref 3.5–5.3)
POTASSIUM BLD-SCNC: 5.3 MMOL/L (ref 3.5–5.3)
POTASSIUM BLD-SCNC: 5.3 MMOL/L (ref 3.5–5.3)
POTASSIUM SERPL-SCNC: 3.9 MMOL/L (ref 3.5–5.3)
POTASSIUM SERPL-SCNC: 4.4 MMOL/L (ref 3.5–5.3)
POTASSIUM SERPL-SCNC: 4.5 MMOL/L (ref 3.5–5.3)
RH BLD: POSITIVE
SAO2 % BLD FROM PO2: 100 % (ref 60–85)
SAO2 % BLD FROM PO2: 92 % (ref 60–85)
SODIUM BLD-SCNC: 135 MMOL/L (ref 136–145)
SODIUM BLD-SCNC: 137 MMOL/L (ref 136–145)
SODIUM BLD-SCNC: 138 MMOL/L (ref 136–145)
SODIUM BLD-SCNC: 138 MMOL/L (ref 136–145)
SODIUM BLD-SCNC: 139 MMOL/L (ref 136–145)
SODIUM BLD-SCNC: 140 MMOL/L (ref 136–145)
SODIUM SERPL-SCNC: 142 MMOL/L (ref 136–145)
SPECIMEN SOURCE: ABNORMAL
SPECIMEN SOURCE: NORMAL
SPECIMEN SOURCE: NORMAL

## 2022-01-13 PROCEDURE — 84132 ASSAY OF SERUM POTASSIUM: CPT

## 2022-01-13 PROCEDURE — 82948 REAGENT STRIP/BLOOD GLUCOSE: CPT

## 2022-01-13 PROCEDURE — 30233N0 TRANSFUSION OF AUTOLOGOUS RED BLOOD CELLS INTO PERIPHERAL VEIN, PERCUTANEOUS APPROACH: ICD-10-PCS | Performed by: THORACIC SURGERY (CARDIOTHORACIC VASCULAR SURGERY)

## 2022-01-13 PROCEDURE — 93005 ELECTROCARDIOGRAM TRACING: CPT

## 2022-01-13 PROCEDURE — 02HV33Z INSERTION OF INFUSION DEVICE INTO SUPERIOR VENA CAVA, PERCUTANEOUS APPROACH: ICD-10-PCS | Performed by: ANESTHESIOLOGY

## 2022-01-13 PROCEDURE — 86920 COMPATIBILITY TEST SPIN: CPT

## 2022-01-13 PROCEDURE — 82330 ASSAY OF CALCIUM: CPT

## 2022-01-13 PROCEDURE — 94760 N-INVAS EAR/PLS OXIMETRY 1: CPT

## 2022-01-13 PROCEDURE — 85014 HEMATOCRIT: CPT

## 2022-01-13 PROCEDURE — 02100Z9 BYPASS CORONARY ARTERY, ONE ARTERY FROM LEFT INTERNAL MAMMARY, OPEN APPROACH: ICD-10-PCS | Performed by: THORACIC SURGERY (CARDIOTHORACIC VASCULAR SURGERY)

## 2022-01-13 PROCEDURE — 85347 COAGULATION TIME ACTIVATED: CPT

## 2022-01-13 PROCEDURE — 84132 ASSAY OF SERUM POTASSIUM: CPT | Performed by: PHYSICIAN ASSISTANT

## 2022-01-13 PROCEDURE — 94002 VENT MGMT INPAT INIT DAY: CPT

## 2022-01-13 PROCEDURE — 82947 ASSAY GLUCOSE BLOOD QUANT: CPT

## 2022-01-13 PROCEDURE — 84295 ASSAY OF SERUM SODIUM: CPT

## 2022-01-13 PROCEDURE — 85018 HEMOGLOBIN: CPT | Performed by: PHYSICIAN ASSISTANT

## 2022-01-13 PROCEDURE — 5A1221Z PERFORMANCE OF CARDIAC OUTPUT, CONTINUOUS: ICD-10-PCS | Performed by: THORACIC SURGERY (CARDIOTHORACIC VASCULAR SURGERY)

## 2022-01-13 PROCEDURE — 82803 BLOOD GASES ANY COMBINATION: CPT

## 2022-01-13 PROCEDURE — 99223 1ST HOSP IP/OBS HIGH 75: CPT | Performed by: ANESTHESIOLOGY

## 2022-01-13 PROCEDURE — 85049 AUTOMATED PLATELET COUNT: CPT | Performed by: THORACIC SURGERY (CARDIOTHORACIC VASCULAR SURGERY)

## 2022-01-13 PROCEDURE — 80048 BASIC METABOLIC PNL TOTAL CA: CPT | Performed by: PHYSICIAN ASSISTANT

## 2022-01-13 PROCEDURE — 85014 HEMATOCRIT: CPT | Performed by: PHYSICIAN ASSISTANT

## 2022-01-13 PROCEDURE — 93355 ECHO TRANSESOPHAGEAL (TEE): CPT

## 2022-01-13 PROCEDURE — 33533 CABG ARTERIAL SINGLE: CPT | Performed by: THORACIC SURGERY (CARDIOTHORACIC VASCULAR SURGERY)

## 2022-01-13 PROCEDURE — 71045 X-RAY EXAM CHEST 1 VIEW: CPT

## 2022-01-13 PROCEDURE — 5A1223Z PERFORMANCE OF CARDIAC PACING, CONTINUOUS: ICD-10-PCS | Performed by: THORACIC SURGERY (CARDIOTHORACIC VASCULAR SURGERY)

## 2022-01-13 PROCEDURE — 33533 CABG ARTERIAL SINGLE: CPT | Performed by: PHYSICIAN ASSISTANT

## 2022-01-13 PROCEDURE — 82330 ASSAY OF CALCIUM: CPT | Performed by: THORACIC SURGERY (CARDIOTHORACIC VASCULAR SURGERY)

## 2022-01-13 RX ORDER — POTASSIUM CHLORIDE 14.9 MG/ML
20 INJECTION INTRAVENOUS
Status: DISCONTINUED | OUTPATIENT
Start: 2022-01-13 | End: 2022-01-14

## 2022-01-13 RX ORDER — BISACODYL 10 MG
10 SUPPOSITORY, RECTAL RECTAL DAILY PRN
Status: DISCONTINUED | OUTPATIENT
Start: 2022-01-13 | End: 2022-01-16 | Stop reason: HOSPADM

## 2022-01-13 RX ORDER — CEFAZOLIN SODIUM 2 G/50ML
2000 SOLUTION INTRAVENOUS EVERY 8 HOURS
Status: COMPLETED | OUTPATIENT
Start: 2022-01-13 | End: 2022-01-14

## 2022-01-13 RX ORDER — GLYCOPYRROLATE 0.2 MG/ML
INJECTION INTRAMUSCULAR; INTRAVENOUS AS NEEDED
Status: DISCONTINUED | OUTPATIENT
Start: 2022-01-13 | End: 2022-01-13

## 2022-01-13 RX ORDER — POTASSIUM CHLORIDE 14.9 MG/ML
20 INJECTION INTRAVENOUS ONCE AS NEEDED
Status: DISCONTINUED | OUTPATIENT
Start: 2022-01-13 | End: 2022-01-14

## 2022-01-13 RX ORDER — MAGNESIUM SULFATE HEPTAHYDRATE 40 MG/ML
2 INJECTION, SOLUTION INTRAVENOUS ONCE
Status: COMPLETED | OUTPATIENT
Start: 2022-01-13 | End: 2022-01-13

## 2022-01-13 RX ORDER — CHLORHEXIDINE GLUCONATE 0.12 MG/ML
15 RINSE ORAL 2 TIMES DAILY
Status: DISCONTINUED | OUTPATIENT
Start: 2022-01-13 | End: 2022-01-16 | Stop reason: HOSPADM

## 2022-01-13 RX ORDER — ONDANSETRON 2 MG/ML
4 INJECTION INTRAMUSCULAR; INTRAVENOUS EVERY 6 HOURS PRN
Status: DISCONTINUED | OUTPATIENT
Start: 2022-01-13 | End: 2022-01-14

## 2022-01-13 RX ORDER — SODIUM CHLORIDE 450 MG/100ML
20 INJECTION, SOLUTION INTRAVENOUS CONTINUOUS
Status: DISCONTINUED | OUTPATIENT
Start: 2022-01-13 | End: 2022-01-14

## 2022-01-13 RX ORDER — VANCOMYCIN HYDROCHLORIDE 1 G/20ML
INJECTION, POWDER, LYOPHILIZED, FOR SOLUTION INTRAVENOUS AS NEEDED
Status: DISCONTINUED | OUTPATIENT
Start: 2022-01-13 | End: 2022-01-13 | Stop reason: HOSPADM

## 2022-01-13 RX ORDER — PANTOPRAZOLE SODIUM 40 MG/1
40 TABLET, DELAYED RELEASE ORAL
Status: DISCONTINUED | OUTPATIENT
Start: 2022-01-13 | End: 2022-01-16 | Stop reason: HOSPADM

## 2022-01-13 RX ORDER — VILAZODONE HYDROCHLORIDE 40 MG/1
40 TABLET ORAL
Status: DISCONTINUED | OUTPATIENT
Start: 2022-01-14 | End: 2022-01-16 | Stop reason: HOSPADM

## 2022-01-13 RX ORDER — AMIODARONE HYDROCHLORIDE 200 MG/1
200 TABLET ORAL EVERY 8 HOURS SCHEDULED
Status: DISCONTINUED | OUTPATIENT
Start: 2022-01-13 | End: 2022-01-16 | Stop reason: HOSPADM

## 2022-01-13 RX ORDER — ACETAMINOPHEN 325 MG/1
975 TABLET ORAL EVERY 8 HOURS
Status: DISCONTINUED | OUTPATIENT
Start: 2022-01-13 | End: 2022-01-13

## 2022-01-13 RX ORDER — SODIUM CHLORIDE 9 MG/ML
INJECTION, SOLUTION INTRAVENOUS CONTINUOUS PRN
Status: DISCONTINUED | OUTPATIENT
Start: 2022-01-13 | End: 2022-01-13

## 2022-01-13 RX ORDER — HYDROMORPHONE HCL/PF 1 MG/ML
0.5 SYRINGE (ML) INJECTION EVERY 2 HOUR PRN
Status: DISCONTINUED | OUTPATIENT
Start: 2022-01-13 | End: 2022-01-14

## 2022-01-13 RX ORDER — SODIUM CHLORIDE, SODIUM LACTATE, POTASSIUM CHLORIDE, CALCIUM CHLORIDE 600; 310; 30; 20 MG/100ML; MG/100ML; MG/100ML; MG/100ML
INJECTION, SOLUTION INTRAVENOUS CONTINUOUS PRN
Status: DISCONTINUED | OUTPATIENT
Start: 2022-01-13 | End: 2022-01-13

## 2022-01-13 RX ORDER — HEPARIN SODIUM 1000 [USP'U]/ML
INJECTION, SOLUTION INTRAVENOUS; SUBCUTANEOUS AS NEEDED
Status: DISCONTINUED | OUTPATIENT
Start: 2022-01-13 | End: 2022-01-13

## 2022-01-13 RX ORDER — FUROSEMIDE 10 MG/ML
40 INJECTION INTRAMUSCULAR; INTRAVENOUS EVERY 6 HOURS PRN
Status: DISCONTINUED | OUTPATIENT
Start: 2022-01-13 | End: 2022-01-14

## 2022-01-13 RX ORDER — ASPIRIN 325 MG
325 TABLET ORAL DAILY
Status: DISCONTINUED | OUTPATIENT
Start: 2022-01-13 | End: 2022-01-14

## 2022-01-13 RX ORDER — QUETIAPINE FUMARATE 100 MG/1
100 TABLET, FILM COATED ORAL
Status: DISCONTINUED | OUTPATIENT
Start: 2022-01-13 | End: 2022-01-16 | Stop reason: HOSPADM

## 2022-01-13 RX ORDER — CALCIUM CHLORIDE 100 MG/ML
1 INJECTION INTRAVENOUS; INTRAVENTRICULAR ONCE
Status: DISCONTINUED | OUTPATIENT
Start: 2022-01-13 | End: 2022-01-14

## 2022-01-13 RX ORDER — ACETAMINOPHEN 325 MG/1
975 TABLET ORAL EVERY 8 HOURS
Status: DISCONTINUED | OUTPATIENT
Start: 2022-01-13 | End: 2022-01-16 | Stop reason: HOSPADM

## 2022-01-13 RX ORDER — NEOSTIGMINE METHYLSULFATE 1 MG/ML
INJECTION INTRAVENOUS AS NEEDED
Status: DISCONTINUED | OUTPATIENT
Start: 2022-01-13 | End: 2022-01-13

## 2022-01-13 RX ORDER — OXYCODONE HYDROCHLORIDE 5 MG/1
5 TABLET ORAL EVERY 4 HOURS PRN
Status: DISCONTINUED | OUTPATIENT
Start: 2022-01-13 | End: 2022-01-16 | Stop reason: HOSPADM

## 2022-01-13 RX ORDER — HYDRALAZINE HYDROCHLORIDE 20 MG/ML
5 INJECTION INTRAMUSCULAR; INTRAVENOUS EVERY 6 HOURS PRN
Status: DISCONTINUED | OUTPATIENT
Start: 2022-01-13 | End: 2022-01-14

## 2022-01-13 RX ORDER — OXYCODONE HYDROCHLORIDE 5 MG/1
5 TABLET ORAL EVERY 4 HOURS PRN
Status: DISCONTINUED | OUTPATIENT
Start: 2022-01-13 | End: 2022-01-13

## 2022-01-13 RX ORDER — CLONAZEPAM 0.5 MG/1
0.5 TABLET ORAL 2 TIMES DAILY PRN
Status: DISCONTINUED | OUTPATIENT
Start: 2022-01-13 | End: 2022-01-16 | Stop reason: HOSPADM

## 2022-01-13 RX ORDER — OXYCODONE HYDROCHLORIDE 10 MG/1
10 TABLET ORAL EVERY 4 HOURS PRN
Status: DISCONTINUED | OUTPATIENT
Start: 2022-01-13 | End: 2022-01-14

## 2022-01-13 RX ORDER — FONDAPARINUX SODIUM 2.5 MG/.5ML
2.5 INJECTION SUBCUTANEOUS DAILY
Status: DISCONTINUED | OUTPATIENT
Start: 2022-01-14 | End: 2022-01-16 | Stop reason: HOSPADM

## 2022-01-13 RX ORDER — EZETIMIBE 10 MG/1
10 TABLET ORAL DAILY
Status: DISCONTINUED | OUTPATIENT
Start: 2022-01-13 | End: 2022-01-16 | Stop reason: HOSPADM

## 2022-01-13 RX ORDER — LABETALOL 20 MG/4 ML (5 MG/ML) INTRAVENOUS SYRINGE
10 EVERY 6 HOURS PRN
Status: DISCONTINUED | OUTPATIENT
Start: 2022-01-13 | End: 2022-01-14

## 2022-01-13 RX ORDER — MAGNESIUM HYDROXIDE 1200 MG/15ML
LIQUID ORAL AS NEEDED
Status: DISCONTINUED | OUTPATIENT
Start: 2022-01-13 | End: 2022-01-13 | Stop reason: HOSPADM

## 2022-01-13 RX ORDER — ACETAMINOPHEN 650 MG/1
650 SUPPOSITORY RECTAL EVERY 4 HOURS PRN
Status: DISCONTINUED | OUTPATIENT
Start: 2022-01-13 | End: 2022-01-16 | Stop reason: HOSPADM

## 2022-01-13 RX ORDER — ATORVASTATIN CALCIUM 80 MG/1
80 TABLET, FILM COATED ORAL
Status: DISCONTINUED | OUTPATIENT
Start: 2022-01-13 | End: 2022-01-16 | Stop reason: HOSPADM

## 2022-01-13 RX ORDER — PROTAMINE SULFATE 10 MG/ML
INJECTION, SOLUTION INTRAVENOUS AS NEEDED
Status: DISCONTINUED | OUTPATIENT
Start: 2022-01-13 | End: 2022-01-13

## 2022-01-13 RX ORDER — CHLORHEXIDINE GLUCONATE 0.12 MG/ML
15 RINSE ORAL ONCE
Status: COMPLETED | OUTPATIENT
Start: 2022-01-13 | End: 2022-01-13

## 2022-01-13 RX ORDER — CEFAZOLIN SODIUM 2 G/50ML
2000 SOLUTION INTRAVENOUS ONCE
Status: COMPLETED | OUTPATIENT
Start: 2022-01-13 | End: 2022-01-13

## 2022-01-13 RX ORDER — OXYCODONE HYDROCHLORIDE 5 MG/1
2.5 TABLET ORAL EVERY 4 HOURS PRN
Status: DISCONTINUED | OUTPATIENT
Start: 2022-01-13 | End: 2022-01-13

## 2022-01-13 RX ORDER — FENTANYL CITRATE 50 UG/ML
50 INJECTION, SOLUTION INTRAMUSCULAR; INTRAVENOUS
Status: DISCONTINUED | OUTPATIENT
Start: 2022-01-13 | End: 2022-01-14

## 2022-01-13 RX ORDER — POLYETHYLENE GLYCOL 3350 17 G/17G
17 POWDER, FOR SOLUTION ORAL DAILY
Status: DISCONTINUED | OUTPATIENT
Start: 2022-01-13 | End: 2022-01-16 | Stop reason: HOSPADM

## 2022-01-13 RX ORDER — FENTANYL CITRATE 50 UG/ML
50 INJECTION, SOLUTION INTRAMUSCULAR; INTRAVENOUS ONCE
Status: COMPLETED | OUTPATIENT
Start: 2022-01-13 | End: 2022-01-13

## 2022-01-13 RX ADMIN — HYDROMORPHONE HYDROCHLORIDE 0.5 MG: 1 INJECTION, SOLUTION INTRAMUSCULAR; INTRAVENOUS; SUBCUTANEOUS at 17:58

## 2022-01-13 RX ADMIN — OXYCODONE HYDROCHLORIDE 10 MG: 10 TABLET ORAL at 22:50

## 2022-01-13 RX ADMIN — SODIUM CHLORIDE, SODIUM LACTATE, POTASSIUM CHLORIDE, AND CALCIUM CHLORIDE: .6; .31; .03; .02 INJECTION, SOLUTION INTRAVENOUS at 07:28

## 2022-01-13 RX ADMIN — NICARDIPINE HYDROCHLORIDE 14 MG/HR: 25 INJECTION, SOLUTION INTRAVENOUS at 21:13

## 2022-01-13 RX ADMIN — HEPARIN SODIUM 28000 UNITS: 1000 INJECTION INTRAVENOUS; SUBCUTANEOUS at 09:29

## 2022-01-13 RX ADMIN — SODIUM CHLORIDE, SODIUM LACTATE, POTASSIUM CHLORIDE, AND CALCIUM CHLORIDE 500 ML: .6; .31; .03; .02 INJECTION, SOLUTION INTRAVENOUS at 11:45

## 2022-01-13 RX ADMIN — Medication 12.5 MG: at 06:11

## 2022-01-13 RX ADMIN — MUPIROCIN 1 APPLICATION: 20 OINTMENT TOPICAL at 06:10

## 2022-01-13 RX ADMIN — FENTANYL CITRATE 50 MCG: 50 INJECTION INTRAMUSCULAR; INTRAVENOUS at 12:15

## 2022-01-13 RX ADMIN — SODIUM CHLORIDE 20 ML/HR: 0.45 INJECTION, SOLUTION INTRAVENOUS at 12:00

## 2022-01-13 RX ADMIN — PHENYLEPHRINE HYDROCHLORIDE 20 MCG/MIN: 10 INJECTION INTRAVENOUS at 11:12

## 2022-01-13 RX ADMIN — CHLORHEXIDINE GLUCONATE 15 ML: 1.2 SOLUTION ORAL at 17:13

## 2022-01-13 RX ADMIN — MIRTAZAPINE 45 MG: 30 TABLET, FILM COATED ORAL at 21:08

## 2022-01-13 RX ADMIN — SODIUM CHLORIDE, SODIUM LACTATE, POTASSIUM CHLORIDE, AND CALCIUM CHLORIDE 500 ML: .6; .31; .03; .02 INJECTION, SOLUTION INTRAVENOUS at 13:55

## 2022-01-13 RX ADMIN — GLYCOPYRROLATE 0.8 MG: 0.2 INJECTION, SOLUTION INTRAMUSCULAR; INTRAVENOUS at 11:02

## 2022-01-13 RX ADMIN — POTASSIUM CHLORIDE 20 MEQ: 14.9 INJECTION, SOLUTION INTRAVENOUS at 17:13

## 2022-01-13 RX ADMIN — AMIODARONE HYDROCHLORIDE 200 MG: 200 TABLET ORAL at 14:59

## 2022-01-13 RX ADMIN — OXYCODONE HYDROCHLORIDE 10 MG: 10 TABLET ORAL at 19:04

## 2022-01-13 RX ADMIN — CEFAZOLIN SODIUM 2000 MG: 2 SOLUTION INTRAVENOUS at 10:35

## 2022-01-13 RX ADMIN — CLONAZEPAM 0.5 MG: 0.5 TABLET ORAL at 22:24

## 2022-01-13 RX ADMIN — NICARDIPINE HYDROCHLORIDE 5 MG/HR: 25 INJECTION, SOLUTION INTRAVENOUS at 11:45

## 2022-01-13 RX ADMIN — PROTAMINE SULFATE 240 MG: 10 INJECTION, SOLUTION INTRAVENOUS at 10:26

## 2022-01-13 RX ADMIN — HYDRALAZINE HYDROCHLORIDE 5 MG: 20 INJECTION, SOLUTION INTRAMUSCULAR; INTRAVENOUS at 22:19

## 2022-01-13 RX ADMIN — MAGNESIUM SULFATE HEPTAHYDRATE 2 G: 40 INJECTION, SOLUTION INTRAVENOUS at 12:00

## 2022-01-13 RX ADMIN — NICARDIPINE HYDROCHLORIDE 15 MG/HR: 25 INJECTION, SOLUTION INTRAVENOUS at 22:55

## 2022-01-13 RX ADMIN — CEFAZOLIN SODIUM 2000 MG: 2 SOLUTION INTRAVENOUS at 17:13

## 2022-01-13 RX ADMIN — AMIODARONE HYDROCHLORIDE 200 MG: 200 TABLET ORAL at 21:07

## 2022-01-13 RX ADMIN — PROTAMINE SULFATE 10 MG: 10 INJECTION, SOLUTION INTRAVENOUS at 10:25

## 2022-01-13 RX ADMIN — MUPIROCIN 1 APPLICATION: 20 OINTMENT TOPICAL at 21:08

## 2022-01-13 RX ADMIN — CHLORHEXIDINE GLUCONATE 15 ML: 1.2 SOLUTION ORAL at 06:09

## 2022-01-13 RX ADMIN — HYDROMORPHONE HYDROCHLORIDE 0.5 MG: 1 INJECTION, SOLUTION INTRAMUSCULAR; INTRAVENOUS; SUBCUTANEOUS at 19:51

## 2022-01-13 RX ADMIN — CEFAZOLIN SODIUM 2000 MG: 2 SOLUTION INTRAVENOUS at 08:05

## 2022-01-13 RX ADMIN — SODIUM CHLORIDE: 0.9 INJECTION, SOLUTION INTRAVENOUS at 07:28

## 2022-01-13 RX ADMIN — HYDROMORPHONE HYDROCHLORIDE 0.5 MG: 1 INJECTION, SOLUTION INTRAMUSCULAR; INTRAVENOUS; SUBCUTANEOUS at 21:42

## 2022-01-13 RX ADMIN — FENTANYL CITRATE 50 MCG: 50 INJECTION INTRAMUSCULAR; INTRAVENOUS at 11:45

## 2022-01-13 RX ADMIN — LABETALOL HYDROCHLORIDE 10 MG: 5 INJECTION, SOLUTION INTRAVENOUS at 23:28

## 2022-01-13 RX ADMIN — ACETAMINOPHEN 975 MG: 325 TABLET, FILM COATED ORAL at 19:04

## 2022-01-13 RX ADMIN — SODIUM CHLORIDE 3 UNITS/HR: 9 INJECTION, SOLUTION INTRAVENOUS at 11:45

## 2022-01-13 RX ADMIN — HYDROMORPHONE HYDROCHLORIDE 0.5 MG: 1 INJECTION, SOLUTION INTRAMUSCULAR; INTRAVENOUS; SUBCUTANEOUS at 16:01

## 2022-01-13 RX ADMIN — OXYCODONE HYDROCHLORIDE 5 MG: 5 TABLET ORAL at 15:01

## 2022-01-13 RX ADMIN — NICARDIPINE HYDROCHLORIDE 7.5 MG/HR: 25 INJECTION, SOLUTION INTRAVENOUS at 18:52

## 2022-01-13 RX ADMIN — SODIUM CHLORIDE 2 UNITS/HR: 9 INJECTION, SOLUTION INTRAVENOUS at 11:18

## 2022-01-13 RX ADMIN — SODIUM CHLORIDE 0.3 MCG/KG/HR: 900 INJECTION INTRAVENOUS at 08:23

## 2022-01-13 RX ADMIN — HYDROMORPHONE HYDROCHLORIDE 0.5 MG: 1 INJECTION, SOLUTION INTRAMUSCULAR; INTRAVENOUS; SUBCUTANEOUS at 12:29

## 2022-01-13 RX ADMIN — NEOSTIGMINE METHYLSULFATE 5 MG: 1 INJECTION INTRAVENOUS at 11:02

## 2022-01-13 NOTE — ANESTHESIA PROCEDURE NOTES
Procedure Performed: ELADIA Anesthesia  Start Time:  1/13/2022 8:02 AM        Preanesthesia Checklist    Patient identified, IV assessed, risks and benefits discussed, monitors and equipment assessed, procedure being performed at surgeon's request and anesthesia consent obtained  Procedure    Diagnostic Indications for ELADIA:  assessment of surgical repair and hemodynamic monitoring  Type of ELADIA: complete ELADIA with interpretation  Images Saved: ultrasound permanent image saved  Physician Requesting Echo: Luis Enrique Pearson MD  Location performed: OR  Intubated  Bite block not placed  Heart visualized  Insertion of ELADIA Probe:  Atraumatic  Probe Type:  Multiplane  Modalities:  2D only, color flow mapping, continuous wave Doppler and pulse wave Doppler  Echocardiographic and Doppler Measurements    PREPROCEDURE    LEFT VENTRICLE:  Systolic Function: normal  Ejection Fraction: 50%  Cavity size: normal      RIGHT VENTRICLE:  Systolic Function: normal   Cavity size normal  No hypertrophy              AORTIC VALVE:  Leaflets: normal and trileaflet  Leaflet motions normal and normal  Stenosis: none  Regurgitation: none  MITRAL VALVE:  Leaflets: normal  Leaflet Motions: normal  Regurgitation: mild  Stenosis: none  TRICUSPID VALVE:  Leaflets: normal  Leaflet Motions: normal  Stenosis: none  Regurgitation: mild  PULMONIC VALVE:  Leaflets: normal  Regurgitation: none  Stenosis: none  ASCENDING AORTA:  Size:  normal  Dissection not present  AORTIC ARCH:  Size:  normal  dissection not present  Grade 3: atheroma protruding < 0 5 cm into lumen  DESCENDING AORTA:  Size: normal  Dissection not present  Grade 3: atheroma protruding < 0 5 cm into lumen          RIGHT ATRIUM:  Size:  normal      LEFT ATRIUM:  Size: normal      LEFT ATRIAL APPENDAGE:  Size: normal          ATRIAL SEPTUM:  Intra-atrial septal morphology: normal          VENTRICULAR SEPTUM:  Intra-ventricular septum morphology: normal  OTHER FINDINGS:  Pericardium:  normal  Pleural Effusion:  none  POSTPROCEDURE    LEFT VENTRICLE: Unchanged   RIGHT VENTRICLE: Unchanged   AORTIC VALVE: Unchanged   MITRAL VALVE: Unchanged   TRICUSPID VALVE: Unchanged   PULMONIC VALVE: Unchanged           ATRIA: Unchanged   AORTA: Unchanged   REMOVAL:  Probe Removal: atraumatic

## 2022-01-13 NOTE — ANESTHESIA PREPROCEDURE EVALUATION
Procedure:  CORONARY ARTERY BYPASS GRAFT (CABG) x1- LIMA (N/A Chest)  TRANSESOPHAGEAL ECHOCARDIOGRAM (ELADIA) (N/A Esophagus)    Relevant Problems   CARDIO   (+) Chest pain with high risk for cardiac etiology   (+) Coronary artery disease involving native coronary artery of native heart with angina pectoris (HCC)   (+) Mixed hyperlipidemia   (+) NSTEMI (non-ST elevated myocardial infarction) ([facility] [address] )   (+) Old myocardial infarct   (+) Primary hypertension   (+) S/P CABG x 1   (+) Ventricular tachycardia (HCC)      NEURO/PSYCH   (+) Anxiety   (+) Anxiety with depression   (+) Chronic pain syndrome   (+) Episodic cluster headache, not intractable        Physical Exam    Airway    Mallampati score: II         Dental   No notable dental hx     Cardiovascular      Pulmonary      Other Findings        Anesthesia Plan  ASA Score- 4     Anesthesia Type- general with ASA Monitors  Additional Monitors: arterial line, central venous line and pulmonary artery catheter  Airway Plan: ETT  Comment: I, Dr Panda Cardenas, the attending physician, have personally seen and evaluated the patient prior to anesthetic care  I have reviewed the pre-anesthetic record, and other medical records if appropriate to the anesthetic care  If a CRNA is involved in the case, I have reviewed the CRNA assessment, if present, and agree  The patient is in a suitable condition to proceed with my formulated anesthetic plan          Plan Factors-    Chart reviewed  Induction- intravenous  Postoperative Plan-     Informed Consent- Anesthetic plan and risks discussed with patient  I personally reviewed this patient with the CRNA  Discussed and agreed on the Anesthesia Plan with the CRNA  Alan Briscoe

## 2022-01-13 NOTE — ANESTHESIA POSTPROCEDURE EVALUATION
Post-Op Assessment Note    CV Status:  Stable    Pain management: adequate     Mental Status:  Unresponsive   Hydration Status:  Stable   PONV Controlled:  None   Airway Patency:  Patent  Airway: intubated      Post Op Vitals Reviewed: Yes      Staff: Anesthesiologist         No complications documented      BP      Temp     Pulse     Resp      SpO2

## 2022-01-13 NOTE — ANESTHESIA PROCEDURE NOTES
Introducer/[place]-Valerio  Performed by: Leyla Dorantes MD  Authorized by: Leyla Dorantes MD     Date/Time: 1/13/2022 8:00 AM  Consent: Written consent obtained  Risks and benefits: risks, benefits and alternatives were discussed  Consent given by: patient  Patient understanding: patient states understanding of the procedure being performed  Patient consent: the patient's understanding of the procedure matches consent given  Procedure consent: procedure consent matches procedure scheduled  Required items: required blood products, implants, devices, and special equipment available  Patient identity confirmed: arm band  Time out: Immediately prior to procedure a "time out" was called to verify the correct patient, procedure, equipment, support staff and site/side marked as required    Indications: vascular access and central pressure monitoring  Location details: right internal jugular  Patient position: Trendelenburg  Assessment: blood return through all ports and free fluid flow  Preparation: skin prepped with ChloraPrep  Skin prep agent dried: skin prep agent completely dried prior to procedure  Sterile barriers: all five maximum sterile barriers used - cap, mask, sterile gown, sterile gloves, and large sterile sheet  Hand hygiene: hand hygiene performed prior to central venous catheter insertion  Ultrasound guidance: yes  ultrasound permanent image saved  Pre-procedure: landmarks identified  Number of attempts: 1  Successful placement: yes  Post-procedure: line sutured and dressing applied  Patient tolerance: patient tolerated the procedure well with no immediate complications  Comments: Procedure start 0748  Procedure end 0800

## 2022-01-13 NOTE — ANESTHESIA PROCEDURE NOTES
Central Line Insertion  Performed by: Donny Ozuna MD  Authorized by: Donny Ozuna MD     Date/Time: 1/13/2022 8:00 AM  Catheter Type:  triple lumen  Consent: Written consent obtained  Risks and benefits: risks, benefits and alternatives were discussed  Consent given by: patient  Patient understanding: patient states understanding of the procedure being performed  Patient consent: the patient's understanding of the procedure matches consent given  Procedure consent: procedure consent matches procedure scheduled  Required items: required blood products, implants, devices, and special equipment available  Patient identity confirmed: arm band  Time out: Immediately prior to procedure a "time out" was called to verify the correct patient, procedure, equipment, support staff and site/side marked as required    Indications: vascular access and central pressure monitoring  Location details: right internal jugular  Patient position: Trendelenburg    Sedation:  Patient sedated: yes    Assessment: blood return through all ports and free fluid flow  Preparation: skin prepped with ChloraPrep  Skin prep agent dried: skin prep agent completely dried prior to procedure  Sterile barriers: all five maximum sterile barriers used - cap, mask, sterile gown, sterile gloves, and large sterile sheet  Hand hygiene: hand hygiene performed prior to central venous catheter insertion  Ultrasound guidance: yes  sterile gel and probe cover used in ultrasound-guided central venous catheter insertionultrasound permanent image saved  Pre-procedure: landmarks identified  Number of attempts: 1  Successful placement: yes  Post-procedure: chlorhexidine patch applied,  dressing applied and line sutured  Patient tolerance: patient tolerated the procedure well with no immediate complications  Comments: Procedure start 0748  Procedure end 0800

## 2022-01-13 NOTE — INTERVAL H&P NOTE
Vitals:    01/13/22 0547   BP: 148/80   Pulse: 69   Resp: 16   Temp: (!) 97 4 °F (36 3 °C)   SpO2: 98%         H&P reviewed  After examining the patient I find no changes in the patients condition since the H&P was completed  Plan for CABG  Preoperative Beta Blocker: indicated  Anticipated Length of Stay: Patient will be admitted on an inpatient basis with an anticipated length of stay of grater than 2 midnights  Justification for Hospital StaY: Post surgical recovery following open heart surgery        Jonathan Sharma MD  01/13/22  7:25 AM

## 2022-01-13 NOTE — RESPIRATORY THERAPY NOTE
RT Protocol Note  Rachel Chinchilla 60 y o  male MRN: [id number]  Unit/Bed#: [facility] 414-01 Encounter: [id number]    Assessment    Principal Problem:    Coronary artery disease involving native coronary artery of native heart with angina pectoris (HCC)  Active Problems:    Primary hypertension    Old myocardial infarct    Anxiety with depression    Chest pain with high risk for cardiac etiology    Mixed hyperlipidemia    Episodic cluster headache, not intractable    Chronic pain syndrome    NSTEMI (non-ST elevated myocardial infarction) ([facility])    S/P CABG x 1      Home Pulmonary Medications:  none       Past Medical History:   Diagnosis Date    Angina pectoris (HCC)     Anxiety     Arthritis     Back pain     CAD (coronary artery disease)     DDD (degenerative disc disease), lumbosacral     Depressed     Full dentures     GERD (gastroesophageal reflux disease)     Hyperlipidemia     Hypertension     MI (myocardial infarction) ([facility] [address] )     2009    Wears glasses      Social History     Socioeconomic History    Marital status: /Civil Union     Spouse name: None    Number of children: None    Years of education: None    Highest education level: None   Occupational History    None   Tobacco Use    Smoking status: Former Smoker     Quit date: 2008     Years since quittin 1    Smokeless tobacco: Never Used   Vaping Use    Vaping Use: Never used   Substance and Sexual Activity    Alcohol use: Never     Alcohol/week: 0 0 standard drinks    Drug use: No    Sexual activity: None   Other Topics Concern    None   Social History Narrative    None     Social Determinants of Health     Financial Resource Strain: Not on file   Food Insecurity: No Food Insecurity    Worried About Running Out of Food in the Last Year: Never true    Vinnie of Food in the Last Year: Never true   Transportation Needs: No Transportation Needs    Lack of Transportation (Medical):  No    Lack of Transportation (Non-Medical): No   Physical Activity: Not on file   Stress: Not on file   Social Connections: Not on file   Intimate Partner Violence: Not on file   Housing Stability: Low Risk     Unable to Pay for Housing in the Last Year: No    Number of Places Lived in the Last Year: 1    Unstable Housing in the Last Year: No       Subjective         Objective    Physical Exam:   Assessment Type: (P) Assess only  General Appearance: (P) Sedated  Respiratory Pattern: (P) Assisted  Chest Assessment: (P) Chest expansion symmetrical  Bilateral Breath Sounds: (P) Clear    Vitals:  Blood pressure 148/80, pulse (P) 65, temperature (!) 97 4 °F (36 3 °C), temperature source Temporal, resp  rate 16, height 5' 10" (1 778 m), weight 70 8 kg (156 lb), SpO2 100 %  Imaging and other studies: I have personally reviewed pertinent reports  Plan    Respiratory Plan: (P) Vent/NIV/HFNC  Airway Clearance Plan: (P) Incentive Spirometer     Resp Comments: (P) Pt received at this time from the OR S/P CABG and placed on the vent with AC/VC settings  ETT found at the 23 from the lip, bilateral BS heard  Pt tolerating vent settings well  Chart reviewed per resp/ acp protocol  Pt has no pulmonary HX and takes no resp medications at home, will monitor while on vent per resp protocol  Per ACP pt will be instructed on IS post extubation  Will continue to monitor

## 2022-01-13 NOTE — ANESTHESIA PROCEDURE NOTES
Arterial Line Insertion  Performed by: Jesenia Sinclair MD  Authorized by: Jesenia Sinclair MD   Consent: Written consent obtained  Risks and benefits: risks, benefits and alternatives were discussed  Consent given by: patient  Patient understanding: patient states understanding of the procedure being performed  Patient consent: the patient's understanding of the procedure matches consent given  Procedure consent: procedure consent matches procedure scheduled  Required items: required blood products, implants, devices, and special equipment available  Patient identity confirmed: arm band  Time out: Immediately prior to procedure a "time out" was called to verify the correct patient, procedure, equipment, support staff and site/side marked as required  Preparation: Patient was prepped and draped in the usual sterile fashion    Indications: multiple ABGs and hemodynamic monitoring  Orientation:  Left  Location: radial artery  Sedation:  Patient sedated: yes    Procedure Details:  Needle gauge: 20  Number of attempts: 1    Post-procedure:  Post-procedure: dressing applied  Waveform: good waveform and waveform confirmed  Post-procedure CNS: normal  Patient tolerance: patient tolerated the procedure well with no immediate complications  Comments: Procedure start 1245  Procedure end 5960

## 2022-01-13 NOTE — RESPIRATORY THERAPY NOTE
RT Ventilator Management Note  Marilu Kang 60 y o  male MRN: [id number]  Unit/Bed#: [facility] 414-01 Encounter: [id number]      Daily Screen       1/13/2022  1322 1/13/2022  1520          Patient safety screen outcome[de-identified] Passed --      Spont breathing trial % for 30 min: -- --      Spont breathing trial outcome[de-identified] -- Passed      Name of Medical Team Notified[de-identified] -- MD      Preparing to extubate/ Notify Nurse: -- Yes      Extubation order obtained: -- Yes      Consider Cuff Test: -- Yes      Patient extubated: -- Yes      RSBI: -- 58              Physical Exam:   Assessment Type: Assess only  General Appearance: Alert,Awake  Respiratory Pattern: Normal  Chest Assessment: Chest expansion symmetrical  Bilateral Breath Sounds: Diminished      Resp Comments: Pt extubated at this time with no complications and placed on a 4l NC  SpO2 WNL  Pt able tp use voice and no stridr heard  Will continue to monitor per  ACP

## 2022-01-13 NOTE — OP NOTE
OPERATIVE REPORT  PATIENT NAME: Rafael Ness    :  1965  MRN: [id number]  Pt Location: BE OR ROOM 16    SURGERY DATE: 2022    SURGEON: Zulema Ramsey MD    ASSISTANT: Danny Angelo PA-C    ADDITIONAL ASSISTANT: None    PREOPERATIVE DIAGNOSIS:  Isolated ostial LAD coronary artery disease as a complication of PCI    POSTOPERATIVE DIAGNOSIS:   Isolated ostial LAD coronary artery disease as a complication of PCI    NYHA Class: 4    CCS Class: 4    PROCEDURE: Coronary artery bypass grafting x 1 with left internal mammary artery to left anterior descending  ANESTHESIA: General endotracheal anesthesia with transesophageal echocardiogram guidance, Dr Desir Colder: 21 minutes  CROSSCLAMP TIME: 13 minutes  PACKS/TUBES/DRAINS: Chest tubes x 3  MATERIALS: Pacing wires: A x1  V x1  TRANSFUSION: None  SPECIMENS: None  ESTIMATED BLOOD LOSS: 200 mL    OPERATIVE TECHNIQUE:    The patient was taken to the operating room and placed supine on the operating table  Following the satisfactory induction of general anesthesia and placement of monitoring lines, the patient was prepped and draped in the usual sterile fashion  A time-out procedure was performed  The patient underwent median sternotomy, LIMA harvest, systemic heparinization and conduit preparation  The patient underwent pericardiotomy and epiaortic ultrasound was used to evaluate the ascending aorta, which was found to be free of significant atheromatous disease  The patient underwent aortic and right atrial cannulation and was initiated on bypass  The ascending aorta was crossclamped  Ante[place] cardioplegia was delivered with an excellent arrest     The left internal mammary artery was anastomosed to the left anterior descending in end-to-side fashion using running 7-0 Prolene suture  The quality of the graft was excellent  The heart was de-aired and the crossclamp was removed   Atrial and ventricular pacing wires were placed  Following a period of reperfusion, the patient was weaned from cardiopulmonary bypass and decannulated  Protamine was administered with normalization of the ACT  Hemostasis was confirmed in all fields  Thoracostomy tubes were placed  The sternum was closed with stainless steel wires  The fascia, subdermis and skin were closed with multiple layers of running absorbable suture  As the attending surgeonBLANCHE was present and scrubbed for all critical portions of this procedure  Sponge, needle and instrument counts were reported as correct by the nursing staff  There is no cardiac residency program at this facility and for complex procedures such as this, it is vital to have a physician assistant experienced in cardiac surgery  The assistance of Sol Batres was necessary for retraction of the heart and coronary conduit with proper tissue handling techniques, and following of the suture with correct tension during construction of the distal coronary anastomosis  Final transesophageal echocardiogram demonstrated normal biventricular function          Nola Cuba MD  DATE: January 13, 2022  TIME: 11:11 AM

## 2022-01-13 NOTE — CONSULTS
Edilberto Calix 60 y o  male MRN: [id number]  Unit/Bed#: [facility] 414-01 Encounter: [id number]    Inpatient consult to Oresteskaroline Stacy Lopez performed by: Brenda Boggs PA-C  Consult ordered by: Emmett Mcneal PA-C          Physician Requesting Consult: Lina Aguilar MD    Reason for Consult / Principal Problem: Coronary artery disease involving native coronary artery of native heart with angina pectoris [facility])    HPI: Justin Reyna is a 60 y o  male with PMH significant for CAD (anterior MI 5/2009 with PCI to LAD), NSTEMI (12/21 with unsuccessful PCI to LAD), HTN, HLD, VT, GERD, chronic pain syndrome, DDD, thoracic spondylosis with spinal nerve blocks, cluster HA, anxiety/depression who initially presented to [facility] on 12/23 with complaints of CP  Found to have NSTEMI at that time and had unsuccessful PCI to LAD which resulted in plaque shift leaving 95% ostial LAD stenosis  Patient opted to follow-up outpatient rather than transfer to [facility] and [facility] at the time  Followed up with [facility] and was recommended for surgical revascularization  Now presents to ICU s/p CABG x1 (LIMA--> LAD)  Post-procedure ELADIA revealed LVEF 50%, normal biventricular function, no significant valvular abnormalities  Received no blood products intra-operatively  Arrives on no vasoactive gtts, only low dose precedex for sedation  CARDIOPULMONARY BYPASS TIME: 21 minutes  CROSSCLAMP TIME: 13 minutes      PMH: CAD (anterior MI 5/2009 with PCI to LAD), NSTEMI (12/21 with unsuccessful PCI to LAD), HTN, HLD, VT, GERD, chronic pain syndrome, DDD, thoracic spondylosis with spinal nerve blocks, cluster HA, anxiety/depression      History obtained from chart review due to patient being intubated and sedated  ---------------------------------------------------------------------------------------------------------------------------------------------------------------------  Impressions:  1   CAD s/p CABG x1 (LIMA-->LAD)  2  NSTEMI  3  HTN  4  HLD  5  VT  6  GERD  7  Chronic pain syndrome  8  DDD  9  Thoracic spodnylosis with spinal nerve blocks  10  Cluster HA  11  Anxiety/depression    Plan:    Neuro: D/c continuous sedation  RTC tylenol and prn fentanyl for pain  Trend neuro exam   Delirium precautions  CV: MAP goal >65  SBP goal <130  CI>2 2  Post-op medications: None  Volume resuscitation as needed  Monitor rhythm on telemetry  Epicardial pacing wires for post-op pacing needs  Intra-op ELADIA LVEF 50%  Lung: Check STAT post-op ABG and CXR  Wean vent with spontaneous breathing trial with goal to extubate  GI: GI prophylaxis with PPI  Bowel regimen  Zofran PRN for nausea  FEN: NPO  Replenish K >4 0, mag >2 0 and calcium >7 0  : Check STAT post-op BMP  Oro in place  Monitor UOP with goal >0 5cc/kg/hour  Lasix versus volume resuscitate as needed depending on hemodynamics and volume status  ID: Prophylactic post-op abx  Maintain normothermia  Trend temps  Heme: Check STAT post-op H/H and platelets  Monitor incision site, invasive lines, and chest tube outputs for bleeding  Send coag panel if needed  Endo: Insulin gtt for blood sugar control       Results from last 6 Months   Lab Units 01/07/22  1444 09/30/21  0700   HEMOGLOBIN A1C % 5 3 5 5     Disposition: ICU Care   ---------------------------------------------------------------------------------------------------------------------------------------------------------------------  Historical Information   Past Medical History:   Diagnosis Date    Angina pectoris ([address] )     Anxiety     Arthritis     Back pain     CAD (coronary artery disease)     DDD (degenerative disc disease), lumbosacral     Depressed     Full dentures     GERD (gastroesophageal reflux disease)     Hyperlipidemia     Hypertension     MI (myocardial infarction) ([facility] [address] )     2009    Wears glasses      Past Surgical History:   Procedure Laterality Date    ANGIOPLASTY       LAD    CARDIAC CATHETERIZATION      CARDIAC CATHETERIZATION N/A 2021    Procedure: Cardiac catheterization;  Surgeon: Domingo Patel MD;  Location: [facility] CARD[facility] LAB; Service: Cardiology    CARDIAC CATHETERIZATION N/A 2021    Procedure: Cardiac Coronary Angiogram;  Surgeon: Domingo Patel MD;  [facility]; Service: Cardiology    CARDIAC CATHETERIZATION N/A 2021    Procedure: Cardiac pci;  Surgeon: Dominog Patel MD;  [facility]; Service: Cardiology    NASAL SINUS SURGERY      NE REMOVE ARMPIT LYMPH NODES SUPERFIC Right 2018    Procedure: EXCISION BIOPSY LYMPH NODE AXILLARY RIGHT;  Surgeon: Christina Ayala MD;  Location: [facility];  Service: General     Social History   Social History     Substance and Sexual Activity   Alcohol Use Never    Alcohol/week: 0 0 standard drinks     Social History     Substance and Sexual Activity   Drug Use No     Social History     Tobacco Use   Smoking Status Former Smoker    Quit date: 2008    Years since quittin 1   Smokeless Tobacco Never Used     Family History   Problem Relation Age of Onset    Heart attack Father     Heart disease Brother      I have reviewed this patient's family history and commented on sigificant items within the HPI    ROS: ROS unable to be obtained due to patient being intubated and sedated  Allergies: No Known Allergies    Home Medications:   Prior to Admission medications    Medication Sig Start Date End Date Taking? Authorizing Provider   acetaminophen (TYLENOL) 500 mg tablet Take 500 mg by mouth every 6 (six) hours as needed for mild pain For the pain around eyes    Yes Historical Provider, MD   aspirin 81 MG tablet Take 81 mg by mouth daily     Yes Historical Provider, MD   atorvastatin (LIPITOR) 40 mg tablet Take 0 5 tablets (20 mg total) by mouth daily 20  Yes Chyna Olguin MD   clonazePAM (KlonoPIN) 1 mg tablet TK  T PO BID PRN 8/22/19  Yes Historical Provider, MD   clopidogrel (PLAVIX) 75 mg tablet Take 1 tablet (75 mg total) by mouth daily 12/28/21  Yes Markie Boothe PA-C   ezetimibe (ZETIA) 10 mg tablet TAKE 1 TABLET(10 MG) BY MOUTH DAILY 9/15/21  Yes Liang Sena MD   isosorbide mononitrate (IMDUR) 30 mg 24 hr tablet Take 1 tablet (30 mg total) by mouth daily 12/29/21  Yes Markie Boothe PA-C   lisinopril (ZESTRIL) 40 mg tablet Take 0 5 tablets (20 mg total) by mouth daily 10/5/21  Yes Liang Sena MD   mirtazapine (REMERON) 45 MG tablet Take 45 mg by mouth daily at bedtime  Yes Historical Provider, MD   mupirocin (BACTROBAN) 2 % nasal ointment into each nostril 2 (two) times a day Apply 2 times daily for 5 days prior to procedure 1/7/22  Yes Eileen Abdullahi PA-C   omeprazole (PriLOSEC) 20 mg delayed release capsule Take 20 mg by mouth daily in the early morning     Yes Historical Provider, MD   QUEtiapine (SEROquel) 100 mg tablet Take 100 mg by mouth daily at bedtime     Yes Historical Provider, MD   verapamil (CALAN) 80 mg tablet Take 1 tablet (80 mg total) by mouth every 12 (twelve) hours 9/29/20  Yes Liang Sena MD   VIIBRYD 40 MG tablet Take 40 mg by mouth daily with breakfast   1/22/18  Yes Historical Provider, MD   nitroglycerin (NITROSTAT) 0 4 mg SL tablet PLACE 1 TABLET UNDER THE TONGUE EVERY 5 MINS AS NEEDED FOR CHEST PAIN 10/31/21   Liang Sena MD     Inpatient Medications:  Scheduled Meds:  Current Facility-Administered Medications   Medication Dose Route Frequency Provider Last Rate    acetaminophen  650 mg Rectal Q4H PRN Eileen Abdullahi PA-C      [facility]amin[facility]  9[facility]      amiodarone  200 mg Oral [place], [place]      aspirin  325 mg Oral Daily [place], [place]      atorvastatin  80 mg Oral Daily With EVARISTO Teixeira      bisacodyl  10 mg Rectal Daily PRN Eileen Abdullahi PA-C      calcium chloride  1 g Intravenous Once Eileen Abdullahi PA-C      cefazolin  2,000 mg Intravenous Q8H Calderon Mathews PA-C      chlorhexidine  15 mL Swish & Spit BID Calderon MccartneyMcdaniel, [place]      clonazePAM  0 5 mg Oral BID PRN Calderon Mathews PA-C      ezetimibe  10 mg Oral Daily Calderon Mathews PA-C      fentanyl citrate (PF)  50 mcg Intravenous Q1H PRN Calderon Mathews PA-C      [START ON 1/14/2022] fondaparinux  2 5 mg Subcutaneous Daily Calderon Mathews PA-C      furosemide  40 mg Intravenous Q6H PRN Calderon Mathews PA-C      HYDROmorphone  0 5 mg Intravenous Q2H PRN Calderon Mathews PA-C      insulin regular (HumuLIN R,NovoLIN R) infusion  0 3-21 Units/hr Intravenous Titrated Calderon Mathews PA-C 3 Units/hr (01/13/22 1145)    lactated ringers  500 mL Intravenous Q30 Min PRN Calderon Mathews PA-C      lidocaine (cardiac)  100 mg Intravenous Q30 Min PRN Calderon Mathews PA-C      magnesium sulfate  2 g Intravenous Once Calderon Mathews PA-C      mirtazapine  45 mg Oral HS Calderon Mathews PA-C      mupirocin  1 application Nasal C20C [address] [place], [place]      niCARdipine  2 5-15 mg/hr Intravenous Titrated Calderon Mathews PA-C 5 mg/hr (01/13/22 1145)    ondansetron  4 mg Intravenous Q6H PRN Calderon Mathews PA-C      oxyCODONE  2 5 mg Oral Q4H PRN Calderon Mathews PA-C      oxyCODONE  5 mg Oral Q4H PRN Calderon Mathews PA-C      pantoprazole  40 mg Oral Early Morning Calderon Mathews PA-C      polyethylene glycol  17 g Oral Daily Calderon Mathews PA-C      potassium chloride  20 mEq Intravenous Once PRN Calderon Mathews PA-C      potassium chloride  20 mEq Intravenous Q1H PRN Calderon Mathews, PA-CHE      potassium chloride  20 mEq Intravenous Q30 Min PRN TRINA Del Cid-CHE      QUEtiapine  100 mg Oral HS Calderon Mathews PA-C      sodium chloride  20 mL/hr Intravenous Continuous Calderon Mathews PA-C 20 mL/hr (01/13/22 1200)    [START ON 1/14/2022] vilazodone  40 mg Oral Daily With Breakfast Shabana Rivers PA-C       Continuous Infusions:insulin regular (HumuLIN R,NovoLIN R) infusion, 0 3-21 Units/hr, Last Rate: 3 Units/hr (01/13/22 1145)  niCARdipine, 2 5-15 mg/hr, Last Rate: 5 mg/hr (22 1145)  sodium chloride, 20 mL/hr, Last Rate: 20 mL/hr (22 1200)      PRN Meds:  acetaminophen, 650 mg, Q4H PRN  bisacodyl, 10 mg, Daily PRN  clonazePAM, 0 5 mg, BID PRN  fentanyl citrate (PF), 50 mcg, Q1H PRN  furosemide, 40 mg, Q6H PRN  HYDROmorphone, 0 5 mg, Q2H PRN  lactated ringers, 500 mL, Q30 Min PRN  lidocaine (cardiac), 100 mg, Q30 Min PRN  ondansetron, 4 mg, Q6H PRN  oxyCODONE, 2 5 mg, Q4H PRN  oxyCODONE, 5 mg, Q4H PRN  potassium chloride, 20 mEq, Once PRN  potassium chloride, 20 mEq, Q1H PRN  potassium chloride, 20 mEq, Q30 Min PRN      ---------------------------------------------------------------------------------------------------------------------------------------------------------------------  Vitals:   Vitals:    22 0547 22 1142   BP: 148/80    Pulse: 69 65   Resp: 16    Temp: (!) 97 4 °F (36 3 °C)    TempSrc: Temporal    SpO2: 98% 100%   Weight: 70 8 kg (156 lb)    Height: 5' 10" (1 778 m)      Arterial Line:          Temperature: Temp (24hrs), Av 4 °F (36 3 °C), Min:97 4 °F (36 3 °C), Max:97 4 °F (36 3 °C)  Current: Temperature: (!) 97 4 °F (36 3 °C)    Weights: IBW (Ideal Body Weight): 73 kg  Body mass index is 22 38 kg/m²      Hemodynamic Monitoring:  PAP: 29/13, CVP: 10, CO: 3 5, CI: 1 8, SVR: 942    Ventilator Settings:  Respiratory  Report   Lab Data (Last 4 hours)    None         O2/Vent Data (Last 4 hours)       1142           Vent Mode AC/VC       Resp Rate (BPM) (BPM) 14       Vt (mL) (mL) 500       FIO2 (%) (%) 50       PEEP (cmH2O) (cmH2O) 6       Patient safety screen outcome: Failed       MV 6 38                 Labs:   Results from last 7 days   Lab Units 22  1152 22  1147 22  1115   HEMOGLOBIN g/dL  --  13 9  --    I STAT HEMOGLOBIN g/dl 11 9*  --  11 2*   HEMATOCRIT %  --  40 7  --    HEMATOCRIT, ISTAT % 35*  --  33*     Results from last 7 days   Lab Units 22  1152 22  1147 22  1115 22  1022 22  1022   SODIUM mmol/L  --  142  --   --   --    POTASSIUM mmol/L  --  4 4  --   --   --    CHLORIDE mmol/L  --  114*  --   --   --    CO2 mmol/L  --  27  --   --   --    CO2, I-STAT mmol/L 27  --  28   < > 30   BUN mg/dL  --  11  --   --   --    CREATININE mg/dL  --  0 68  --   --   --    CALCIUM mg/dL  --  8 7  --   --   --    GLUCOSE, ISTAT mg/dl 157*  --  160*  --  138    < > = values in this interval not displayed  Post-op AB 37/44 0/219/0/25 5/100%  Post-op CXR: Lines and tubes appropriately positioned  No noted pneumo/hemothorax  ETT can be advanced 2cm  I have personally reviewed pertinent films in PACS  Post-op EKG: NSR, prolonged QT  This was personally reviewed by myself  Physical Exam  Vitals reviewed  Constitutional:       General: He is not in acute distress  Appearance: He is not diaphoretic  HENT:      Head: Normocephalic and atraumatic  Mouth/Throat:      Comments: ETT present  Eyes:      Pupils: Pupils are equal, round, and reactive to light  Cardiovascular:      Rate and Rhythm: Normal rate and regular rhythm  Heart sounds: Normal heart sounds  No murmur heard  No friction rub  Comments: Sternum intact, dressed with acticoat  CT in place with dressing C/D/I  Pulmonary:      Breath sounds: No wheezing or rales  Comments: Intubated and mechanically ventilated  Breath sounds slightly diminished throughout  Abdominal:      General: Bowel sounds are normal  There is no distension  Palpations: Abdomen is soft  Tenderness: There is no abdominal tenderness  There is no guarding  Musculoskeletal:      Cervical back: Neck supple  Skin:     General: Skin is warm and dry  Capillary Refill: Capillary refill takes less than 2 seconds  Neurological:      Comments: Intubated and sedated       Invasive lines and devices:   Invasive Devices  Report    Central Venous Catheter Line CVC Central Lines 01/13/22 Triple <1 day    Introducer 01/13/22 <1 day          Peripheral Intravenous Line            Peripheral IV 01/13/22 Left Antecubital <1 day          Arterial Line            Arterial Line 01/13/22 Left Radial <1 day          Line            Pacer Wires <1 day    Pacer Wires <1 day          Drain            Chest Tube 1 Left Pleural 32 Fr  <1 day    Chest Tube 2 Posterior Mediastinal 32 Fr  <1 day    Chest Tube 3 Anterior Mediastinal 32 Fr  <1 day    NG/OG/Enteral Tube Orogastric 18 Fr Center mouth <1 day    Urethral Catheter Non-latex;Straight-tip; Temperature probe 16 Fr  <1 day          Airway            ETT  Hi-Lo; Cuffed;Oral 8 mm <1 day              ---------------------------------------------------------------------------------------------------------------------------------------------------------------------  Care Time Delivered:   Upon my evaluation, this patient had a high probability of imminent or life-threatening deterioration due to post-operative open heart, which required my direct attention, intervention, and personal management  I have personally provided 21 minutes (11:30 to 11:51) of critical care time, exclusive of procedures, teaching, family meetings, and any prior time recorded by providers other than myself       SIGNATURE: Breanna Rome PA-C  DATE: January 13, 2022  TIME: 12:37 PM

## 2022-01-14 ENCOUNTER — APPOINTMENT (INPATIENT)
Dept: RADIOLOGY | Facility: HOSPITAL | Age: 57
DRG: 235 | End: 2022-01-14
Payer: MEDICARE

## 2022-01-14 PROBLEM — D72.829 LEUKOCYTOSIS: Status: ACTIVE | Noted: 2022-01-14

## 2022-01-14 LAB
ABO GROUP BLD BPU: NORMAL
ANION GAP SERPL CALCULATED.3IONS-SCNC: 6 MMOL/L (ref 4–13)
BASE EXCESS BLDA CALC-SCNC: -2.6 MMOL/L
BPU ID: NORMAL
BUN SERPL-MCNC: 8 MG/DL (ref 5–25)
CALCIUM SERPL-MCNC: 8.1 MG/DL (ref 8.3–10.1)
CHLORIDE SERPL-SCNC: 106 MMOL/L (ref 100–108)
CO2 SERPL-SCNC: 26 MMOL/L (ref 21–32)
CREAT SERPL-MCNC: 0.55 MG/DL (ref 0.6–1.3)
CROSSMATCH: NORMAL
ERYTHROCYTE [DISTWIDTH] IN BLOOD BY AUTOMATED COUNT: 12.3 % (ref 11.6–15.1)
GFR SERPL CREATININE-BSD FRML MDRD: 114 ML/MIN/1.73SQ M
GLUCOSE SERPL-MCNC: 124 MG/DL (ref 65–140)
GLUCOSE SERPL-MCNC: 128 MG/DL (ref 65–140)
GLUCOSE SERPL-MCNC: 130 MG/DL (ref 65–140)
GLUCOSE SERPL-MCNC: 131 MG/DL (ref 65–140)
GLUCOSE SERPL-MCNC: 131 MG/DL (ref 65–140)
GLUCOSE SERPL-MCNC: 132 MG/DL (ref 65–140)
GLUCOSE SERPL-MCNC: 135 MG/DL (ref 65–140)
GLUCOSE SERPL-MCNC: 164 MG/DL (ref 65–140)
GLUCOSE SERPL-MCNC: 96 MG/DL (ref 65–140)
HCO3 BLDA-SCNC: 21.2 MMOL/L (ref 22–28)
HCT VFR BLD AUTO: 42.3 % (ref 36.5–49.3)
HGB BLD-MCNC: 14.6 G/DL (ref 12–17)
MAGNESIUM SERPL-MCNC: 2.3 MG/DL (ref 1.6–2.6)
MCH RBC QN AUTO: 31 PG (ref 26.8–34.3)
MCHC RBC AUTO-ENTMCNC: 34.5 G/DL (ref 31.4–37.4)
MCV RBC AUTO: 90 FL (ref 82–98)
NASAL CANNULA: 6
O2 CT BLDA-SCNC: 19.9 ML/DL (ref 16–23)
OXYHGB MFR BLDA: 93.3 % (ref 94–97)
PCO2 BLDA: 34.3 MM HG (ref 36–44)
PH BLDA: 7.41 [PH] (ref 7.35–7.45)
PLATELET # BLD AUTO: 182 THOUSANDS/UL (ref 149–390)
PMV BLD AUTO: 10.9 FL (ref 8.9–12.7)
PO2 BLDA: 70.1 MM HG (ref 75–129)
POTASSIUM SERPL-SCNC: 3.9 MMOL/L (ref 3.5–5.3)
RBC # BLD AUTO: 4.71 MILLION/UL (ref 3.88–5.62)
SODIUM SERPL-SCNC: 138 MMOL/L (ref 136–145)
SPECIMEN SOURCE: ABNORMAL
UNIT DISPENSE STATUS: NORMAL
UNIT PRODUCT CODE: NORMAL
UNIT PRODUCT VOLUME: 350 ML
UNIT RH: NORMAL
WBC # BLD AUTO: 15.44 THOUSAND/UL (ref 4.31–10.16)

## 2022-01-14 PROCEDURE — 99222 1ST HOSP IP/OBS MODERATE 55: CPT | Performed by: INTERNAL MEDICINE

## 2022-01-14 PROCEDURE — 85027 COMPLETE CBC AUTOMATED: CPT | Performed by: PHYSICIAN ASSISTANT

## 2022-01-14 PROCEDURE — 83735 ASSAY OF MAGNESIUM: CPT | Performed by: PHYSICIAN ASSISTANT

## 2022-01-14 PROCEDURE — 82948 REAGENT STRIP/BLOOD GLUCOSE: CPT

## 2022-01-14 PROCEDURE — 80048 BASIC METABOLIC PNL TOTAL CA: CPT | Performed by: PHYSICIAN ASSISTANT

## 2022-01-14 PROCEDURE — 94760 N-INVAS EAR/PLS OXIMETRY 1: CPT

## 2022-01-14 PROCEDURE — 99024 POSTOP FOLLOW-UP VISIT: CPT | Performed by: THORACIC SURGERY (CARDIOTHORACIC VASCULAR SURGERY)

## 2022-01-14 PROCEDURE — 93005 ELECTROCARDIOGRAM TRACING: CPT

## 2022-01-14 PROCEDURE — 82805 BLOOD GASES W/O2 SATURATION: CPT | Performed by: PHYSICIAN ASSISTANT

## 2022-01-14 PROCEDURE — 97167 OT EVAL HIGH COMPLEX 60 MIN: CPT

## 2022-01-14 PROCEDURE — 97163 PT EVAL HIGH COMPLEX 45 MIN: CPT

## 2022-01-14 PROCEDURE — 71045 X-RAY EXAM CHEST 1 VIEW: CPT

## 2022-01-14 RX ORDER — TEMAZEPAM 15 MG/1
15 CAPSULE ORAL
Status: DISCONTINUED | OUTPATIENT
Start: 2022-01-14 | End: 2022-01-16 | Stop reason: HOSPADM

## 2022-01-14 RX ORDER — ONDANSETRON 2 MG/ML
4 INJECTION INTRAMUSCULAR; INTRAVENOUS EVERY 6 HOURS PRN
Status: DISCONTINUED | OUTPATIENT
Start: 2022-01-14 | End: 2022-01-16 | Stop reason: HOSPADM

## 2022-01-14 RX ORDER — DOCUSATE SODIUM 100 MG/1
100 CAPSULE, LIQUID FILLED ORAL 2 TIMES DAILY
Status: DISCONTINUED | OUTPATIENT
Start: 2022-01-14 | End: 2022-01-16 | Stop reason: HOSPADM

## 2022-01-14 RX ORDER — FONDAPARINUX SODIUM 2.5 MG/.5ML
2.5 INJECTION SUBCUTANEOUS DAILY
Status: DISCONTINUED | OUTPATIENT
Start: 2022-01-14 | End: 2022-01-14

## 2022-01-14 RX ORDER — KETOROLAC TROMETHAMINE 30 MG/ML
15 INJECTION, SOLUTION INTRAMUSCULAR; INTRAVENOUS EVERY 6 HOURS PRN
Status: COMPLETED | OUTPATIENT
Start: 2022-01-14 | End: 2022-01-15

## 2022-01-14 RX ORDER — OXYCODONE HYDROCHLORIDE 10 MG/1
10 TABLET ORAL EVERY 4 HOURS PRN
Status: DISCONTINUED | OUTPATIENT
Start: 2022-01-14 | End: 2022-01-16 | Stop reason: HOSPADM

## 2022-01-14 RX ORDER — ASPIRIN 81 MG/1
81 TABLET, CHEWABLE ORAL DAILY
Status: DISCONTINUED | OUTPATIENT
Start: 2022-01-14 | End: 2022-01-16 | Stop reason: HOSPADM

## 2022-01-14 RX ORDER — FUROSEMIDE 10 MG/ML
20 INJECTION INTRAMUSCULAR; INTRAVENOUS 2 TIMES DAILY
Status: DISCONTINUED | OUTPATIENT
Start: 2022-01-14 | End: 2022-01-15

## 2022-01-14 RX ORDER — POTASSIUM CHLORIDE 20 MEQ/1
20 TABLET, EXTENDED RELEASE ORAL 2 TIMES DAILY
Status: DISCONTINUED | OUTPATIENT
Start: 2022-01-14 | End: 2022-01-15

## 2022-01-14 RX ORDER — CLOPIDOGREL BISULFATE 75 MG/1
75 TABLET ORAL DAILY
Status: DISCONTINUED | OUTPATIENT
Start: 2022-01-14 | End: 2022-01-16 | Stop reason: HOSPADM

## 2022-01-14 RX ORDER — ACETAMINOPHEN 325 MG/1
975 TABLET ORAL EVERY 8 HOURS
Status: DISCONTINUED | OUTPATIENT
Start: 2022-01-14 | End: 2022-01-14

## 2022-01-14 RX ORDER — PANTOPRAZOLE SODIUM 40 MG/1
40 TABLET, DELAYED RELEASE ORAL DAILY
Status: DISCONTINUED | OUTPATIENT
Start: 2022-01-14 | End: 2022-01-14

## 2022-01-14 RX ADMIN — INSULIN LISPRO 1 UNITS: 100 INJECTION, SOLUTION INTRAVENOUS; SUBCUTANEOUS at 12:05

## 2022-01-14 RX ADMIN — NICARDIPINE HYDROCHLORIDE 15 MG/HR: 25 INJECTION, SOLUTION INTRAVENOUS at 01:15

## 2022-01-14 RX ADMIN — CEFAZOLIN SODIUM 2000 MG: 2 SOLUTION INTRAVENOUS at 01:20

## 2022-01-14 RX ADMIN — KETOROLAC TROMETHAMINE 15 MG: 30 INJECTION, SOLUTION INTRAMUSCULAR at 17:39

## 2022-01-14 RX ADMIN — CLOPIDOGREL BISULFATE 75 MG: 75 TABLET ORAL at 08:32

## 2022-01-14 RX ADMIN — POTASSIUM CHLORIDE 20 MEQ: 1500 TABLET, EXTENDED RELEASE ORAL at 08:31

## 2022-01-14 RX ADMIN — OXYCODONE HYDROCHLORIDE 10 MG: 10 TABLET ORAL at 07:19

## 2022-01-14 RX ADMIN — DOCUSATE SODIUM 100 MG: 100 CAPSULE ORAL at 08:31

## 2022-01-14 RX ADMIN — DOCUSATE SODIUM 100 MG: 100 CAPSULE ORAL at 17:40

## 2022-01-14 RX ADMIN — POLYETHYLENE GLYCOL 3350 17 G: 17 POWDER, FOR SOLUTION ORAL at 08:33

## 2022-01-14 RX ADMIN — FUROSEMIDE 20 MG: 10 INJECTION, SOLUTION INTRAMUSCULAR; INTRAVENOUS at 08:33

## 2022-01-14 RX ADMIN — CHLORHEXIDINE GLUCONATE 15 ML: 1.2 SOLUTION ORAL at 08:32

## 2022-01-14 RX ADMIN — AMIODARONE HYDROCHLORIDE 200 MG: 200 TABLET ORAL at 05:08

## 2022-01-14 RX ADMIN — AMIODARONE HYDROCHLORIDE 200 MG: 200 TABLET ORAL at 13:46

## 2022-01-14 RX ADMIN — CHLORHEXIDINE GLUCONATE 15 ML: 1.2 SOLUTION ORAL at 21:45

## 2022-01-14 RX ADMIN — AMIODARONE HYDROCHLORIDE 200 MG: 200 TABLET ORAL at 21:41

## 2022-01-14 RX ADMIN — KETOROLAC TROMETHAMINE 15 MG: 30 INJECTION, SOLUTION INTRAMUSCULAR at 10:43

## 2022-01-14 RX ADMIN — HYDRALAZINE HYDROCHLORIDE 5 MG: 20 INJECTION, SOLUTION INTRAMUSCULAR; INTRAVENOUS at 03:51

## 2022-01-14 RX ADMIN — MIRTAZAPINE 45 MG: 30 TABLET, FILM COATED ORAL at 21:40

## 2022-01-14 RX ADMIN — PANTOPRAZOLE SODIUM 40 MG: 40 TABLET, DELAYED RELEASE ORAL at 05:08

## 2022-01-14 RX ADMIN — LABETALOL HYDROCHLORIDE 10 MG: 5 INJECTION, SOLUTION INTRAVENOUS at 05:39

## 2022-01-14 RX ADMIN — CEFAZOLIN SODIUM 2000 MG: 2 SOLUTION INTRAVENOUS at 10:23

## 2022-01-14 RX ADMIN — METOPROLOL TARTRATE 25 MG: 25 TABLET, FILM COATED ORAL at 21:41

## 2022-01-14 RX ADMIN — EZETIMIBE 10 MG: 10 TABLET ORAL at 08:33

## 2022-01-14 RX ADMIN — ACETAMINOPHEN 975 MG: 325 TABLET, FILM COATED ORAL at 03:51

## 2022-01-14 RX ADMIN — ACETAMINOPHEN 975 MG: 325 TABLET, FILM COATED ORAL at 11:59

## 2022-01-14 RX ADMIN — NICARDIPINE HYDROCHLORIDE 15 MG/HR: 25 INJECTION, SOLUTION INTRAVENOUS at 00:47

## 2022-01-14 RX ADMIN — FUROSEMIDE 20 MG: 10 INJECTION, SOLUTION INTRAMUSCULAR; INTRAVENOUS at 17:39

## 2022-01-14 RX ADMIN — FONDAPARINUX SODIUM 2.5 MG: 2.5 INJECTION, SOLUTION SUBCUTANEOUS at 08:33

## 2022-01-14 RX ADMIN — NICARDIPINE HYDROCHLORIDE 15 MG/HR: 25 INJECTION, SOLUTION INTRAVENOUS at 05:14

## 2022-01-14 RX ADMIN — METOPROLOL TARTRATE 25 MG: 25 TABLET, FILM COATED ORAL at 08:31

## 2022-01-14 RX ADMIN — ASPIRIN 81 MG CHEWABLE TABLET 81 MG: 81 TABLET CHEWABLE at 08:30

## 2022-01-14 RX ADMIN — ACETAMINOPHEN 975 MG: 325 TABLET, FILM COATED ORAL at 21:41

## 2022-01-14 RX ADMIN — MUPIROCIN 1 APPLICATION: 20 OINTMENT TOPICAL at 08:32

## 2022-01-14 RX ADMIN — ATORVASTATIN CALCIUM 80 MG: 80 TABLET, FILM COATED ORAL at 17:40

## 2022-01-14 RX ADMIN — HYDROMORPHONE HYDROCHLORIDE 0.5 MG: 1 INJECTION, SOLUTION INTRAMUSCULAR; INTRAVENOUS; SUBCUTANEOUS at 01:23

## 2022-01-14 NOTE — UTILIZATION REVIEW
Initial Clinical Review    Elective Inpatient surgical procedure  Age/Sex: 60 y o  male  Surgery Date: 1/13/22  Procedure: Coronary artery bypass grafting x 1 with left internal mammary artery to left anterior descending  Anesthesia:General endotracheal anesthesia with transesophageal echocardiogram guidance  Operative Findings: Final transesophageal echocardiogram demonstrated normal biventricular function          POD#1 Progress Note:  Extubated, Delined  Overnight, pt maxed out on Cardene 15mg  Given 1 time doses of Hydralazine and Labetalol  Currently on Cardene 4mg and Insulin infusion  Complains of significant incisional chest pain  Sternum is stable  Incision dressed with Acticoat  No erythema or drainage  Breath sounds diminished in the bases bilaterally  and Poor inspiratory effort  02 2LNC, mediastinal Ct x 2 & pleural CT x1  to suction  Admission Orders: Date/Time/Statement:   Admission Orders (From admission, onward)     Ordered        01/13/22 0640  Inpatient Admission  Once                      Orders Placed This Encounter   Procedures    Inpatient Admission     Standing Status:   Standing     Number of Occurrences:   1     Order Specific Question:   Level of Care     Answer:   Critical Care [15]     Order Specific Question:   Estimated length of stay     Answer:   More than 2 Midnights     Order Specific Question:   Certification     Answer:   I certify that inpatient services are medically necessary for this patient for a duration of greater than two midnights  See H&P and MD Progress Notes for additional information about the patient's course of treatment       Vital Signs: /68 (BP Location: Right arm)   Pulse 95   Temp 98 4 °F (36 9 °C) (Oral)   Resp 16   Ht 5' 10" (1 778 m)   Wt 72 9 kg (160 lb 11 5 oz)   SpO2 92%   BMI 23 06 kg/m²     Pertinent Labs/Diagnostic Test Results:   1/14/22 CXR: Interval extubation and removal of the [place] catheter with persistent mediastinal drain and left basilar chest tube in this patient had recent CABG  1/13/22 CXR: Postoperative changes      Results from last 7 days   Lab Units 01/14/22  0357 01/13/22  1945 01/13/22  1152 01/13/22  1147 01/13/22  1115 01/13/22  1022 01/13/22  1021   WBC Thousand/uL 15 44*  --   --   --   --   --   --    HEMOGLOBIN g/dL 14 6 15 0  --  13 9  --   --   --    I STAT HEMOGLOBIN g/dl  --   --  11 9*  --  11 2*   < >  --    HEMATOCRIT % 42 3 42 8  --  40 7  --   --   --    HEMATOCRIT, ISTAT %  --   --  35*  --  33*   < >  --    PLATELETS Thousands/uL 182  --   --   --   --   --  210    < > = values in this interval not displayed  Results from last 7 days   Lab Units 01/14/22 0357 01/13/22  1945 01/13/22  1604 01/13/22  1152 01/13/22  1147 01/13/22  1115 01/13/22  1022 01/13/22  1021 01/13/22  1011 01/13/22  1011 01/13/22  0939 01/13/22  0939   SODIUM mmol/L 138  --   --   --  142  --   --   --   --   --   --   --    POTASSIUM mmol/L 3 9 4 5 3 9  --  4 4  --   --   --   --   --   --   --    CHLORIDE mmol/L 106  --   --   --  114*  --   --   --   --   --   --   --    CO2 mmol/L 26  --   --   --  27  --   --   --   --   --   --   --    CO2, I-STAT mmol/L  --   --   --  27  --  28 30  --    < > 31   < > 29   ANION GAP mmol/L 6  --   --   --  1*  --   --   --   --   --   --   --    BUN mg/dL 8  --   --   --  11  --   --   --   --   --   --   --    CREATININE mg/dL 0 55*  --   --   --  0 68  --   --   --   --   --   --   --    EGFR ml/min/1 73sq m 114  --   --   --  105  --   --   --   --   --   --   --    CALCIUM mg/dL 8 1*  --   --   --  8 7  --   --   --   --   --   --   --    CALCIUM, IONIZED mmol/L  --   --   --   --   --   --   --  1 01*  --   --   --   --    CALCIUM, IONIZED, ISTAT mmol/L  --   --   --   --   --  1 27 1 03*  --   --  0 97*  --  1 23   MAGNESIUM mg/dL 2 3  --   --   --   --   --   --   --   --   --   --   --     < > = values in this interval not displayed           Results from last 7 days   Lab Units 01/14/22  1105 01/14/22  0753 01/14/22  0609 01/14/22  0420 01/14/22  0149 01/13/22  2351 01/13/22  2157 01/13/22 2002 01/13/22  1947 01/13/22  1800 01/13/22  1604 01/13/22  1401   POC GLUCOSE mg/dl 164* 96 135 131 128 123 130 123 121 133 123 122     Results from last 7 days   Lab Units 01/14/22  0357 01/13/22  1147   GLUCOSE RANDOM mg/dL 131 145*         Results from last 7 days   Lab Units 01/14/22  0357   PH ART  7 409   PCO2 ART mm Hg 34 3*   PO2 ART mm Hg 70 1*   HCO3 ART mmol/L 21 2*   BASE EXC ART mmol/L -2 6   O2 CONTENT ART mL/dL 19 9   O2 HGB, ARTERIAL % 93 3*   ABG SOURCE  Line, Arterial         Results from last 7 days   Lab Units 01/13/22  1152 01/13/22  1115 01/13/22  1022   I STAT BASE EXC mmol/L 0 1 3   I STAT O2 SAT % 100* 100* 100*   ISTAT PH ART  7 371 7 395 7 392   I STAT ART PCO2 mm HG 44 0 43 2 47 0*   I STAT ART PO2 mm  0* 246 0* 303 0*   I STAT ART HCO3 mmol/L 25 5 26 5 28 6*     Diet:Cardiovascular, 1800 ML fluid restriction     Mobility: up as tolerated,   DVT Prophylaxis: SCD/Arixtra sq  Medications/Pain Control:   Scheduled Medications:  acetaminophen, 975 mg, Oral, Q8H  amiodarone, 200 mg, Oral, Q8H [facility]  [MAR Hold] aspirin, 81 mg, Oral, Daily  atorvastatin, 80 mg, Oral, Daily With Dinner  chlorhexidine, 15 mL, Swish & Spit, BID  [MAR Hold] clopidogrel, 75 mg, Oral, Daily  docusate sodium, 100 mg, Oral, BID  ezetimibe, 10 mg, Oral, Daily  fondaparinux, 2 5 mg, Subcutaneous, Daily  furosemide, 20 mg, Intravenous, BID  insulin lispro, 1-5 Units, Subcutaneous, HS  insulin lispro, 1-5 Units, Subcutaneous, TID AC  metoprolol tartrate, 25 mg, Oral, Q12H ASHU  mirtazapine, 45 mg, Oral, HS  mupirocin, 1 application, Nasal, Y22I ASHU  pantoprazole, 40 mg, Oral, Early Morning  polyethylene glycol, 17 g, Oral, Daily  potassium chloride, 20 mEq, Oral, BID  QUEtiapine, 100 mg, Oral, HS  vilazodone, 40 mg, Oral, Daily With Breakfast      Continuous IV Infusions:none     PRN Meds:  acetaminophen, 650 mg, Rectal, Q4H PRN  bisacodyl, 10 mg, Rectal, Daily PRN  clonazePAM, 0 5 mg, Oral, BID PRN  ketorolac, 15 mg, Intravenous, Q6H PRN  lidocaine (cardiac), 100 mg, Intravenous, Q30 Min PRN  ondansetron, 4 mg, Intravenous, Q6H PRN  [MAR Hold] oxyCODONE, 10 mg, Oral, Q4H PRN  oxyCODONE, 5 mg, Oral, Q4H PRN  temazepam, 15 mg, Oral, HS PRN        Network Utilization Review Department  ATTENTION: Please call with any questions or concerns to [phone] and carefully listen to the prompts so that you are directed to the right person  All voicemails are confidential   Bucky Lara all requests for admission clinical reviews, approved or denied determinations and any other requests to dedicated fax number below belonging to the campus where the patient is receiving treatment   List of dedicated fax numbers for the Facilities:  [address] DENIALS (Administrative/Medical Necessity) [phone]   [address] (Maternity/NICU/Pediatrics) [phone]   [address] [place] [address]  [address] [place] [address] [address] [address] [address] [facility] [facility] [phone]

## 2022-01-14 NOTE — PLAN OF CARE
Problem: OCCUPATIONAL THERAPY ADULT  Goal: Performs self-care activities at highest level of function for planned discharge setting  See evaluation for individualized goals  Description: Treatment Interventions: ADL retraining,UE strengthening/ROM,Endurance training,Patient/family training,Equipment evaluation/education,Compensatory technique education,Continued evaluation,Cardiac education,Activityengagement,Energy conservation          See flowsheet documentation for full assessment, interventions and recommendations  Note: Limitation: Decreased ADL status,Decreased endurance,Decreased high-level ADLs,Decreased self-care trans  Prognosis: Good  Assessment: Pt is a 60 y o  male admitted to [facility] on 1/13/2022 w/ CAD s/p CABG x1 on 1/13/2022  Pt  has a past medical history of Angina pectoris ([facility] [address] ), Anxiety, Arthritis, Back pain, CAD (coronary artery disease), DDD (degenerative disc disease), lumbosacral, Depressed, Full dentures, GERD (gastroesophageal reflux disease), Hyperlipidemia, Hypertension, MI (myocardial infarction) ([facility] [address] ), and Wears glasses  Pt with active OT orders and ambulate  orders  Pt resides in a 2 story home with 3 FAY  Pt lives with family who are able to assist as needed upon d/c  Pt was I w/  ADLS and IADLS, (+) drove, & required no use of DME PTA  Currently pt is mod A for functional transfers and functional mobility, min A for UB ADLS and mod A for LB ADLS  Pt is limited at this time 2*: pain, endurance, activity tolerance, functional mobility, forward functional reach, functional standing tolerance and decreased I w/ ADLS/IADLS  The following Occupational Performance Areas to address include: grooming, bathing/shower, toilet hygiene, dressing, functional mobility and clothing management  Based on the aforementioned OT evaluation, functional performance deficits, and assessments, pt has been identified as a high complexity evaluation   From OT standpoint, anticipate d/c home with family support  Pt to continue to benefit from acute immediate OT services to address the following goals 3-5x/week to  w/in 10-14 days:        OT Discharge Recommendation: No rehabilitation needs (Home with increased social support)  OT - OK to Discharge: Yes (When medically appropriate)

## 2022-01-14 NOTE — CASE MANAGEMENT
Case Management Progress Note    Patient name Jessica Us  Location [facility] 429/[facility] 429-01 MRN [id number]  : 1965 Date 2022       Current Admission Date: 2022  Current Admission Diagnosis:Coronary artery disease involving native coronary artery of native heart with angina pectoris ([facility] [address] )      LOS (days): 1  Geometric Mean LOS (GMLOS) (days): 6 00  Days to GMLOS:4 8     OBJECTIVE:  Risk of Unplanned Readmission Score: 12      Current admission status: Inpatient     Primary Insurance: [employer] [facility]  Secondary Insurance: [id number] DETAILS:    Discharge planning discussed with[de-identified] Patient  Freedom of Choice: Yes     Were Treatment Team discharge recommendations reviewed with patient/caregiver?: Yes  Did patient/caregiver verbalize understanding of patient care needs?: Yes  Were patient/caregiver advised of the risks associated with not following Treatment Team discharge recommendations?: Yes    [facility]         Is the patient interested in [facility] at discharge?: Yes  [address] requested[de-identified] [address] Name[de-identified] [facility] Provider[de-identified] PCP  [address] Needed[de-identified] Post-Op Care and Assessment  Homebound Criteria Met[de-identified] Requires the Assistance of Another Person for Safe Ambulation or to Leave the Home  Supporting Clincal Findings[de-identified] Limited Endurance    Treatment Team Recommendation: Home with  [place]  Discharge Destination Plan[de-identified] Home with  [place]    Additional Comments: Pt is S/P CABG x 1 and is recommended to have in-home post-op skilled nursing care via a VNA for his aftercare plan  CM spoke to pt about this aftercare recommendation  Pt is agreeable w/ recommendation  CM provided pt w/ freedom of choice for VNA referrals  Pt requested referral to [facility]  CM made Ecin referral to [facility]  CM to follow

## 2022-01-14 NOTE — CONSULTS
Consultation - Cardiology Team One  [facility] 60 y o  male MRN: [id number]  Unit/Bed#: [facility] 414-01 Encounter: [id number]    Inpatient consult to Cardiology  Consult performed by: JOHNNIE Miranda  Consult ordered by: [facility], EVARISTO      Physician Requesting Consult: Lauren Ruiz MD  Reason for Consult / Principal Problem: s/p CABG    Assessment/ Plan    1  CAD s/p CABG x 1 with LIMA to LAD 1/13/22  POD #1  BP elevated overnight, weaned off Cardene gtt this morning  C/o significant incisional/chest pain and SOB- +friction rub on exam  Encouraged patient to take the Toradol ordered by CT surgery  Post op rhythm management- amiodarone 200 mg TID  ECG- NSR with nonspecific ST/T wave abnormality  Tele reviewed- NSR  Post op volume management- started on furosemide 20 mg IV BID  Follow I/Os, daily weights, renal function  Continue ASA, Plavix, statin, and beta blocker  Will follow up with Dr Faina Hernández at discharge     2  Hx of anterior STEMI s/p PCI of LAD 2009 and NSTEMI s/p PCI of proximal LAD 12/2021  See plan for #1     3  Hx of ischemic cardiomyopathy with improved LV function  LVEF 2019- 45%; pre op echo showed EF 55%  Final intra op ELADIA yesterday- LVEF 50%  Beta blocker started today     4  HTN- average /58  Weaned off cardene gtt earlier today     5  HLD- LDL 63, continue atorvastatin 80 mg daily  History of Present Illness   HPI: [facility] is a 60y o  year old male with CAD s/p anterior MI s/p PCI of LAD 2009, hx of ischemic cardiomyopathy with recovered LV function EF 55%, HTN, and HLD who follows with cardiologist Dr Faina Hernández  His last office visit was in October 2021 at which time he complained of some exertional dyspnea  A stress test was ordered but not yet completed when he presented to the ED at [facility] on 12/24/21 with c/o chest pain  He was diagnosed with an NSTEMI and cardiac catheterization on 12/27 showed a 95% proximal LAD lesion   A stent was placed but after stent deployment, a new 95% ostial LAD lesion was identified  It was recommended he be transferred to [facility] for CT surgery evaluation for LIMA to LAD  The patient left AMA before he could be transferred to [facility]  He followed up with CT surgery in the office and underwent pre-operative testing  He presented to [facility] for CABG x 1 with LIMA to LAD on 1/13/22  He is POD # 1 and general cardiology is consulted as part of routine post operative management  He is c/o significant incisional pain and remains on Cardene  He has been weaned to [facility]  IV diuretics and beta blocker were initiated this morning and renal function is stable post operatively  EKG reviewed personally: NSR with nonspecific ST/T wave abnormality    Telemetry reviewed personally: NSR    Review of Systems   Constitutional: Positive for decreased appetite  Negative for chills, malaise/fatigue and weight gain  Cardiovascular: Positive for chest pain and dyspnea on exertion  Negative for leg swelling, orthopnea, palpitations and syncope  Respiratory: Positive for shortness of breath  Negative for cough, sleep disturbances due to breathing and sputum production  Gastrointestinal: Negative for bloating, nausea and vomiting  Neurological: Negative for dizziness, light-headedness and weakness  Psychiatric/Behavioral: Negative for altered mental status  All other systems reviewed and are negative      Historical Information   Past Medical History:   Diagnosis Date    Angina pectoris ([address] )     Anxiety     Arthritis     Back pain     CAD (coronary artery disease)     DDD (degenerative disc disease), lumbosacral     Depressed     Full dentures     GERD (gastroesophageal reflux disease)     Hyperlipidemia     Hypertension     MI (myocardial infarction) ([address] )     2009    Wears glasses      Past Surgical History:   Procedure Laterality Date    ANGIOPLASTY  2009     LAD    CARDIAC CATHETERIZATION      CARDIAC CATHETERIZATION N/A 12/27/2021 Procedure: Cardiac catheterization;  Surgeon: Janneth Green MD;  [facility]: [facility]; Service: Cardiology    CARDIAC CATHETERIZATION N/A 2021    Procedure: Cardiac Coronary Angiogram;  Surgeon: Janneth Green MD;  [facility]; Service: Cardiology    CARDIAC CATHETERIZATION N/A 2021    Procedure: Cardiac pci;  Surgeon: Janneth Green MD;  [facility]; Service: Cardiology    NASAL SINUS SURGERY      [place] CABG, ARTERY-VEIN, SINGLE N/A 2022    Procedure: CORONARY ARTERY BYPASS GRAFT (CABG) x1- LIMA to LAD;  Surgeon: Pratibha Garcia MD;  [facility];  Service: Cardiac Surgery    [place] ECHO TRANSESOPHAG [address] N/A 2022    Procedure: TRANSESOPHAGEAL ECHOCARDIOGRAM (ELADIA);   Surgeon: Pratibha Garcia MD;  [facility];  Service: Cardiac Surgery    [place] REMOVE ARMPIT LYMPH NODES [address] Right 2018    Procedure: EXCISION BIOPSY LYMPH NODE AXILLARY RIGHT;  Surgeon: Justin Madrigal MD;  Location: [facility];  Service: General     Social History     Substance and Sexual Activity   Alcohol Use Never    Alcohol/week: 0 0 standard drinks     Social History     Substance and Sexual Activity   Drug Use No     Social History     Tobacco Use   Smoking Status Former Smoker    Quit date: 2008    Years since quittin 1   Smokeless Tobacco Never Used     Family History:   Family History   Problem Relation Age of Onset    Heart attack Father     Heart disease Brother        Meds/Allergies   all current active meds have been reviewed and current meds:   Current Facility-Administered Medications   Medication Dose Route Frequency    acetaminophen (TYLENOL) rectal suppository 650 mg  650 mg Rectal Q4H PRN    acetaminophen (TYLENOL) tablet 975 mg  975 mg Oral Q8H    amiodarone tablet 200 mg  200 mg Oral Q8H [address]    aspirin chewable tablet 81 mg  81 mg Oral Daily    atorvastatin (LIPITOR) tablet 80 mg  80 mg Oral Daily With Dinner    bisacodyl (DULCOLAX) rectal suppository 10 mg  10 mg Rectal Daily PRN    ceFAZolin (ANCEF) IVPB (premix in dextrose) 2,000 mg 50 mL  2,000 mg Intravenous Q8H    chlorhexidine (PERIDEX) 0 12 % oral rinse 15 mL  15 mL Swish & Spit BID    clonazePAM (KlonoPIN) tablet 0 5 mg  0 5 mg Oral BID PRN    clopidogrel (PLAVIX) tablet 75 mg  75 mg Oral Daily    docusate sodium (COLACE) capsule 100 mg  100 mg Oral BID    ezetimibe (ZETIA) tablet 10 mg  10 mg Oral Daily    fondaparinux (ARIXTRA) subcutaneous injection 2 5 mg  2 5 mg Subcutaneous Daily    furosemide (LASIX) injection 20 mg  20 mg Intravenous BID    insulin lispro (HumaLOG) 100 units/mL subcutaneous injection 1-5 Units  1-5 Units Subcutaneous HS    insulin lispro (HumaLOG) 100 units/mL subcutaneous injection 1-5 Units  1-5 Units Subcutaneous TID AC    ketorolac (TORADOL) injection 15 mg  15 mg Intravenous Q6H PRN    lidocaine (cardiac) injection 100 mg  100 mg Intravenous Q30 Min PRN    metoprolol tartrate (LOPRESSOR) tablet 25 mg  25 mg Oral Q12H ASHU    mirtazapine (REMERON) tablet 45 mg  45 mg Oral HS    mupirocin (BACTROBAN) 2 % nasal ointment 1 application  1 application Nasal V88R [facility]    ondansetron (ZOFRAN) injection 4 mg  4 mg Intravenous Q6H PRN    oxyCODONE (ROXICODONE) immediate release tablet 10 mg  10 mg Oral Q4H PRN    oxyCODONE (ROXICODONE) IR tablet 5 mg  5 mg Oral Q4H PRN    pantoprazole (PROTONIX) EC tablet 40 mg  40 mg Oral Early Morning    polyethylene glycol (MIRALAX) packet 17 g  17 g Oral Daily    potassium chloride (K-DUR,KLOR-CON) CR tablet 20 mEq  20 mEq Oral BID    QUEtiapine (SEROquel) tablet 100 mg  100 mg Oral HS    temazepam (RESTORIL) capsule 15 mg  15 mg Oral HS PRN    vilazodone (VIIBRYD) tablet 40 mg  40 mg Oral Daily With Breakfast          No Known Allergies    Objective   Vitals: Blood pressure 142/55, pulse 100, temperature 97 9 °F (36 6 °C), temperature source Oral, resp  rate (!) 25, height 5' 10" (1 778 m), weight 72 9 kg (160 lb 11 5 oz), SpO2 93 %  , Body mass index is 23 06 kg/m²  ,     Systolic (66VSE), RPO:878 , Min:142 , SEC:851     Diastolic (78JQQ), PNZ:36, Min:55, Max:60        Intake/Output Summary (Last 24 hours) at 1/14/2022 1006  Last data filed at 1/14/2022 0800  Gross per 24 hour   Intake 5381 37 ml   Output 5160 ml   Net 221 37 ml     Wt Readings from Last 3 Encounters:   01/14/22 72 9 kg (160 lb 11 5 oz)   01/07/22 72 1 kg (159 lb)   12/27/21 74 4 kg (164 lb)     Invasive Devices  Report    Central Venous Catheter Line            CVC Central Lines 01/13/22 Triple 1 day          Peripheral Intravenous Line            Peripheral IV 01/13/22 Left Antecubital 1 day          Arterial Line            Arterial Line 01/13/22 Left Radial 1 day          Line            Pacer Wires <1 day    Pacer Wires <1 day          Drain            Chest Tube 1 Left Pleural 32 Fr  1 day    Chest Tube 2 Posterior Mediastinal 32 Fr  1 day    Urethral Catheter Non-latex;Straight-tip; Temperature probe 16 Fr  1 day    Chest Tube 3 Anterior Mediastinal 32 Fr  <1 day                Physical Exam  Vitals reviewed  Constitutional:       General: He is not in acute distress  Neck:      Vascular: No hepatojugular reflux or JVD  Cardiovascular:      Rate and Rhythm: Normal rate and regular rhythm  Pulses: Normal pulses  Heart sounds: No murmur heard  Friction rub present  No gallop  Pulmonary:      Effort: No respiratory distress  Breath sounds: No rales  Abdominal:      General: Bowel sounds are normal  There is no distension  Palpations: Abdomen is soft  Tenderness: There is no abdominal tenderness  Musculoskeletal:         General: No tenderness  Normal range of motion  Cervical back: Neck supple  Right lower leg: Edema (trace) present  Left lower leg: Edema (trace) present  Skin:     General: Skin is warm and dry        Capillary Refill: Capillary refill takes less than 2 seconds  Findings: No erythema  Comments: Mid sternal incision dressing C/D/I   Neurological:      Mental Status: He is alert and oriented to person, place, and time  LABORATORY RESULTS:      CBC with diff:   Results from last 7 days   Lab Units 01/14/22  0357 01/13/22  1945 01/13/22  1152 01/13/22  1147 01/13/22  1115 01/13/22  1022 01/13/22  1021 01/13/22  1011   WBC Thousand/uL 15 44*  --   --   --   --   --   --   --    HEMOGLOBIN g/dL 14 6 15 0  --  13 9  --   --   --   --    I STAT HEMOGLOBIN g/dl  --   --  11 9*  --  11 2* 8 8*  --  9 2*   HEMATOCRIT % 42 3 42 8  --  40 7  --   --   --   --    HEMATOCRIT, ISTAT %  --   --  35*  --  33* 26*  --  27*   MCV fL 90  --   --   --   --   --   --   --    PLATELETS Thousands/uL 182  --   --   --   --   --  210  --    MCH pg 31 0  --   --   --   --   --   --   --    MCHC g/dL 34 5  --   --   --   --   --   --   --    RDW % 12 3  --   --   --   --   --   --   --    MPV fL 10 9  --   --   --   --   --  11 3  --        CMP:  Results from last 7 days   Lab Units 01/14/22 0357 01/13/22 1945 01/13/22  1604 01/13/22  1152 01/13/22  1147 01/13/22  1115 01/13/22  1022 01/13/22  1011 01/13/22  0939 01/13/22  0756 01/13/22  0756   POTASSIUM mmol/L 3 9 4 5 3 9  --  4 4  --   --   --   --   --   --    CHLORIDE mmol/L 106  --   --   --  114*  --   --   --   --   --   --    CO2 mmol/L 26  --   --   --  27  --   --   --   --   --   --    CO2, I-STAT mmol/L  --   --   --  27  --  28 30 31 29   < > 29   BUN mg/dL 8  --   --   --  11  --   --   --   --   --   --    CREATININE mg/dL 0 55*  --   --   --  0 68  --   --   --   --   --   --    GLUCOSE, ISTAT mg/dl  --   --   --  157*  --  160* 138 129 148*  --  110   CALCIUM mg/dL 8 1*  --   --   --  8 7  --   --   --   --   --   --    EGFR ml/min/1 73sq m 114  --   --   --  105  --   --   --   --   --   --     < > = values in this interval not displayed         BMP:  Results from last 7 days   Lab Units 01/14/22  0357 01/13/22  1945 01/13/22  1604 01/13/22  1152 01/13/22  1147 01/13/22  1115 01/13/22  1115 01/13/22  1022 01/13/22  1022 01/13/22  1011 01/13/22  1011 01/13/22  0939 01/13/22  0939   POTASSIUM mmol/L 3 9 4 5 3 9  --  4 4  --   --   --   --   --   --   --   --    CHLORIDE mmol/L 106  --   --   --  114*  --   --   --   --   --   --   --   --    CO2 mmol/L 26  --   --   --  27  --   --   --   --   --   --   --   --    CO2, I-STAT mmol/L  --   --   --  27  --   --  28  --  30  --  31  --  29   BUN mg/dL 8  --   --   --  11  --   --   --   --   --   --   --   --    CREATININE mg/dL 0 55*  --   --   --  0 68  --   --   --   --   --   --   --   --    GLUCOSE, ISTAT mg/dl  --   --   --  157*  --    < > 160*   < > 138   < > 129   < > 148*   CALCIUM mg/dL 8 1*  --   --   --  8 7  --   --   --   --   --   --   --   --     < > = values in this interval not displayed  Lab Results   Component Value Date    CREATININE 0 55 (L) 01/14/2022    CREATININE 0 68 01/13/2022    CREATININE 0 66 12/28/2021       No results found for: NTBNP       Results from last 7 days   Lab Units 01/14/22  0357   MAGNESIUM mg/dL 2 3          Results from last 7 days   Lab Units 01/07/22  1444   HEMOGLOBIN A1C % 5 3                  Lipid Profile:   Lab Results   Component Value Date    CHOL 96 03/17/2015    CHOL 149 09/14/2013     Lab Results   Component Value Date    HDL 31 (L) 12/27/2021    HDL 28 (L) 01/30/2016    HDL 31 03/17/2015     Lab Results   Component Value Date    LDLCALC 63 12/27/2021    LDLCALC 52 01/30/2016    LDLCALC 41 03/17/2015     Lab Results   Component Value Date    TRIG 155 (H) 12/27/2021    TRIG 146 01/30/2016    TRIG 120 03/17/2015     Imaging: I have personally reviewed pertinent reports  and I have personally reviewed pertinent films in PACS  XR chest portable    Result Date: 1/14/2022  Narrative: CHEST INDICATION:   Post Open Heart surgery   COMPARISON:  1/13/2022; 12/24/2021 EXAM PERFORMED/VIEWS:  XR CHEST PORTABLE FINDINGS:  There has been interval extubation  The previously noted NG tube and Braman-Valerio catheter have also been removed  Right IJ catheter remains, tip of the mid SVC as does mediastinal drain and left basilar chest tube  Heart size top normal to mildly enlarged  Sternotomy wires and mediastinal clips are noted, compatible with prior CABG  The lungs are clear  No pneumothorax or pleural effusion  Osseous structures appear within normal limits for patient age  Impression: 1  Interval extubation and removal of the Braman-Valerio catheter with persistent mediastinal drain and left basilar chest tube in this patient had recent CABG  Workstation performed: [id number]     XR chest 2 views    Result Date: 12/24/2021  Narrative: CHEST INDICATION:   chest pain  COMPARISON:  Chest radiograph from 10/29/2018 and chest CT from 2/2/2010  EXAM PERFORMED/VIEWS:  XR CHEST PA & LATERAL FINDINGS: Cardiomediastinal silhouette appears unremarkable  No acute disease  No pneumothorax or pleural effusion  Benign left upper lobe nodule, stable since 2010  Osseous structures appear within normal limits for patient age  Impression: No acute cardiopulmonary disease  Workstation performed: [id number]     CT chest wo contrast    Result Date: 1/7/2022  Narrative: CT CHEST WITHOUT IV CONTRAST INDICATION:   I25 119: Atherosclerotic heart disease of native coronary artery with unspecified angina pectoris I21 4: Non-ST elevation (NSTEMI) myocardial infarction I10: Essential (primary) hypertension I25 2: Old myocardial infarction E78 2: Mixed hyperlipidemia Z01 810: Encounter for preprocedural cardiovascular examination Z01 818: Encounter for other preprocedural examination  COMPARISON:  02/02/2010 TECHNIQUE: CT examination of the chest was performed without intravenous contrast   Axial, sagittal, and coronal 2D reformatted images were created from the source data and submitted for interpretation  Radiation dose length product (DLP) for this visit:  197 mGy-cm   This examination, like all CT scans performed in the [facility], was performed utilizing techniques to minimize radiation dose exposure, including the use of iterative reconstruction and automated exposure control  FINDINGS: LUNGS:  Similar mild emphysema and tiny left lower lobe calcified granuloma  1 0 x 1 0 x 1 1 cm (length X depth X width) circumscribed, round, solid nodule with a central calcification in the left upper lobe (series previously 1 0 x 0 7 x 0 9 cm, measured today by this reader)  Patent airways  PLEURA:  Unremarkable  HEART/GREAT VESSELS: Normal heart size  Left ventricle apex myocardial fatty deposition, compatible with chronic myocardial infarction  No evidence of aneurysm  Atherosclerosis and probable left anterior distending coronary stent  Normal caliber aorta  No pericardial effusion  Distance of structures to the sternum: Brachiocephalic vein: 1 2 cm brachiocephalic artery: 1 3 cm Ascending aorta: 3 1 cm Right ventricle: 1 7 cm MEDIASTINUM AND JESUS:  Small, chronic calcified lymph nodes  Esophagus is within normal limits  CHEST WALL AND LOWER NECK:   Gynecomastia VISUALIZED STRUCTURES IN THE UPPER ABDOMEN:  Atherosclerosis  Otherwise within limits  OSSEOUS STRUCTURES:  No acute fracture or destructive osseous lesion  Impression: Preoperative measurements as above  Chronic, slightly increased, 1 1 cm solid left upper lobe pulmonary nodule, likely a benign granuloma, given morphology and other findings of prior granulomatous infection in the lungs  Other chronic and nonemergent findings above  Workstation performed: [id number]     Cardiac catheterization    Result Date: 12/28/2021  Narrative: · Left Anterior Descending: The vessel was visualized by angiography and is large  Prox LAD lesion is 95% stenosed  Culprit lesion  JIMENA flow is 3  The lesion is type C and diffuse   The lesion was previously treated using a stent  The lesion has restenosis  · Drug-eluting stent was successfully placed  The stent used was a STENT RESOLUTE SANDOVAL RX 3 X 38MM  Stent was deployed by way of balloon expansion  · After stent placement, there was now a ostial LAD lesion of 95 % with JIMENA 3 flow  No dissection plain was seen      ELADIA Anesthesia    Result Date: 1/13/2022  Narrative: Jose Eduardo Gonsalves MD     1/13/2022 11:09 AM Procedure Performed: ELADIA Anesthesia Start Time:  1/13/2022 8:02 AM Preanesthesia Checklist Patient identified, IV assessed, risks and benefits discussed, monitors and equipment assessed, procedure being performed at surgeon's request and anesthesia consent obtained  Procedure Diagnostic Indications for ELADIA:  assessment of surgical repair and hemodynamic monitoring  Type of ELADIA: complete ELADIA with interpretation  Images Saved: ultrasound permanent image saved  Physician Requesting Echo: Georgi Musa MD  Location performed: OR  Intubated  Bite block not placed  Heart visualized  Insertion of ELADIA Probe:  Atraumatic  Probe Type:  Multiplane  Modalities:  2D only, color flow mapping, continuous wave Doppler and pulse wave Doppler  Echocardiographic and Doppler Measurements PREPROCEDURE LEFT VENTRICLE: Systolic Function: normal  Ejection Fraction: 50%  Cavity size: normal  RIGHT VENTRICLE: Systolic Function: normal   Cavity size normal  No hypertrophy  AORTIC VALVE: Leaflets: normal and trileaflet  Leaflet motions normal and normal  Stenosis: none  Regurgitation: none  MITRAL VALVE: Leaflets: normal  Leaflet Motions: normal  Regurgitation: mild  Stenosis: none  TRICUSPID VALVE: Leaflets: normal  Leaflet Motions: normal  Stenosis: none  Regurgitation: mild  PULMONIC VALVE: Leaflets: normal  Regurgitation: none  Stenosis: none  ASCENDING AORTA: Size:  normal  Dissection not present  AORTIC ARCH: Size:  normal  dissection not present  Grade 3: atheroma protruding < 0 5 cm into lumen   DESCENDING AORTA: Size: normal  Dissection not present  Grade 3: atheroma protruding < 0 5 cm into lumen  RIGHT ATRIUM: Size:  normal  LEFT ATRIUM: Size: normal  LEFT ATRIAL APPENDAGE: Size: normal  ATRIAL SEPTUM: Intra-atrial septal morphology: normal  VENTRICULAR SEPTUM: Intra-ventricular septum morphology: normal  OTHER FINDINGS: Pericardium:  normal  Pleural Effusion:  none  POSTPROCEDURE LEFT VENTRICLE: Unchanged   RIGHT VENTRICLE: Unchanged   AORTIC VALVE: Unchanged   MITRAL VALVE: Unchanged   TRICUSPID VALVE: Unchanged   PULMONIC VALVE: Unchanged ATRIA: Unchanged   AORTA: Unchanged   REMOVAL: Probe Removal: atraumatic  VAS carotid complete study    Result Date: 12/27/2021  Narrative:  [facility] REPORT CLINICAL: Indications: Patient presents for a general health evaluation secondary to future open heart surgery  Patient is asymptomatic at this time  Risk Factors The patient has history of HTN, Hyperlipidemia, CAD and previous smoking (quit >10yrs ago)  FINDINGS:  Right        Impression  PSV  EDV (cm/s)  Direction of Flow  Ratio  Dist  ICA                 79          24                      0 98  Mid  ICA                  89          32                      1 10  Prox  ICA    1 - 49%      63          18                      0 78  Dist CCA                  73          22                            Mid CCA                   81          22                      0 89  Prox CCA                  91          18                            Ext Carotid               97           8                      1 20  Prox Vert                 65          16  Antegrade                 Subclavian               121           0                             Left         Impression  PSV  EDV (cm/s)  Direction of Flow  Ratio  Dist  ICA                 83          28                      0 96  Mid  ICA                  69          22                      0 80  Prox   ICA    1 - 49%      94          16                      1 10  Dist CCA 86          16                            Mid CCA                   86          11                      0 76  Prox CCA                 114          19                            Ext Carotid              117           0                      1 36  Prox Vert                 65          16  Antegrade                 Subclavian               128           0                               CONCLUSION:  Impression RIGHT: There is <50% stenosis noted in the internal carotid artery  Plaque is homogenous and smooth  Vertebral artery flow is antegrade  There is no significant subclavian artery disease  LEFT: There is <50% stenosis noted in the internal carotid artery  Plaque is homogenous and smooth  Vertebral artery flow is antegrade  There is no significant subclavian artery disease  Compared to previous study on 6/2/2009, there is no significant change  Internal carotid artery stenosis determination by consensus criteria from: Iza Clay et al  Carotid Artery Stenosis: Gray-Scale and Doppler US Diagnosis - Society of Radiologists in [address] [place] [address], Radiology 2003; [phone]  SIGNATURE: Electronically Signed by: Steve Griffin on 2021-12-27 08:16:52 PM    VAS lower limb vein mapping bypass graft    Result Date: 12/28/2021  Narrative:  [facility] REPORT CLINICAL: Indications: Patient presents for a general health evaluation secondary to future open heart surgery  Patient is asymptomatic at this time  Risk Factors The patient has history of CAD    FINDINGS:  Segment         Right        Left                            Diameter AP  Diameter AP  GSV Inguinal            5 3          5 4  GSV Prox Thigh          5 3          5 4  GSV Mid Thigh           3 8          4 7  GSV Dist Thigh          4 0          3 6  GSV Knee                4 2          3 3  GSV Prox Calf           2 8          4 7  GSV Mid Calf            2 9          3 4  GSV Dist Calf           2 6          2 9     CONCLUSION: Impression: RIGHT LOWER LIMB: The great saphenous vein is patent  from the groin to the ankle  The vein measures >2mm in caliber from the groin to the distal calf  Branches noted throughout the thigh  LEFT LOWER LIMB: The great saphenous vein is patent  from the groin to the ankle  The vein measures >2mm in caliber from the groin to the distal calf  SIGNATURE: Electronically Signed by: Shawna Garcia MD, [address] on 2021-12-28 08:25:04 PM    XR chest portable ICU    Result Date: 1/13/2022  Narrative: CHEST  PORTABLE INDICATION: Status post cardiac surgery  COMPARISON:  12/24/2021 VIEWS:   AP frontal; 1 image FINDINGS: Median sternotomy wires have been placed  Endotracheal tube is present, tip is 6 9 cm proximal to the dionne  [place] catheter is present, tip overlies the right pulmonary artery  Right jugular central venous catheter is noted, tip overlies the SVC  The cardiomediastinal silhouette is stable  Lungs are clear  No pneumothorax is identified  Osseous structures are age appropriate  Impression: Postoperative changes  Workstation performed: [id number]     Echo complete w/ contrast if indicated    Result Date: 12/27/2021  Narrative: Riky Carlisle  Left Ventricle: Left ventricular cavity size is normal  The left ventricular ejection fraction is 55% by visual estimation  Systolic function is low normal  There is severe hypokinesis of the mid anteroseptal and apical septal walls  There is apical akinesis  There is no concentric hypertrophy  Diastolic function is mildly abnormal, consistent with grade I (abnormal) relaxation    Aortic Valve: The aortic valve is trileaflet  The leaflets are mildly calcified  There increased calcifications involving the non coronary cusp  The leaflets exhibit normal mobility    Mitral Valve: The leaflets are not thickened  There is mild calcification  The leaflets exhibit normal mobility  There is mild annular calcification  There is mild regurgitation     Tricuspid Valve: Pulmonary artery systolic pressures cannot be estimated due to lack of tricuspid regurgitation jet  Thank you for allowing us to participate in this patient's care  This pt will follow up with Dr Ian Gonzalez once discharged  Counseling / Coordination of Care  Total floor / unit time spent today 45 minutes  Greater than 50% of total time was spent with the patient and / or family counseling and / or coordination of care  A description of the counseling / coordination of care: Review of history, current assessment, development of a plan  Code Status: Level 1 - Full Code    ** Please Note: Dragon 360 Dictation voice to text software may have been used in the creation of this document   **

## 2022-01-14 NOTE — PLAN OF CARE
Problem: Potential for Falls  Goal: Patient will remain free of falls  Description: INTERVENTIONS:  - Educate patient/family on patient safety including physical limitations  - Instruct patient to call for assistance with activity   - Consult OT/PT to assist with strengthening/mobility   - Keep Call bell within reach  - Keep bed low and locked with side rails adjusted as appropriate  - Keep care items and personal belongings within reach  - Initiate and maintain comfort rounds  - Make Fall Risk Sign visible to staff  - Offer Toileting every 2 Hours, in advance of need  - Initiate/Maintain 2alarm  - Obtain necessary fall risk management equipment: 2  - Apply yellow socks and bracelet for high fall risk patients  - Consider moving patient to room near nurses station  Outcome: Progressing     Problem: Prexisting or High Potential for Compromised Skin Integrity  Goal: Skin integrity is maintained or improved  Description: INTERVENTIONS:  - Identify patients at risk for skin breakdown  - Assess and monitor skin integrity  - Assess and monitor nutrition and hydration status  - Monitor labs   - Assess for incontinence   - Turn and reposition patient  - Assist with mobility/ambulation  - Relieve pressure over bony prominences  - Avoid friction and shearing  - Provide appropriate hygiene as needed including keeping skin clean and dry  - Evaluate need for skin moisturizer/barrier cream  - Collaborate with interdisciplinary team   - Patient/family teaching  - Consider wound care consult   Outcome: Progressing     Problem: MOBILITY - ADULT  Goal: Maintain or return to baseline ADL function  Description: INTERVENTIONS:  -  Assess patient's ability to carry out ADLs; assess patient's baseline for ADL function and identify physical deficits which impact ability to perform ADLs (bathing, care of mouth/teeth, toileting, grooming, dressing, etc )  - Assess/evaluate cause of self-care deficits   - Assess range of motion  - Assess patient's mobility; develop plan if impaired  - Assess patient's need for assistive devices and provide as appropriate  - Encourage maximum independence but intervene and supervise when necessary  - Involve family in performance of ADLs  - Assess for home care needs following discharge   - Consider OT consult to assist with ADL evaluation and planning for discharge  - Provide patient education as appropriate  Outcome: Progressing  Goal: Maintains/Returns to pre admission functional level  Description: INTERVENTIONS:  - Perform BMAT or MOVE assessment daily    - Set and communicate daily mobility goal to care team and patient/family/caregiver  - Collaborate with rehabilitation services on mobility goals if consulted  - Perform Range of Motion 2 times a day  - Reposition patient every 2 hours    - Dangle patient 2 times a day  - Stand patient 2 times a day  - Ambulate patient 2 times a day  - Out of bed to chair 2 times a day   - Out of bed for meals 2 times a day  - Out of bed for toileting  - Record patient progress and toleration of activity level   Outcome: Progressing

## 2022-01-14 NOTE — OCCUPATIONAL THERAPY NOTE
Occupational Therapy Evaluation     Patient Name: Julisa Owusu  ASEOQ'X Date: 1/14/2022  Problem List  Principal Problem:    Coronary artery disease involving native coronary artery of native heart with angina pectoris [facility])  Active Problems:    Primary hypertension    Anxiety with depression    Chest pain with high risk for cardiac etiology    Mixed hyperlipidemia    Chronic pain syndrome    NSTEMI (non-ST elevated myocardial infarction) ([facility] [address] )    S/P CABG x 1    Leukocytosis    Past Medical History  Past Medical History:   Diagnosis Date    [facility]     Anxiety     Arthritis     Back pain     CAD (coronary artery disease)     DDD (degenerative disc disease), lumbosacral     Depressed     Full dentures     GERD (gastroesophageal reflux disease)     Hyperlipidemia     Hypertension     [facility] )     2009    Wears glasses      Past Surgical History  Past Surgical History:   Procedure Laterality Date    ANGIOPLASTY  2009     LAD    CARDIAC CATHETERIZATION      CARDIAC CATHETERIZATION N/A 12/27/2021    Procedure: Cardiac catheterization;  Surgeon: Fawn Peraza MD;  Location: [facility]; Service: Cardiology    CARDIAC CATHETERIZATION N/A 12/27/2021    Procedure: Cardiac Coronary Angiogram;  Surgeon: Fawn Peraza MD;  [facility]: [facility]; Service: Cardiology    CARDIAC CATHETERIZATION N/A 12/27/2021    Procedure: Cardiac pci;  Surgeon: Fawn Peraza MD;  [facility]; Service: Cardiology    NASAL SINUS SURGERY      IN CABG, ARTERY-VEIN, SINGLE N/A 1/13/2022    Procedure: CORONARY ARTERY BYPASS GRAFT (CABG) x1- LIMA to LAD;  Surgeon: Josefa Albright MD;  [facility];  Service: Cardiac Surgery    IN ECHO TRANSESOPHAG  [address] N/A 1/13/2022    Procedure: TRANSESOPHAGEAL ECHOCARDIOGRAM ([place]);   Surgeon: Josefa Albright MD;  [facility];  Service: Cardiac Surgery    IN REMOVE ARMPIT LYMPH NODES SUPERFIC Right 11/21/2018    Procedure: EXCISION BIOPSY LYMPH NODE AXILLARY RIGHT;  Surgeon: Kavita Perkins MD;  Location: [facility];  Service: General             01/14/22 0908   OT Last Visit   OT Visit Date 01/14/22   Note Type   Note type Evaluation   Restrictions/Precautions   Other Precautions Cardiac/sternal;Multiple lines; Fall Risk;Pain;O2;Telemetry  (CT; HFNC)   Pain Assessment   Pain Assessment Tool FLACC   Pain Location/Orientation Orientation: Mid;Location: Chest   Hospital Pain Intervention(s) Repositioned; Ambulation/increased activity; Emotional support   Pain Rating: FLACC (Rest) - Face 1   Pain Rating: FLACC (Rest) - Legs 0   Pain Rating: FLACC (Rest) - Activity 0   Pain Rating: FLACC (Rest) - Cry 0   Pain Rating: FLACC (Rest) - Consolability 1   Score: FLACC (Rest) 2   Pain Rating: FLACC (Activity) - Face 1   Pain Rating: FLACC (Activity) - Legs 0   Pain Rating: FLACC (Activity) - Activity 0   Pain Rating: FLACC (Activity) - Cry 1   Pain Rating: FLACC (Activity) - Consolability 1   Score: FLACC (Activity) 3   Home Living   Type of Home House   Home Layout Two level; Able to live on main level with bedroom/bathroom   Bathroom Shower/Tub Tub/shower unit   Bathroom Toilet Standard   Bathroom Equipment   (denies)   [address]   (denies)   Additional Comments Pt reports living in a 2 story home with 3 FAY      Prior Function   Level of [place] Independent with ADLs and functional mobility   Lives With [facility] Help From Family   ADL Assistance Independent   IADLs Independent   Falls in the last 6 months 0   Vocational Retired   Lifestyle   Autonomy Pt reports being I with ADLS, IADLS and mobility without device PTA  (+)    Reciprocal Relationships Pt lives with family who are able to assist as needed upon d/c    Service to Others Retired   Intrinsic Gratification Active PTA   ADL   Where Assessed Chair   Eating Assistance 7  Independent   Grooming Assistance 5  Supervision/Setup   UB Bathing Assistance 4  Minimal Assistance   LB Bathing Assistance 3  Moderate Assistance   UB Dressing Assistance 4  Minimal Assistance   LB Dressing Assistance 3  Moderate Assistance   Toileting Assistance  3  Moderate Assistance   Bed Mobility   Additional Comments Unable to assess, pt seated OOB in chair upon OT arrival  After OT session pt in chair with all needs within reach  Transfers   Sit to Stand 3  Moderate assistance   Additional items Assist x 1; Increased time required;Verbal cues   Stand to Sit 3  Moderate assistance   Additional items Assist x 1; Increased time required;Verbal cues   Functional Mobility   Functional Mobility 3  Moderate assistance   Additional Comments Pt demonstrated short mobility with RW  Limited by pain and SOB this time  Additional items Rolling walker   Balance   Static Sitting Fair -   Dynamic Sitting Poor +   Static Standing Poor +   Dynamic Standing Poor   Ambulatory Poor   Activity Tolerance   Activity Tolerance Patient limited by fatigue;Patient limited by pain   Medical Staff Made Aware Seen with PT 2* medical complexity/ instability   Nurse Made Aware RN confirmed okay to see pt   RUE Assessment   RUE Assessment WFL   LUE Assessment   LUE Assessment WFL   Cognition   Overall Cognitive Status WFL   Arousal/Participation Alert; Cooperative   Attention Within functional limits   Orientation Level Oriented X4   Memory Decreased recall of precautions   Following Commands Follows one step commands without difficulty   Comments Pt presents with flat affect but is overall cooperative  Pt participated in cardiac packet this session  Assessment   Limitation Decreased ADL status; Decreased endurance;Decreased high-level ADLs; Decreased self-care trans   Prognosis Good   Assessment Pt is a 60 y o  male admitted to [facility] on 1/13/2022 w/ CAD s/p CABG x1 on 1/13/2022   Pt  has a past medical history of Angina pectoris ([facility] [address] ), Anxiety, Arthritis, Back pain, CAD (coronary artery disease), DDD (degenerative disc disease), lumbosacral, Depressed, Full dentures, GERD (gastroesophageal reflux disease), Hyperlipidemia, Hypertension, MI (myocardial infarction) ([facility] ), and Wears glasses  Pt with active OT orders and ambulate  orders  Pt resides in a 2 story home with 3 FAY  Pt lives with family who are able to assist as needed upon d/c  Pt was I w/  ADLS and IADLS, (+) drove, & required no use of DME PTA  Currently pt is mod A for functional transfers and functional mobility, min A for UB ADLS and mod A for LB ADLS  Pt is limited at this time 2*: pain, endurance, activity tolerance, functional mobility, forward functional reach, functional standing tolerance and decreased I w/ ADLS/IADLS  The following Occupational Performance Areas to address include: grooming, bathing/shower, toilet hygiene, dressing, functional mobility and clothing management  Based on the aforementioned OT evaluation, functional performance deficits, and assessments, pt has been identified as a high complexity evaluation  From OT standpoint, anticipate d/c home with family support  Pt to continue to benefit from acute immediate OT services to address the following goals 3-5x/week to  w/in 10-14 days: Kobi Ortez Goals   Patient Goals To feel better   LTG Time Frame 10-14   Long Term Goal #1 See goals below    Plan   Treatment Interventions ADL retraining;UE strengthening/ROM; Endurance training;Patient/family training;Equipment evaluation/education; Compensatory technique education;Continued evaluation;Cardiac education; Activityengagement; Energy conservation   Goal Expiration Date 22   OT Frequency 3-5x/wk   Recommendation   OT Discharge Recommendation No rehabilitation needs  (Home with increased social support)   OT - OK to Discharge Yes  (When medically appropriate)   AM-PAC Daily Activity Inpatient   Lower Body Dressing 2   Bathing 2   Toileting 2   Upper Body Dressing 3   Grooming 4   Eating 4   Daily Activity Raw Score 17   Daily Activity Standardized Score (Calc for Raw Score >=11) 37 26   AM-PAC Applied Cognition Inpatient   Following a Speech/Presentation 4   Understanding Ordinary Conversation 4   Taking Medications 4   Remembering Where Things Are Placed or Put Away 4   Remembering List of 4-5 Errands 4   Taking Care of Complicated Tasks 4   Applied Cognition Raw Score 24   Applied Cognition Standardized Score 62 21   Modified [place] Scale   Modified [place] Scale 4       GOALS    1) Pt will increase activity tolerance to G for 30 min txment sessions    2) Pt will complete UB/LB dressing/self care w/ mod I using adaptive device and DME as needed    3) Pt will complete bathing w/ Mod I w/ use of AE and DME as needed    4) Pt will complete toileting w/ mod I w/ G hygiene/thoroughness using DME as needed    5) Pt will improve functional transfers to Mod I on/off all surfaces using DME as needed w/ G balance/safety     6) Pt will improve functional mobility during ADL/IADL/leisure tasks to Mod I using DME as needed w/ G balance/safety     7) Pt will demonstrate G carryover of pt/caregiver education and training as appropriate w/ mod I     8) Pt will engage in cardiac education using the Recovering After Cardiac Surgery packet w/ G participation and G carryover  9) Pt will demonstrate 100% carryover of precautions s/p review w/ mod I w/ G tolerance/participation t/o functional ADL/IADL/leisure tasks      10) Pt will independently identify and utilize 2-3 coping strategies to increase positive affect and promote overall well-being      11) Pt will engage in ongoing cognitive assessment w/ G participation to assist w/ safe d/c planning/recommendations (as needed)    LATISHA Pisano, OTR/L

## 2022-01-14 NOTE — PROGRESS NOTES
Progress Note - Cardiothoracic Surgery   [facility] 60 y o  male MRN: [id number]  Unit/Bed#: [facility] 414-01 Encounter: [id number]    Coronary artery disease  S/P coronary artery bypass grafting x 1; POD # 1    24 Hour Events: Overnight, pt maxed out on Cardene 15mg  Given 1 time doses of Hydralazine and Labetalol  Currently on Cardene 4mg and Insulin infusion  Complains of significant incisional chest pain  Delined      Medications:   Scheduled Meds:  Current Facility-Administered Medications   Medication Dose Route Frequency Provider Last Rate    acetaminophen  650 mg Rectal Q4H PRN Damian Garcia PA-C      acetaminophen  975 mg Oral [address]      amiodarone  200 mg Oral [facility] Sol Valle      aspirin  325 mg Oral Daily Sol Valle      atorvastatin  80 mg Oral Daily With EVARISTO Teixeira      bisacodyl  10 mg Rectal Daily PRN Damian Garcia PA-C      calcium chloride  1 g Intravenous Once Damian Garcia PA-C      cefazolin  2,000 mg Intravenous Q8H Damian Garcia PA-C Stopped (01/14/22 0200)    chlorhexidine  15 mL Swish & Spit BID Damian Garcia PA-C      clonazePAM  0 5 mg Oral BID PRN Damian Garcia PA-C      ezetimibe  10 mg Oral Daily Damian Garcia PA-C      fentanyl citrate (PF)  50 mcg Intravenous Q1H PRN Damian Garcia PA-C      fondaparinux  2 5 mg Subcutaneous Daily Shabana Rivers PA-C      furosemide  40 mg Intravenous Q6H PRN Damian Garcia PA-C      hydrALAZINE  5 mg Intravenous Q6H PRN JOHNNIE Rosa      HYDROmorphone  0 5 mg Intravenous Q2H PRN Damian Garcia PA-C      insulin regular (HumuLIN R,NovoLIN R) infusion  0 3-21 Units/hr Intravenous Titrated Damian Garcia PA-C 1 5 Units/hr (01/13/22 1402)    Labetalol HCl  10 mg Intravenous Q6H PRN JOHNNIE Rosa      lactated ringers  500 mL Intravenous Q30 Min PRN Damian Garcia PA-C Stopped (01/13/22 1500)    lidocaine (cardiac)  100 mg Intravenous Q30 Min PRN Damian Garcia PA-C      mirtazapine  45 mg Oral HS [facility], PA-C      mupirocin  1 application Nasal H97H [address] [place], [place]      niCARdipine  2 5-15 mg/hr Intravenous Titrated ConstancJOHNNIE Lee 4 mg/hr (01/14/22 0606)    ondansetron  4 mg Intravenous Q6H PRN [facility] Umanzor, PA-C      oxyCODONE  10 mg Oral Q4H PRN Mabeline Timo, PA-C      oxyCODONE  5 mg Oral Q4H PRN Mabeline Timo, PA-C      pantoprazole  40 mg Oral Early Morning [facility], PA-C      polyethylene glycol  17 g Oral Daily [place], [place]      potassium chloride  20 mEq Intravenous Once PRN [facility] Umanzor, PA-CHE      potassium chloride  20 mEq Intravenous Q1H PRN Rocky Umanzor, PA-C 20 mEq (01/13/22 1713)    potassium chloride  20 mEq Intravenous Q30 Min PRN Rocky Umanzor, PA-CHE      QUEtiapine  100 mg Oral HS [facility], PA-CHE      sodium chloride  20 mL/hr Intravenous Continuous [facility], PA-C 20 mL/hr (01/13/22 1200)    vilazodone  40 mg Oral Daily With Breakfast Shabana Rivers PA-C       Continuous Infusions:insulin regular (HumuLIN R,NovoLIN R) infusion, 0 3-21 Units/hr, Last Rate: 1 5 Units/hr (01/13/22 1402)  niCARdipine, 2 5-15 mg/hr, Last Rate: 4 mg/hr (01/14/22 0606)  sodium chloride, 20 mL/hr, Last Rate: 20 mL/hr (01/13/22 1200)      PRN Meds:   acetaminophen    bisacodyl    clonazePAM    fentanyl citrate (PF)    furosemide    hydrALAZINE    HYDROmorphone    Labetalol HCl    lactated ringers    lidocaine (cardiac)    ondansetron    oxyCODONE    oxyCODONE    potassium chloride    potassium chloride    potassium chloride    Vitals:   Vitals:    01/14/22 0415 01/14/22 0500 01/14/22 0536 01/14/22 0600   BP:       Pulse: 89 90  81   Resp: 17 19  17   Temp:    99 °F (37 2 °C)   TempSrc:       SpO2: 96% 96%  97%   Weight:   72 9 kg (160 lb 11 5 oz)    Height:           Hemodynamics:  PAP: (24-33)/(10-20) 26/13  CO:  [3 5 L/min-6 8 L/min] 6 6 L/min  CI:  [1 8 L/min/m2-3 6 L/min/m2] 3 5 L/min/m2    Respiratory:   SpO2: SpO2: 97 %; 6 LPM    Intake/Output:   I/O       01/12 0701 01/13 0700 01/13 0701  01/14 0700 01/14 0701  01/15 0700    P  O   0     I V  (mL/kg)  3977 5 (54 6)     IV Piggyback  650     Cell Saver  675     Total Intake(mL/kg)  5302 5 (72 7)     Urine (mL/kg/hr)  3810 (2 2)     Blood  675     Chest Tube  540     Total Output  5025     Net  +277 5                  Chest tube Output:    Mediastinal tubes: 90 mL/8 hours  290 mL/24 hours   Pleural tubes: 80 mL/8 hours  250 mL/24 hours     Weights:   Weight (last 2 days)     Date/Time Weight    01/14/22 0536 72 9 (160 72)    01/13/22 0547 70 8 (156)        Admit Weight: 70 8 kg    Results:   Results from last 7 days   Lab Units 01/14/22 0357 01/13/22  1945 01/13/22  1152 01/13/22  1022 01/13/22  1021   WBC Thousand/uL 15 44*  --   --   --   --    HEMOGLOBIN g/dL 14 6 15 0  --    < >  --    I STAT HEMOGLOBIN g/dl  --   --  11 9*   < >  --    HEMATOCRIT % 42 3 42 8  --    < >  --    HEMATOCRIT, ISTAT %  --   --  35*   < >  --    PLATELETS Thousands/uL 182  --   --   --  210    < > = values in this interval not displayed  Results from last 7 days   Lab Units 01/14/22  0357 01/13/22  1945 01/13/22  1604 01/13/22  1152 01/13/22  1147 01/13/22  1147 01/13/22  1115   SODIUM mmol/L 138  --   --   --   --  142  --    POTASSIUM mmol/L 3 9 4 5 3 9  --    < > 4 4  --    CHLORIDE mmol/L 106  --   --   --   --  114*  --    CO2 mmol/L 26  --   --   --   --  27  --    CO2, I-STAT mmol/L  --   --   --  27  --   --   --    BUN mg/dL 8  --   --   --   --  11  --    CREATININE mg/dL 0 55*  --   --   --   --  0 68  --    GLUCOSE, ISTAT mg/dl  --   --   --  157*  --   --    < >   CALCIUM mg/dL 8 1*  --   --   --   --  8 7  --     < > = values in this interval not displayed  Studies:  CXR: final report pending  Mild vascular congestion, basilar atelectasis  Lg gastric air bubble  Lines and tubes in good position  EKG: NSR, rate 92    I have personally reviewed pertinent reports     and I have personally reviewed pertinent films in PACS    Invasive Lines/Tubes:  Invasive Devices  Report    Central Venous Catheter Line            CVC Central Lines 01/13/22 Triple <1 day          Peripheral Intravenous Line            Peripheral IV 01/13/22 Left Antecubital <1 day          Arterial Line            Arterial Line 01/13/22 Left Radial <1 day          Line            Pacer Wires <1 day    Pacer Wires <1 day          Drain            Chest Tube 1 Left Pleural 32 Fr  <1 day    Chest Tube 2 Posterior Mediastinal 32 Fr  <1 day    Chest Tube 3 Anterior Mediastinal 32 Fr  <1 day    Urethral Catheter Non-latex;Straight-tip; Temperature probe 16 Fr  <1 day                Physical Exam:    HEENT/NECK:  Normocephalic  Atraumatic  No jugular venous distention  Cardiac: Regular rate and rhythm and No murmurs/rubs/gallops  Pulmonary:  Breath sounds diminished in the bases bilaterally  and Poor inspiratory effort  Abdomen:  Non-tender, Non-distended and Normal bowel sounds  Incisions: Sternum is stable  Incision dressed with Acticoat  No erythema or drainage  Extremities: Extremities warm/dry and No edema B/L  Neuro: Alert and oriented X 3, Sensation is grossly intact and No focal deficits  Skin: Warm/Dry, without rashes or lesions  Assessment:  Principal Problem:    Coronary artery disease involving native coronary artery of native heart with angina pectoris (HCC)  Active Problems:    Primary hypertension    Old myocardial infarct    Anxiety with depression    Chest pain with high risk for cardiac etiology    Mixed hyperlipidemia    Episodic cluster headache, not intractable    Chronic pain syndrome    NSTEMI (non-ST elevated myocardial infarction) (HCC)    S/P CABG x 1       Coronary artery disease  S/P coronary artery bypass grafting x 1; POD # 1    Plan:    1   Cardiac:   Cardiac infusions: Cardene, 4 mg/hour  Cardiac index > 2 2   D/C Arterial line  NSR; HR/BP well-controlled  Start Lopressor, 25mg PO BID  Continue ASA, Zetia and Statin therapy  Maintain epicardial pacing wires for additional day  Maintain central IV access today for access  Continue DVT prophylaxis  Consult cardiology for postoperative medical management    2  Pulmonary:   Extubated  CXR findings: mild vascular congestion, atelectasis, lg gastric air bubble  Acute post-op pulmonary insufficiency; Requiring 6 liters via nasal cannula, secondary to atelectasis, pulmonary vascular congestion and poor inspiratory effort secondary to pain  Continue incentive spirometry/coughing/deep breathing exercises  Wean supplemental oxygen as tolerated for saturation > 90%  Chest tube output remains persistently high; Continue chest tubes to suction today    3  Renal:   Intake/Output net: +277 mL/24 hours  Post op volume overload:    Start Lasix, 20 mg IV BID  Start Potassium supplementation, 20 mEq BID  Post op Creatinine stable; Follow up labs prn  Discontinue potassium replacement scales  Discontinue key catheter    4  Neuro:  Neurologically intact; No active issues  Incisional pain well-controlled  Continue Tylenol, 975 mg PO q 8, standing dose  Continue Oxycodone, 2 5 to 5 mg PO q 4 hours prn pain    5  GI:  Tolerating clear liquid diet, without evidence of dysphagia; Initiate TLC 2 3 gm sodium diet with consistent carbohydrate modifier  Maintain 1800 mL daily fluid restriction   Continue stool softeners and prn suppository  Continue GI prophylaxis    6  Endo:   Glucose well-controlled  Discontinue continuous insulin infusion and add Insulin sliding scale coverage    7  Hematology:   Post-operative blood count acceptable; Trend prn and Leukocytosis    8   Disposition:  Transfer from ICU to telemetry today    VTE Pharmacologic Prophylaxis: Fondaparinux (Arixtra)  VTE Mechanical Prophylaxis: sequential compression device    Collaborative rounds completed with supervising physician and with the bedside nurse    SIGNATURE: Tena Drake PA-C  DATE: January 14, 2022  TIME: 7:05 AM

## 2022-01-14 NOTE — PLAN OF CARE
Problem: PHYSICAL THERAPY ADULT  Goal: Performs mobility at highest level of function for planned discharge setting  See evaluation for individualized goals  Description: Treatment/Interventions: Functional transfer training,LE strengthening/ROM,Elevations,Therapeutic exercise,Endurance training,Equipment eval/education,Bed mobility,Gait training,Spoke to nursing,OT  Equipment Recommended: Pablo Vincent (at this time)       See flowsheet documentation for full assessment, interventions and recommendations  Note: Prognosis: Good  Problem List: Decreased strength,Decreased endurance,Impaired balance,Decreased mobility,Pain  Assessment: Pt is 60 y o  male admitted with Dx of Isolated ostial LAD coronary artery disease as a complication of PCI and underwent Coronary artery bypass grafting x 1 with left internal mammary artery to left anterior descending on 1/13/2022  Pt 's comorbidities affecting POC include: Angina pectoris ([facility] [address] ), Anxiety, Arthritis, Back pain, CAD (coronary artery disease), DDD (degenerative disc disease), lumbosacral, Depressed, Hypertension, and MI (myocardial infarction) ([facility] [address] ) and personal factors of: FAY w/o hand rail and steps in the house  Pt's clinical presentation is currently unstable/unpredictable which is evident in ongoing telemetry monitoring while in a critical care unit, suppl O2 needs via HFNC, CT in place and inability to progress further w/ mobilization at this time  Pt presents w/ postop pain and guarding, generalized weakness, incl decreased LE strength, decreased functional endurance and activity tolerance, impaired balance w/ associated gait deviations requiring use of rw at this time and fall risk  Will cont to follow pt in PT for progressive mobilization to address above functional deficits and to max level of (I), endurance, and safety   Otherwise, anticipate pt will return home w/ available family support upon D/C provided he cont improving w/ mobility skills, safety, and endurance (incl on the steps as needed) and when medically cleared; home PT follow up is recommended at this time; will follow  PT Discharge Recommendation: Home with home health rehabilitation (home PT)          See flowsheet documentation for full assessment

## 2022-01-14 NOTE — UTILIZATION REVIEW
Inpatient Admission Authorization Request   NOTIFICATION OF INPATIENT ADMISSION/INPATIENT AUTHORIZATION REQUEST   SERVICING FACILITY:   [facility]  Address: [address] [facility], [address] [place] [id number]  Tax ID: [id number]  NPI: [id number]  Place of Service: Inpatient [address] [facility] Code: 24     ATTENDING PROVIDER:  Attending Name and NPI#: Alejandra Espinal Md [[id number]]  Address: [address] [place] [address], [address] [place] [id number]  Phone: [phone]     UTILIZATION REVIEW CONTACT:  Patricia Montalvo, Utilization   Network Utilization Review Department  Phone: [phone]  Fax: [phone]  Email: [email or web address]     PHYSICIAN ADVISORY SERVICES:  FOR FEJU-WH-SGCK REVIEW - MEDICAL NECESSITY DENIAL  Phone: [phone]  Fax: [phone]  Email: [email or web address]     TYPE OF REQUEST:  Inpatient Status     ADMISSION INFORMATION:  ADMISSION DATE/TIME: 1/13/22  6:40 AM  PATIENT DIAGNOSIS CODE/DESCRIPTION:  Coronary artery disease involving native coronary artery of native heart with angina pectoris ([facility] [address] ) [I25 119]  NSTEMI (non-ST elevated myocardial infarction) ([facility] [address] ) [I21 4]  Primary hypertension [I10]  Old myocardial infarct [I25 2]  Mixed hyperlipidemia [E78 2]  Preop cardiovascular exam [Z01 810]  Preop testing [Z01 818]  Preoperative clearance [Z01 818]  Ventricular tachycardia ([facility] [address] ) [I47 2]  DISCHARGE DATE/TIME: No discharge date for patient encounter  DISCHARGE DISPOSITION (IF DISCHARGED): Left against medical advice or discontinued care     IMPORTANT INFORMATION:  Please contact the Patricia Montalvo directly with any questions or concerns regarding this request  Department voicemails are confidential     Send requests for admission clinical reviews, concurrent reviews, approvals, and administrative denials due to lack of clinical to fax [phone]

## 2022-01-14 NOTE — PHYSICAL THERAPY NOTE
Physical Therapy Evaluation     Patient's Name: Jessica Us    Admitting Diagnosis  Coronary artery disease involving native coronary artery of native heart with angina pectoris [facility] ) [I25 119]  NSTEMI (non-ST elevated myocardial infarction) ([facility] ) [I21 4]  Primary hypertension [I10]  Old myocardial infarct [I25 2]  Mixed hyperlipidemia [E78 2]  Preop cardiovascular exam [Z01 810]  Preop testing [Z01 818]  Preoperative clearance [Z01 818]  Ventricular tachycardia (HCC) [I47 2]    Problem List  Patient Active Problem List   Diagnosis    Primary hypertension    Old myocardial infarct    Anxiety with depression    Coronary artery disease involving native coronary artery of native heart with angina pectoris ([facility] )    Anxiety    Chest pain with high risk for cardiac etiology    Mixed hyperlipidemia    Adenopathy    Episodic cluster headache, not intractable    Chronic pain syndrome    NSTEMI (non-ST elevated myocardial infarction) ([facility]    Ventricular tachycardia ([facility] )    S/P CABG x 1    Leukocytosis       Past Medical History  Past Medical History:   Diagnosis Date    Angina pectoris ([facility] )     Anxiety     Arthritis     Back pain     CAD (coronary artery disease)     DDD (degenerative disc disease), lumbosacral     Depressed     Full dentures     GERD (gastroesophageal reflux disease)     Hyperlipidemia     Hypertension     MI (myocardial infarction) ([facility] )     2009    Wears glasses        Past Surgical History  Past Surgical History:   Procedure Laterality Date    ANGIOPLASTY  2009     LAD    CARDIAC CATHETERIZATION      CARDIAC CATHETERIZATION N/A 12/27/2021    Procedure: Cardiac catheterization;  Surgeon: Rocael Godinez MD;  [facility]: [facility]; Service: Cardiology    CARDIAC CATHETERIZATION N/A 12/27/2021    Procedure: Cardiac Coronary Angiogram;  Surgeon: Rocael Godinez MD;  [facility];   Service: Cardiology    CARDIAC CATHETERIZATION N/A 12/27/2021 Procedure: Cardiac pci;  Surgeon: Joseph Mcnamara MD;  [facility]; Service: Cardiology    NASAL SINUS SURGERY      AK CABG, ARTERY-VEIN, SINGLE N/A 1/13/2022    Procedure: CORONARY ARTERY BYPASS GRAFT (CABG) x1- LIMA to LAD;  Surgeon: Jona Butler MD;  Location: [facility];  Service: Cardiac Surgery    AK ECHO TRANSESOPHAG [address] N/A 1/13/2022    Procedure: TRANSESOPHAGEAL ECHOCARDIOGRAM (ELADIA); Surgeon: Jona Butler MD;  Location: [facility];  Service: Cardiac Surgery    AK REMOVE ARMPIT LYMPH NODES [address] Right 11/21/2018    Procedure: EXCISION BIOPSY LYMPH NODE AXILLARY RIGHT;  Surgeon: Lily Harris MD;  Location: [facility];  Service: General            01/14/22 0914   PT Last Visit   PT Visit Date 01/14/22   Note Type   Note type Evaluation   Pain Assessment   Pain Assessment Tool FLACC   Pain Location/Orientation Orientation: Mid;Location: Chest;Location: Incision   Pain Onset/Description Onset: Ongoing;Frequency: Intermittent; Descriptor: Aching;Descriptor: Discomfort   Effect of Pain on Daily Activities guarding   Patient's Stated Pain Goal No pain   Hospital Pain Intervention(s) Repositioned; Ambulation/increased activity; Emotional support   Pain Rating: FLACC (Rest) - Face 1   Pain Rating: FLACC (Rest) - Legs 0   Pain Rating: FLACC (Rest) - Activity 0   Pain Rating: FLACC (Rest) - Cry 1   Pain Rating: FLACC (Rest) - Consolability 0   Score: FLACC (Rest) 2   Pain Rating: FLACC (Activity) - Face 1   Pain Rating: FLACC (Activity) - Legs 0   Pain Rating: FLACC (Activity) - Activity 0   Pain Rating: FLACC (Activity) - Cry 1   Pain Rating: FLACC (Activity) - Consolability 1   Score: FLACC (Activity) 3   Restrictions/Precautions   Braces or Orthoses   (denies)   Other Precautions Cardiac/sternal;Multiple lines;Telemetry;O2;Fall Risk;Pain  (CT; HFNC)   Home Living   Type of Home House   Home Layout Two level; Able to live on main level with bedroom/bathroom  (3 FAY w/o hand rail or no FAY)   Prior Function   Level of [place] Independent with ADLs and functional mobility  (amb w/o AD)   Lives With Family   General   Additional Pertinent History cleared for assessment (spoke to nstika)   Cognition   Overall Cognitive Status WFL   Arousal/Participation Alert   Orientation Level Oriented to person;Oriented to place;Oriented to situation   Memory Decreased recall of precautions   Following Commands Follows one step commands without difficulty   Subjective   Subjective Alert; in the chair; agreeable to mobilize   RUE Assessment   RUE Assessment WFL  (AROM)   LUE Assessment   LUE Assessment WFL  (AROM)   RLE Assessment   RLE Assessment WFL  (AROM)   Strength RLE   RLE Overall Strength   (fair + /good- (grossly))   LLE Assessment   LLE Assessment WFL  (AROM)   Strength LLE   LLE Overall Strength   (fair +/good - (grossly))   Transfers   Sit to Stand 3  Moderate assistance   Additional items Assist x 1; Increased time required;Verbal cues   Stand to Sit 3  Moderate assistance   Additional items Assist x 1; Increased time required;Verbal cues   Ambulation/Elevation   Gait pattern Excessively slow; Step to;Short stride; Inconsistent marjan   Gait Assistance 3  Moderate assist   Additional items Assist x 1;Verbal cues; Tactile cues  (stand by (A) of 2 more staff for lines and chair follow)   Assistive Device Rolling walker   Distance 10 ft; pt self limited further amb requesting to rest; chair ride back to room   Balance   Static Sitting Fair -   Dynamic Sitting Poor +   Static Standing Poor +   Dynamic Standing Poor   Ambulatory Poor   Activity Tolerance   Activity Tolerance Patient limited by fatigue;Patient limited by pain; Other (Comment)  (SOB (O2 sat stable))   Medical Staff Made Aware Co-eval performed w/ OTR due to complexity of medical status and multiple comorbidities   Nurse Made Aware spoke to [facility]   Assessment   Prognosis Good   Problem List Decreased strength;Decreased endurance; Impaired balance;Decreased mobility;Pain   Assessment Pt is 60 y o  male admitted with Dx of Isolated ostial LAD coronary artery disease as a complication of PCI and underwent Coronary artery bypass grafting x 1 with left internal mammary artery to left anterior descending on 1/13/2022  Pt 's comorbidities affecting POC include: Angina pectoris ([facility] [address] ), Anxiety, Arthritis, Back pain, CAD (coronary artery disease), DDD (degenerative disc disease), lumbosacral, Depressed, Hypertension, and MI (myocardial infarction) ([facility] [address] ) and personal factors of: FAY w/o hand rail and steps in the house  Pt's clinical presentation is currently unstable/unpredictable which is evident in ongoing telemetry monitoring while in a critical care unit, suppl O2 needs via HFNC, CT in place and inability to progress further w/ mobilization at this time  Pt presents w/ postop pain and guarding, generalized weakness, incl decreased LE strength, decreased functional endurance and activity tolerance, impaired balance w/ associated gait deviations requiring use of rw at this time and fall risk  Will cont to follow pt in PT for progressive mobilization to address above functional deficits and to max level of (I), endurance, and safety  Otherwise, anticipate pt will return home w/ available family support upon D/C provided he cont improving w/ mobility skills, safety, and endurance (incl on the steps as needed) and when medically cleared; home PT follow up is recommended at this time; will follow  Goals   Patient Goals to go home   STG Expiration Date 01/24/22   Short Term Goal #1 7-10 days  Pt will amb 150 ft w/ rw <--> SPC, mod (I) in order to facilitate safe return to premorbid environment and to initiate return to community amb status  Pt will negotiate 3 steps w/ SPC PRN, (S)x1 in order to assure safe navigation in and out of the premorbid living environment   Pt will negotiate 12 steps w/ hand rail and SPC PRN, mod (I) in order to navigate between levels of home environment safely  Pt will achieve (I) level w/ bed mob in order to facilitate safety with OOB and back to bed transitions in own living environment  Pt will perform transfers w/ mod (I) to assure (I) and safety w/ functional mobility/transitions w/ all aspects of mobility/locomotion  Pt will participate in LE therex and balance activities to max progression w/ mobility skills  PT Treatment Day 0   Plan   Treatment/Interventions Functional transfer training;LE strengthening/ROM; Elevations; Therapeutic exercise; Endurance training;Equipment eval/education; Bed mobility;Gait training;Spoke to nursing;OT   PT Frequency 4-6x/wk   Recommendation   PT Discharge Recommendation Home with home health rehabilitation  (home PT)   Equipment Recommended Walker  (at this time)   Cassie 74 walker   Anibal Piper 435   Turning in Bed Without Bedrails 2   Lying on Back to Sitting on Edge of Flat Bed 2   Moving Bed to Chair 2   Standing Up From Chair 2   Walk in Room 2   Climb 3-5 Stairs 2   Basic Mobility Inpatient Raw Score 12   Basic Mobility Standardized Score 32 23   Highest Level Of Mobility   JH-HLM Goal 4: Move to chair/commode   JH-HLM Highest Level of Mobility 6: Walk 10 steps or more   JH-HLM Goal Achieved Yes   Modified [place] Scale   Modified Pat Scale 4   End of Consult   Patient Position at End of Consult Bedside chair; All needs within reach  (reclined)       Ami Amezcua, PT

## 2022-01-15 LAB
ANION GAP SERPL CALCULATED.3IONS-SCNC: 4 MMOL/L (ref 4–13)
ATRIAL RATE: 88 BPM
BUN SERPL-MCNC: 18 MG/DL (ref 5–25)
CALCIUM SERPL-MCNC: 8.6 MG/DL (ref 8.3–10.1)
CHLORIDE SERPL-SCNC: 104 MMOL/L (ref 100–108)
CO2 SERPL-SCNC: 29 MMOL/L (ref 21–32)
CREAT SERPL-MCNC: 0.76 MG/DL (ref 0.6–1.3)
ERYTHROCYTE [DISTWIDTH] IN BLOOD BY AUTOMATED COUNT: 12.5 % (ref 11.6–15.1)
GFR SERPL CREATININE-BSD FRML MDRD: 100 ML/MIN/1.73SQ M
GLUCOSE SERPL-MCNC: 102 MG/DL (ref 65–140)
GLUCOSE SERPL-MCNC: 107 MG/DL (ref 65–140)
GLUCOSE SERPL-MCNC: 119 MG/DL (ref 65–140)
GLUCOSE SERPL-MCNC: 123 MG/DL (ref 65–140)
GLUCOSE SERPL-MCNC: 143 MG/DL (ref 65–140)
HCT VFR BLD AUTO: 36.2 % (ref 36.5–49.3)
HGB BLD-MCNC: 12.4 G/DL (ref 12–17)
MCH RBC QN AUTO: 30.8 PG (ref 26.8–34.3)
MCHC RBC AUTO-ENTMCNC: 34.3 G/DL (ref 31.4–37.4)
MCV RBC AUTO: 90 FL (ref 82–98)
P AXIS: 89 DEGREES
PLATELET # BLD AUTO: 165 THOUSANDS/UL (ref 149–390)
PMV BLD AUTO: 10.7 FL (ref 8.9–12.7)
POTASSIUM SERPL-SCNC: 3.6 MMOL/L (ref 3.5–5.3)
PR INTERVAL: 160 MS
QRS AXIS: 2 DEGREES
QRSD INTERVAL: 80 MS
QT INTERVAL: 376 MS
QTC INTERVAL: 454 MS
RBC # BLD AUTO: 4.02 MILLION/UL (ref 3.88–5.62)
SODIUM SERPL-SCNC: 137 MMOL/L (ref 136–145)
T WAVE AXIS: 102 DEGREES
VENTRICULAR RATE: 88 BPM
WBC # BLD AUTO: 10.98 THOUSAND/UL (ref 4.31–10.16)

## 2022-01-15 PROCEDURE — 94760 N-INVAS EAR/PLS OXIMETRY 1: CPT

## 2022-01-15 PROCEDURE — 93010 ELECTROCARDIOGRAM REPORT: CPT | Performed by: INTERNAL MEDICINE

## 2022-01-15 PROCEDURE — 99232 SBSQ HOSP IP/OBS MODERATE 35: CPT | Performed by: INTERNAL MEDICINE

## 2022-01-15 PROCEDURE — 99024 POSTOP FOLLOW-UP VISIT: CPT | Performed by: THORACIC SURGERY (CARDIOTHORACIC VASCULAR SURGERY)

## 2022-01-15 PROCEDURE — 85027 COMPLETE CBC AUTOMATED: CPT | Performed by: PHYSICIAN ASSISTANT

## 2022-01-15 PROCEDURE — 99024 POSTOP FOLLOW-UP VISIT: CPT | Performed by: PHYSICIAN ASSISTANT

## 2022-01-15 PROCEDURE — 93005 ELECTROCARDIOGRAM TRACING: CPT

## 2022-01-15 PROCEDURE — 80048 BASIC METABOLIC PNL TOTAL CA: CPT | Performed by: PHYSICIAN ASSISTANT

## 2022-01-15 PROCEDURE — 82948 REAGENT STRIP/BLOOD GLUCOSE: CPT

## 2022-01-15 RX ORDER — FUROSEMIDE 10 MG/ML
40 INJECTION INTRAMUSCULAR; INTRAVENOUS 2 TIMES DAILY
Status: DISCONTINUED | OUTPATIENT
Start: 2022-01-15 | End: 2022-01-15

## 2022-01-15 RX ORDER — FUROSEMIDE 10 MG/ML
40 INJECTION INTRAMUSCULAR; INTRAVENOUS
Status: DISCONTINUED | OUTPATIENT
Start: 2022-01-15 | End: 2022-01-16 | Stop reason: HOSPADM

## 2022-01-15 RX ORDER — POTASSIUM CHLORIDE 20 MEQ/1
40 TABLET, EXTENDED RELEASE ORAL
Status: DISCONTINUED | OUTPATIENT
Start: 2022-01-15 | End: 2022-01-16 | Stop reason: HOSPADM

## 2022-01-15 RX ORDER — POTASSIUM CHLORIDE 20 MEQ/1
40 TABLET, EXTENDED RELEASE ORAL 2 TIMES DAILY
Status: DISCONTINUED | OUTPATIENT
Start: 2022-01-15 | End: 2022-01-15

## 2022-01-15 RX ADMIN — CLOPIDOGREL BISULFATE 75 MG: 75 TABLET ORAL at 09:15

## 2022-01-15 RX ADMIN — DOCUSATE SODIUM 100 MG: 100 CAPSULE ORAL at 09:15

## 2022-01-15 RX ADMIN — POTASSIUM CHLORIDE 40 MEQ: 1500 TABLET, EXTENDED RELEASE ORAL at 17:46

## 2022-01-15 RX ADMIN — AMIODARONE HYDROCHLORIDE 200 MG: 200 TABLET ORAL at 14:18

## 2022-01-15 RX ADMIN — ATORVASTATIN CALCIUM 80 MG: 80 TABLET, FILM COATED ORAL at 17:46

## 2022-01-15 RX ADMIN — FUROSEMIDE 40 MG: 10 INJECTION, SOLUTION INTRAMUSCULAR; INTRAVENOUS at 17:46

## 2022-01-15 RX ADMIN — POTASSIUM CHLORIDE 40 MEQ: 1500 TABLET, EXTENDED RELEASE ORAL at 09:17

## 2022-01-15 RX ADMIN — POTASSIUM CHLORIDE 40 MEQ: 1500 TABLET, EXTENDED RELEASE ORAL at 12:57

## 2022-01-15 RX ADMIN — KETOROLAC TROMETHAMINE 15 MG: 30 INJECTION, SOLUTION INTRAMUSCULAR at 03:15

## 2022-01-15 RX ADMIN — ACETAMINOPHEN 975 MG: 325 TABLET, FILM COATED ORAL at 14:17

## 2022-01-15 RX ADMIN — CLONAZEPAM 0.5 MG: 0.5 TABLET ORAL at 21:54

## 2022-01-15 RX ADMIN — CHLORHEXIDINE GLUCONATE 15 ML: 1.2 SOLUTION ORAL at 17:46

## 2022-01-15 RX ADMIN — DOCUSATE SODIUM 100 MG: 100 CAPSULE ORAL at 17:46

## 2022-01-15 RX ADMIN — CHLORHEXIDINE GLUCONATE 15 ML: 1.2 SOLUTION ORAL at 09:16

## 2022-01-15 RX ADMIN — ASPIRIN 81 MG CHEWABLE TABLET 81 MG: 81 TABLET CHEWABLE at 09:15

## 2022-01-15 RX ADMIN — POLYETHYLENE GLYCOL 3350 17 G: 17 POWDER, FOR SOLUTION ORAL at 09:16

## 2022-01-15 RX ADMIN — AMIODARONE HYDROCHLORIDE 200 MG: 200 TABLET ORAL at 05:23

## 2022-01-15 RX ADMIN — ACETAMINOPHEN 975 MG: 325 TABLET, FILM COATED ORAL at 05:22

## 2022-01-15 RX ADMIN — OXYCODONE HYDROCHLORIDE 5 MG: 5 TABLET ORAL at 14:17

## 2022-01-15 RX ADMIN — EZETIMIBE 10 MG: 10 TABLET ORAL at 09:15

## 2022-01-15 RX ADMIN — AMIODARONE HYDROCHLORIDE 200 MG: 200 TABLET ORAL at 21:51

## 2022-01-15 RX ADMIN — MIRTAZAPINE 45 MG: 30 TABLET, FILM COATED ORAL at 21:51

## 2022-01-15 RX ADMIN — METOPROLOL TARTRATE 25 MG: 25 TABLET, FILM COATED ORAL at 20:02

## 2022-01-15 RX ADMIN — METOPROLOL TARTRATE 25 MG: 25 TABLET, FILM COATED ORAL at 09:15

## 2022-01-15 RX ADMIN — FONDAPARINUX SODIUM 2.5 MG: 2.5 INJECTION, SOLUTION SUBCUTANEOUS at 09:16

## 2022-01-15 RX ADMIN — FUROSEMIDE 40 MG: 10 INJECTION, SOLUTION INTRAMUSCULAR; INTRAVENOUS at 09:18

## 2022-01-15 RX ADMIN — PANTOPRAZOLE SODIUM 40 MG: 40 TABLET, DELAYED RELEASE ORAL at 05:23

## 2022-01-15 NOTE — PROGRESS NOTES
Progress Note - Cardiothoracic Surgery   [facility] 60 y o  male MRN: [id number]  Unit/Bed#: [facility] 429-01 Encounter: [id number]    Coronary artery disease  S/P coronary artery bypass grafting; POD # 2      24 Hour Events: Transferred out of ICU  No events  No complaints  Tolerating diet  (+) flatus/BM  Ambulating well, home at discharge  Pain controlled      Medications:   Scheduled Meds:  Current Facility-Administered Medications   Medication Dose Route Frequency Provider Last Rate    acetaminophen  650 mg Rectal Q4H PRN Melody Tyler PA-C      acetaminophen  975 mg Oral Q8H Melody Tyler PA-C      amiodarone  200 mg Oral Q8H [address] Melody Tyler PA-C      aspirin  81 mg Oral Daily Melody Tyler PA-C      atorvastatin  80 mg Oral Daily With ViaEVARISTO anderson      bisacodyl  10 mg Rectal Daily PRN Melody Tyler PA-C      chlorhexidine  15 mL Swish & Spit BID Melody Tyler PA-C      clonazePAM  0 5 mg Oral BID PRN Melody Tyler PA-C      clopidogrel  75 mg Oral Daily Melody Tyler PA-C      docusate sodium  100 mg Oral BID Melody Tyler PA-C      ezetimibe  10 mg Oral Daily Melody Tyler PA-C      fondaparinux  2 5 mg Subcutaneous Daily Melody Tyler PA-C      furosemide  20 mg Intravenous BID Melody Tyler PA-C      insulin lispro  1-5 Units Subcutaneous HS Melody Tyler PA-C      insulin lispro  1-5 Units Subcutaneous TID AC Melody Tyler PA-C      lidocaine (cardiac)  100 mg Intravenous Q30 Min PRN Melody Tyler PA-C      metoprolol tartrate  25 mg Oral Q12H [address] Melody Tyler PA-C      mirtazapine  45 mg Oral HS Melody Tyler PA-C      mupirocin  1 application Nasal N93P [address] Melody Tyler PA-C      ondansetron  4 mg Intravenous Q6H PRN Melody Tyler PA-C      oxyCODONE  10 mg Oral Q4H PRN Melody Tyler PA-C      oxyCODONE  5 mg Oral Q4H PRN Melody Tyler PA-C      pantoprazole  40 mg Oral Early Morning Melody Tyler PA-C      polyethylene glycol  17 g Oral Daily Melody Tyler PA-C      potassium chloride  20 mEq Oral BID Melody Tyler PA-C      QUEtiapine  100 mg Oral HS Melody Tyler PA-C      temazepam  15 mg Oral HS PRN Melody Tyler PA-C      vilazodone  40 mg Oral Daily With Breakfast Melody Tyler PA-C       Continuous Infusions:   PRN Meds:   acetaminophen    bisacodyl    clonazePAM    lidocaine (cardiac)    ondansetron    oxyCODONE    oxyCODONE    temazepam    Vitals:   Vitals:    01/14/22 2254 01/15/22 0252 01/15/22 0500 01/15/22 0512   BP: 135/78 138/81     BP Location:       Pulse: 96 91 88    Resp: 17 14     Temp: 99 3 °F (37 4 °C) 98 6 °F (37 °C)     TempSrc:       SpO2: 91% 91% 92%    Weight:    72 kg (158 lb 11 7 oz)   Height:         Bp x24hrs: 100-140/50-80    Telemetry: NSR; Heart Rate: 89; no events/24hrs    Respiratory:   SpO2: SpO2: 92 %, SpO2 Activity: SpO2 Activity: At Rest, SpO2 Device: O2 Device: None (Room air); Room Air    Intake/Output:     Intake/Output Summary (Last 24 hours) at 1/15/2022 0641  Last data filed at 1/15/2022 0630  Gross per 24 hour   Intake 128 85 ml   Output 920 ml   Net -791 15 ml      UOP - 200cc/8hrs; 650cc/24hrs    Chest tube Output:    Mediastinal tubes: 30 mL/8 hours  170 mL/24 hours   Pleural tubes: 50 mL/8 hours  100 mL/24 hours     Weights:   Weight (last 2 days)       Date/Time Weight    01/15/22 0512 72 (158 73)    01/14/22 0536 72 9 (160 72)    01/13/22 0547 70 8 (156)          Admission weight: 70 8kg - up 1 2kg from admission weight    Results:   Results from last 7 days   Lab Units 01/15/22  0504 01/14/22  0357 01/13/22  1945 01/13/22  1022 01/13/22  1021   WBC Thousand/uL 10 98* 15 44*  --   --   --    HEMOGLOBIN g/dL 12 4 14 6 15 0   < >  --    I STAT HEMOGLOBIN   --   --   --    < >  --    HEMATOCRIT % 36 2* 42 3 42 8   < >  --    HEMATOCRIT, ISTAT   --   --   --    < >  --    PLATELETS Thousands/uL 165 182  --   --  210    < > = values in this interval not displayed       Results from last 7 days Lab Units 01/15/22  0504 01/14/22  0357 01/13/22  1945 01/13/22  1604 01/13/22  1152 01/13/22  1147 01/13/22  1115   SODIUM mmol/L 137 138  --   --   --  142  --    POTASSIUM mmol/L 3 6 3 9 4 5   < >  --  4 4  --    CHLORIDE mmol/L 104 106  --   --   --  114*  --    CO2 mmol/L 29 26  --   --   --  27  --    CO2, I-STAT mmol/L  --   --   --   --  27  --    < >   BUN mg/dL 18 8  --   --   --  11  --    CREATININE mg/dL 0 76 0 55*  --   --   --  0 68  --    GLUCOSE, ISTAT mg/dl  --   --   --   --  157*  --    < >   CALCIUM mg/dL 8 6 8 1*  --   --   --  8 7  --     < > = values in this interval not displayed  Point of care glucose: 107 - 132 - 124 - 164 - 96 - 135 - 131    Studies:  No new studies    I have personally reviewed pertinent reports  and I have personally reviewed pertinent films in PACS    Invasive Lines/Tubes:  Invasive Devices  Report      Central Venous Catheter Line              CVC Central Lines 01/13/22 Triple Right Internal jugular 1 day              Peripheral Intravenous Line              Peripheral IV 01/13/22 Left Antecubital 1 day              Line              Pacer Wires 1 day    Pacer Wires 1 day              Drain              Chest Tube 1 Left Pleural 32 Fr  1 day    Chest Tube 2 Posterior Mediastinal 32 Fr  1 day    Chest Tube 3 Anterior Mediastinal 32 Fr  1 day                    Physical Exam:    HEENT/NECK:  Normocephalic  Atraumatic  No jugular venous distention  Cardiac: Regular rate and rhythm and No murmurs/rubs/gallops  Pulmonary:  Crackles bilaterally and good inspiratory effort  Abdomen:  Non-tender, Non-distended, and Hypo-active bowel sounds  Incisions: Sternum is stable  Incision dressed with Acticoat  No erythema or drainage and Saphenectomy incision dressed with Acticoat    No erythema or drainage  Extremities: Extremities warm/dry and 1+ non-pitting edema B/L  Neuro: Alert and oriented X 3  Skin: Warm/Dry, without rashes or lesions  Assessment:  Principal Problem:    Coronary artery disease involving native coronary artery of native heart with angina pectoris (HCC)  Active Problems:    Primary hypertension    Anxiety with depression    Chest pain with high risk for cardiac etiology    Mixed hyperlipidemia    Chronic pain syndrome    NSTEMI (non-ST elevated myocardial infarction) (HCC)    S/P CABG x 1    Leukocytosis       Coronary artery disease  S/P coronary artery bypass grafting; POD # 2    Plan:    Cardiac:   NSR; HR/BP well-controlled  Continue Lopressor, 25mg PO BID  Continue ASA and Statin therapy  Continue Plavix  Continue Zetia  Epicardial pacing wires no longer required  Remove today  Maintain central IV access today for blood draws  Continue DVT prophylaxis    Pulmonary:   Good Room air oxygen saturation; Continue incentive spirometry/Coughing/Deep breathing exercises  Chest tube drainage diminished; D/C today    Renal:   Intake/Output net: (-)791 2 mL/24 hours  Diuretic Regimen:  Continue Lasix 20 mg IV BID -- increase to 40mg BID  Continue Potassium Chloride 20 mEq PO BID -- increase to 40 BID  Post op Creatinine stable; Follow up labs prn    Neuro:  Neurologically intact; No active issues  Incisional pain well-controlled  Continue Tylenol, 975 mg PO q 8, standing dose  Continue Oxycodone, 2 5 to 5 mg PO q 4 hours prn pain   Continue Remeron   Continue Seroquel   Continue Viibryd    GI:  Tolerating TLC 2 3 gm sodium diet  Maintain 1800 mL daily fluid restriction   Continue stool softeners and prn suppository  Continue GI prophylaxis    Endo:   Glucose well-controlled with sliding scale coverage    7    Hematology:    Leukocytosis, afebrile, likely reactive   K 3 6, change today    8     Disposition:      Ambulating independently, Anticipate discharge to home once medically stblae (next 24-48 hours pending UOP)     VTE Pharmacologic Prophylaxis: Fondaparinux (Arixtra)  VTE Mechanical Prophylaxis: sequential compression device    Collaborative rounds completed with supervising physician  Plan of care discussed with bedside nurse    SIGNATURE: Kavita Campos PA-C  DATE: January 15, 2022  TIME: 6:41 AM

## 2022-01-15 NOTE — PROCEDURES
01/15/22    Procedure: Chest tube removal    Patient had out >500cc urine since this AM & Lasix increased to TID w/o significant CT o/p therefore ok for removal per surgeon  Chest tubes removed in routine fashion without incident  Insertion site dressed with Acticoat  Edmundoshashanktheodore Grabieltheodore tolerated the procedure well  Nurse notified  Bahman Amaya PA-C  DATE: January 15, 2022  TIME: 10:34 AM  01/15/22    Procedure: Epicardial Pacing Wire removal    Corry Calix was returned to bed and informed of mandatory one hour post-procedure bed rest   The assigned nurse was notified  Epicardial pacing wires removed in routine fashion, without incident -- aware A wire was partially sewn in but no issues w/ resistance w/ removing  The patient tolerated the procedure well  Vital signs ordered  q 15 minutes for one hour, as per protocol      Bahman Amaya PA-C  DATE: January 15, 2022  TIME: 10:34 AM

## 2022-01-15 NOTE — PROGRESS NOTES
Cardiology Progress Note Gage Cooper 60 y o  male MRN: [id number]    Unit/Bed#: [facility] 429-01 Encounter: [id number]    Assessment and plan  1  Coronary artery disease status post PCI of prox LAD December 2021, now status post LIMA to LAD  2  Ischemic cardiomyopathy ejection fraction improved up to 50%  3  Hypertension  4  Hyperlipidemia    Recommendations:  Patient needs to remain on dual anti-platelet therapy due to recent drug-eluting stent in the proximal LAD  Continue statin beta-blocker  No significant events on telemetry  Continue diuresis  Hopeful discharge in next 24-48 hours will follow-up in the [facility] office  Patient is on Seroquel on amiodarone would check ECGs for QT interval       Subjective:    No significant events overnight  Denies any chest pain or shortness of breath  Still has chest tubes in  No significant events on telemetry  ROS    Objective:   Vitals: Blood pressure 130/72, pulse 88, temperature 98 6 °F (37 °C), resp  rate 15, height 5' 10" (1 778 m), weight 72 kg (158 lb 11 7 oz), SpO2 94 %  , Body mass index is 22 78 kg/m² ,   Orthostatic Blood Pressures      Most Recent Value   Blood Pressure 130/72 filed at 01/15/2022 0740   Patient Position - Orthostatic VS Lying filed at 01/14/2022 8270         Systolic (38MUP), KPX:694 , Min:107 , ZEI:630     Diastolic (73WPW), :50, Min:64, Max:81      Intake/Output Summary (Last 24 hours) at 1/15/2022 0912  Last data filed at 1/15/2022 0630  Gross per 24 hour   Intake 170 ml   Output 785 ml   Net -615 ml     Weight (last 2 days)     Date/Time Weight    01/15/22 0512 72 (158 73)    01/14/22 0536 72 9 (160 72)    01/13/22 0547 70 8 (156)            Telemetry Review: No significant arrhythmias seen on telemetry review  EKG personally reviewed by Jorge Luis Golden, DO  Physical Exam  Vitals and nursing note reviewed  Constitutional:       General: He is not in acute distress  Appearance: He is well-developed     HENT: Head: Normocephalic and atraumatic  Eyes:      Conjunctiva/sclera: Conjunctivae normal       Pupils: Pupils are equal, round, and reactive to light  Cardiovascular:      Rate and Rhythm: Normal rate and regular rhythm  Heart sounds: Normal heart sounds  No murmur heard  No friction rub  Pulmonary:      Effort: Pulmonary effort is normal  No respiratory distress  Breath sounds: Normal breath sounds  No wheezing or rales  Abdominal:      General: Bowel sounds are normal  There is no distension  Palpations: Abdomen is soft  Tenderness: There is no abdominal tenderness  There is no rebound  Musculoskeletal:         General: No tenderness or deformity  Normal range of motion  Cervical back: Neck supple  Skin:     General: Skin is warm and dry  Findings: No erythema  Neurological:      Mental Status: He is alert and oriented to person, place, and time  Cranial Nerves: No cranial nerve deficit  Laboratory Results:        CBC with diff:   Results from last 7 days   Lab Units 01/15/22  0504 01/14/22  0357 01/13/22  1945 01/13/22  1152 01/13/22  1147 01/13/22  1115 01/13/22  1022 01/13/22  1021 01/13/22  1011   WBC Thousand/uL 10 98* 15 44*  --   --   --   --   --   --   --    HEMOGLOBIN g/dL 12 4 14 6 15 0  --  13 9  --   --   --   --    I STAT HEMOGLOBIN g/dl  --   --   --  11 9*  --  11 2* 8 8*  --    < >   HEMATOCRIT % 36 2* 42 3 42 8  --  40 7  --   --   --   --    HEMATOCRIT, ISTAT %  --   --   --  35*  --  33* 26*  --    < >   MCV fL 90 90  --   --   --   --   --   --   --    PLATELETS Thousands/uL 165 182  --   --   --   --   --  210  --    MCH pg 30 8 31 0  --   --   --   --   --   --   --    MCHC g/dL 34 3 34 5  --   --   --   --   --   --   --    RDW % 12 5 12 3  --   --   --   --   --   --   --    MPV fL 10 7 10 9  --   --   --   --   --  11 3  --     < > = values in this interval not displayed           CMP:  Results from last 7 days   Lab Units 01/15/22  0504 01/14/22  0357 01/13/22  1945 01/13/22  1604 01/13/22  1152 01/13/22  1147 01/13/22  1115 01/13/22  1022 01/13/22  1011 01/13/22  0939 01/13/22  0939 01/13/22  0756 01/13/22  0756   POTASSIUM mmol/L 3 6 3 9 4 5 3 9  --  4 4  --   --   --   --   --   --   --    CHLORIDE mmol/L 104 106  --   --   --  114*  --   --   --   --   --   --   --    CO2 mmol/L 29 26  --   --   --  27  --   --   --   --   --   --   --    CO2, I-STAT mmol/L  --   --   --   --  27  --  28 30 31   < > 29   < > 29   BUN mg/dL 18 8  --   --   --  11  --   --   --   --   --   --   --    CREATININE mg/dL 0 76 0 55*  --   --   --  0 68  --   --   --   --   --   --   --    GLUCOSE, ISTAT mg/dl  --   --   --   --  157*  --  160* 138 129  --  148*  --  110   CALCIUM mg/dL 8 6 8 1*  --   --   --  8 7  --   --   --   --   --   --   --    EGFR ml/min/1 73sq m 100 114  --   --   --  105  --   --   --   --   --   --   --     < > = values in this interval not displayed  BMP:  Results from last 7 days   Lab Units 01/15/22  0504 01/14/22  0357 01/13/22  1945 01/13/22  1604 01/13/22  1152 01/13/22  1147 01/13/22  1115 01/13/22  1115 01/13/22  1022 01/13/22  1022 01/13/22  1011 01/13/22  1011   POTASSIUM mmol/L 3 6 3 9 4 5 3 9  --  4 4  --   --   --   --   --   --    CHLORIDE mmol/L 104 106  --   --   --  114*  --   --   --   --   --   --    CO2 mmol/L 29 26  --   --   --  27  --   --   --   --   --   --    CO2, I-STAT mmol/L  --   --   --   --  27  --   --  28  --  30  --  31   BUN mg/dL 18 8  --   --   --  11  --   --   --   --   --   --    CREATININE mg/dL 0 76 0 55*  --   --   --  0 68  --   --   --   --   --   --    GLUCOSE, ISTAT mg/dl  --   --   --   --  157*  --    < > 160*   < > 138   < > 129   CALCIUM mg/dL 8 6 8 1*  --   --   --  8 7  --   --   --   --   --   --     < > = values in this interval not displayed  BNP: No results for input(s): BNP in the last 72 hours      Magnesium:   Results from last 7 days   Lab Units 01/14/22  0357   MAGNESIUM mg/dL 2 3       Coags:       TSH:        Hemoglobin A1C       Lipid Profile:       Cardiac testing:   No results found for this or any previous visit  No results found for this or any previous visit  No results found for this or any previous visit  No results found for this or any previous visit  Meds/Allergies   all current active meds have been reviewed  Medications Prior to Admission   Medication    acetaminophen (TYLENOL) 500 mg tablet    aspirin 81 MG tablet    atorvastatin (LIPITOR) 40 mg tablet    clonazePAM (KlonoPIN) 1 mg tablet    clopidogrel (PLAVIX) 75 mg tablet    ezetimibe (ZETIA) 10 mg tablet    isosorbide mononitrate (IMDUR) 30 mg 24 hr tablet    lisinopril (ZESTRIL) 40 mg tablet    mirtazapine (REMERON) 45 MG tablet    mupirocin (BACTROBAN) 2 % nasal ointment    omeprazole (PriLOSEC) 20 mg delayed release capsule    QUEtiapine (SEROquel) 100 mg tablet    verapamil (CALAN) 80 mg tablet    VIIBRYD 40 MG tablet    nitroglycerin (NITROSTAT) 0 4 mg SL tablet          Assessment:  Principal Problem:    Coronary artery disease involving native coronary artery of native heart with angina pectoris ([facility])  Active Problems:    Primary hypertension    Anxiety with depression    Chest pain with high risk for cardiac etiology    Mixed hyperlipidemia    Chronic pain syndrome    NSTEMI (non-ST elevated myocardial infarction) ([facility])    S/P CABG x 1    Leukocytosis            Counseling / Coordination of Care  Total floor / unit time spent today 25 minutes  Greater than 50% of total time was spent with the patient and / or family counseling and / or coordination of care  A description of the counseling / coordination of care: Sonja Cuba

## 2022-01-16 LAB
ANION GAP SERPL CALCULATED.3IONS-SCNC: 5 MMOL/L (ref 4–13)
BUN SERPL-MCNC: 16 MG/DL (ref 5–25)
CALCIUM SERPL-MCNC: 8.7 MG/DL (ref 8.3–10.1)
CHLORIDE SERPL-SCNC: 108 MMOL/L (ref 100–108)
CO2 SERPL-SCNC: 25 MMOL/L (ref 21–32)
CREAT SERPL-MCNC: 0.66 MG/DL (ref 0.6–1.3)
ERYTHROCYTE [DISTWIDTH] IN BLOOD BY AUTOMATED COUNT: 12.3 % (ref 11.6–15.1)
GFR SERPL CREATININE-BSD FRML MDRD: 106 ML/MIN/1.73SQ M
GLUCOSE SERPL-MCNC: 105 MG/DL (ref 65–140)
GLUCOSE SERPL-MCNC: 110 MG/DL (ref 65–140)
GLUCOSE SERPL-MCNC: 148 MG/DL (ref 65–140)
HCT VFR BLD AUTO: 36.5 % (ref 36.5–49.3)
HGB BLD-MCNC: 12.2 G/DL (ref 12–17)
MCH RBC QN AUTO: 31 PG (ref 26.8–34.3)
MCHC RBC AUTO-ENTMCNC: 33.4 G/DL (ref 31.4–37.4)
MCV RBC AUTO: 93 FL (ref 82–98)
PLATELET # BLD AUTO: 170 THOUSANDS/UL (ref 149–390)
PMV BLD AUTO: 10.7 FL (ref 8.9–12.7)
POTASSIUM SERPL-SCNC: 4.2 MMOL/L (ref 3.5–5.3)
RBC # BLD AUTO: 3.93 MILLION/UL (ref 3.88–5.62)
SODIUM SERPL-SCNC: 138 MMOL/L (ref 136–145)
WBC # BLD AUTO: 10.29 THOUSAND/UL (ref 4.31–10.16)

## 2022-01-16 PROCEDURE — 99024 POSTOP FOLLOW-UP VISIT: CPT | Performed by: PHYSICIAN ASSISTANT

## 2022-01-16 PROCEDURE — 93005 ELECTROCARDIOGRAM TRACING: CPT

## 2022-01-16 PROCEDURE — 85027 COMPLETE CBC AUTOMATED: CPT | Performed by: PHYSICIAN ASSISTANT

## 2022-01-16 PROCEDURE — 80048 BASIC METABOLIC PNL TOTAL CA: CPT | Performed by: PHYSICIAN ASSISTANT

## 2022-01-16 PROCEDURE — 82948 REAGENT STRIP/BLOOD GLUCOSE: CPT

## 2022-01-16 RX ORDER — METOPROLOL TARTRATE 50 MG/1
50 TABLET, FILM COATED ORAL EVERY 12 HOURS SCHEDULED
Status: DISCONTINUED | OUTPATIENT
Start: 2022-01-16 | End: 2022-01-16 | Stop reason: HOSPADM

## 2022-01-16 RX ORDER — POLYETHYLENE GLYCOL 3350 17 G/17G
17 POWDER, FOR SOLUTION ORAL DAILY
Qty: 30 EACH | Refills: 0 | Status: SHIPPED | OUTPATIENT
Start: 2022-01-16 | End: 2022-07-27

## 2022-01-16 RX ORDER — POTASSIUM CHLORIDE 20 MEQ/1
20 TABLET, EXTENDED RELEASE ORAL DAILY
Qty: 10 TABLET | Refills: 1 | Status: SHIPPED | OUTPATIENT
Start: 2022-01-16 | End: 2022-07-27

## 2022-01-16 RX ORDER — HYDRALAZINE HYDROCHLORIDE 20 MG/ML
10 INJECTION INTRAMUSCULAR; INTRAVENOUS EVERY 6 HOURS PRN
Status: DISCONTINUED | OUTPATIENT
Start: 2022-01-16 | End: 2022-01-16 | Stop reason: HOSPADM

## 2022-01-16 RX ORDER — DOCUSATE SODIUM 100 MG/1
100 CAPSULE, LIQUID FILLED ORAL 2 TIMES DAILY
Qty: 60 CAPSULE | Refills: 0 | Status: SHIPPED | OUTPATIENT
Start: 2022-01-16 | End: 2022-07-19

## 2022-01-16 RX ORDER — OXYCODONE HYDROCHLORIDE 5 MG/1
TABLET ORAL
Qty: 30 TABLET | Refills: 0 | Status: SHIPPED | OUTPATIENT
Start: 2022-01-16 | End: 2022-07-27

## 2022-01-16 RX ORDER — ATORVASTATIN CALCIUM 80 MG/1
80 TABLET, FILM COATED ORAL DAILY
Qty: 30 TABLET | Refills: 2
Start: 2022-01-16 | End: 2022-02-28 | Stop reason: SDUPTHER

## 2022-01-16 RX ORDER — METOPROLOL TARTRATE 50 MG/1
50 TABLET, FILM COATED ORAL EVERY 12 HOURS SCHEDULED
Qty: 60 TABLET | Refills: 2 | Status: SHIPPED | OUTPATIENT
Start: 2022-01-16 | End: 2022-01-17

## 2022-01-16 RX ORDER — TORSEMIDE 20 MG/1
20 TABLET ORAL DAILY
Qty: 10 TABLET | Refills: 1 | Status: SHIPPED | OUTPATIENT
Start: 2022-01-16 | End: 2022-07-27

## 2022-01-16 RX ORDER — LISINOPRIL 10 MG/1
10 TABLET ORAL DAILY
Status: DISCONTINUED | OUTPATIENT
Start: 2022-01-16 | End: 2022-01-16

## 2022-01-16 RX ORDER — VERAPAMIL HYDROCHLORIDE 80 MG/1
80 TABLET ORAL EVERY 8 HOURS SCHEDULED
Status: DISCONTINUED | OUTPATIENT
Start: 2022-01-16 | End: 2022-01-16 | Stop reason: HOSPADM

## 2022-01-16 RX ADMIN — METOPROLOL TARTRATE 50 MG: 50 TABLET, FILM COATED ORAL at 10:27

## 2022-01-16 RX ADMIN — CLOPIDOGREL BISULFATE 75 MG: 75 TABLET ORAL at 10:27

## 2022-01-16 RX ADMIN — PANTOPRAZOLE SODIUM 40 MG: 40 TABLET, DELAYED RELEASE ORAL at 05:48

## 2022-01-16 RX ADMIN — EZETIMIBE 10 MG: 10 TABLET ORAL at 10:27

## 2022-01-16 RX ADMIN — HYDRALAZINE HYDROCHLORIDE 10 MG: 20 INJECTION, SOLUTION INTRAMUSCULAR; INTRAVENOUS at 02:51

## 2022-01-16 RX ADMIN — AMIODARONE HYDROCHLORIDE 200 MG: 200 TABLET ORAL at 05:47

## 2022-01-16 RX ADMIN — OXYCODONE HYDROCHLORIDE 5 MG: 5 TABLET ORAL at 11:29

## 2022-01-16 RX ADMIN — VILAZODONE HYDROCHLORIDE 40 MG: 40 TABLET ORAL at 10:28

## 2022-01-16 RX ADMIN — POTASSIUM CHLORIDE 40 MEQ: 1500 TABLET, EXTENDED RELEASE ORAL at 10:27

## 2022-01-16 RX ADMIN — VERAPAMIL HYDROCHLORIDE 80 MG: 80 TABLET ORAL at 10:28

## 2022-01-16 RX ADMIN — ASPIRIN 81 MG CHEWABLE TABLET 81 MG: 81 TABLET CHEWABLE at 10:27

## 2022-01-16 RX ADMIN — FUROSEMIDE 40 MG: 10 INJECTION, SOLUTION INTRAMUSCULAR; INTRAVENOUS at 05:48

## 2022-01-16 NOTE — DISCHARGE SUMMARY
Discharge Summary - Cardiothoracic Surgery   [facility] 60 y o  male MRN: [id number]  Unit/Bed#: [facility] 429-01 Encounter: [id number]    Admission Date: 1/13/2022     Discharge Date: 01/16/22    Admitting Diagnosis: Coronary artery disease involving native coronary artery of native heart with angina pectoris ([facility] [address] ) [I25 119]  NSTEMI (non-ST elevated myocardial infarction) ([facility] [address] ) [I21 4]  Primary hypertension [I10]  Old myocardial infarct [I25 2]  Mixed hyperlipidemia [E78 2]  Preop cardiovascular exam [Z01 810]  Preop testing [Z01 818]  Preoperative clearance [Z01 818]  Ventricular tachycardia ([facility] [address] ) [I47 2]    Primary Discharge Diagnosis:   Coronary artery disease  S/P coronary artery bypass grafting;    Secondary Discharge Diagnosis:   1  H/o NSTEMI 12/21 w/ unsuccessful PCI to LAD  2  HTN (no BB 2/2 bradycardia/depression)  3  HLD  4  VT  5  GERD  6  Chronic pain syndrome (gabapentin, PRN robaxin and tramadol)  7  DDD  8  Thoracic spondylosis s/p spinal nerve blocks  9  Cluster HA (verapamil)  10  Arthritis  11  Anxiety  12  Depression    Attending: BRIAN Galvan  Consulting Physician(s):   Cardiology  Medical/Critical Care  Occupational Therapy  Physical Therapy    Procedures Performed:   Procedure(s):  CORONARY ARTERY BYPASS GRAFT (CABG) x1- LIMA to LAD  TRANSESOPHAGEAL ECHOCARDIOGRAM (ELADIA)     Hospital Course: The patient was seen in consultation prior to this admission for evaluation of Coronary artery disease  Risks and benefits of coronary artery bypass grafting were discussed in detail, and patient was agreeable  Routine preoperative evaluation was completed and informed consent was obtained prior to admission  1/13: Elective admission for CABG x 1 (LIMA)  Transferred to ICU supported with no gtts  No significant postoperative bleeding  EKG SR  Extubated  On Cardene 2 5mg      1/14: Required max dose Cardene overnight  Given Labetalol and Hydralazine x 1   Currently on Cardene 4mg and Insulin gtt  6L NC  NSR  Delined  Labs stable  Significant pain  Oxy increased to 5/10mg, add Toradol 15mg PRN x 3  Start Metoprolol 25mg q12, wean Cardene off  Lasix 20mg IV BID  SSIC  ASA and Plavix per JRF  Transfer to telemetry  1/15: No events  Net (-) 791 15, increase Lasix to 40mg IV TID  Discontinue CTs & EPWs (no resistance w/ removal)  Home tomorrow  1/16:  No events  HTN, increase Lopressor to 50mg & stop Lisinopril & resume Verapamil (home med for cluster HAs)  Stable for discharge to home  Condition at Discharge:   good     Discharge Physical Exam:    Please see the documented physical exam from this morning's progress note for details  Discharge Data:  Results from last 7 days   Lab Units 01/16/22  0444 01/15/22  0504 01/14/22  0357   WBC Thousand/uL 10 29* 10 98* 15 44*   HEMOGLOBIN g/dL 12 2 12 4 14 6   HEMATOCRIT % 36 5 36 2* 42 3   PLATELETS Thousands/uL 170 165 182     Results from last 7 days   Lab Units 01/16/22  0444 01/15/22  0504 01/14/22  0357 01/13/22  1604 01/13/22  1152   POTASSIUM mmol/L 4 2 3 6 3 9   < >  --    CHLORIDE mmol/L 108 104 106   < >  --    CO2 mmol/L 25 29 26   < >  --    CO2, I-STAT mmol/L  --   --   --   --  27   BUN mg/dL 16 18 8   < >  --    CREATININE mg/dL 0 66 0 76 0 55*   < >  --    GLUCOSE, ISTAT mg/dl  --   --   --   --  157*   CALCIUM mg/dL 8 7 8 6 8 1*   < >  --     < > = values in this interval not displayed  Discharge instructions/Information to patient and family:   See after visit summary for information provided to patient and family  Ambrose Duggan was educated on restrictions regarding driving and lifting, and techniques of proper incisional care  They were specifically counselled on signs and symptoms of an incisional infection, and advised to contact our service immediately should they develop fevers, sweats, chill, redness or drainage at the site of any incisions      Provisions for Follow-Up Care:  See after visit summary for information related to follow-up care and any pertinent home health orders  Disposition:  Home w/ HHC, home PT & RW    Planned Readmission:   No    Discharge Medications:  See after visit summary for reconciled discharge medications provided to patient and family  Ha Frazier was provided contact information and scheduled a follow up appointment with BRIAN Juan  Additionally, follow up appointments have been scheduled for their primary care physician and primary cardiologist   Contact information was provided  Upon admission, troponins were Not checked    Corry Calix was counseled on the importance of avoiding tobacco products  As with all patients whom have undergone open heart surgery, tobacco cessation medication was contraindicated at the time of discharge  ACE/ARB was Prescribed at discharge    Beta Blocker was Prescribed at discharge      Plavix for poor targets per surgeon at discharge  The patient was discharged on ongoing diuretic therapy with Torsemide 20 mg, PO QD and Potassium Chloride 20 mEq, PO QD10  They were advised to continue these medications for 10 days, unless otherwise directed  Narcotic pain medication was prescribed in the form of Oxycodone  Prior to prescribing, their prescription profile was reviewed on the [facility] of health prescription drug monitoring program     The patient was informed that following their postoperative surgical evaluation, they will be referred to outpatient cardiac rehabilitation  They were counseled that this program is run by specialists who will help them safely strengthen their heart and prevent more heart disease  Cardiac rehabilitation will include exercise, relaxation, stress management, and heart-healthy nutrition  Caregivers will also check to make sure their medication regimen is working      During this admission, the patient was questioned on their use of tobacco, alcohol, and illicit/non-prescription drug use in the  previous 24 months  Johnson Joyner states that they have not used any of these substances in this time frame  I spent 30 minutes discharging the patient  This time was spent on the day of discharge  I had direct contact with the patient on the day of discharge  Additional documentation is required if more than 30 minutes were spent on discharge       Verito Whitley PA-C  DATE: January 16, 2022  TIME: 7:50 AM

## 2022-01-16 NOTE — PROGRESS NOTES
Progress Note - Cardiothoracic Surgery   [facility] 60 y o  male MRN: [id number]  Unit/Bed#: [facility] 429-01 Encounter: [id number]    Coronary artery disease  S/P coronary artery bypass grafting; POD # 3      24 Hour Events: Adequate UOP throughout day, Lasix increased to TID & CTs removed  No other events  No complaints      Medications:   Scheduled Meds:  Current Facility-Administered Medications   Medication Dose Route Frequency Provider Last Rate    acetaminophen  650 mg Rectal Q4H PRN Melody Tyler PA-C      acetaminophen  975 mg Oral Q8H Melody Tyler PA-C      amiodarone  200 mg Oral Q8H [address] Melody Tyler PA-C      aspirin  81 mg Oral Daily Melody Tyler PA-C      atorvastatin  80 mg Oral Daily With [employer]      bisacodyl  10 mg Rectal Daily PRN Melody Tyler PA-C      chlorhexidine  15 mL Swish & Spit BID Melody Tyler PA-C      clonazePAM  0 5 mg Oral BID PRN Melody Tyler PA-C      clopidogrel  75 mg Oral Daily Melody Tyler PA-C      docusate sodium  100 mg Oral BID Melody Tyler PA-C      ezetimibe  10 mg Oral Daily Melody Tyler PA-C      fondaparinux  2 5 mg Subcutaneous Daily Melody Tyler PA-C      furosemide  40 mg Intravenous TID (diuretic) Melody Tyler PA-C      hydrALAZINE  10 mg Intravenous Q6H PRN Yuli Haddad PA-C      insulin lispro  1-5 Units Subcutaneous HS Melody Tyler PA-C      insulin lispro  1-5 Units Subcutaneous TID AC Melody Tyler PA-C      lidocaine (cardiac)  100 mg Intravenous Q30 Min PRN Melody Tyler PA-C      lisinopril  10 mg Oral Daily Yuli Haddad PA-C      metoprolol tartrate  25 mg Oral Q12H [address] Melody Tyler PA-C      mirtazapine  45 mg Oral HS Melody Tyler PA-C      mupirocin  1 application Nasal R92G [address] Melody Tyler PA-C      ondansetron  4 mg Intravenous Q6H PRN Melody Tyler PA-C      oxyCODONE  10 mg Oral Q4H PRN Melody Tyler PA-C      oxyCODONE  5 mg Oral Q4H PRN Melody EVARISTO Tyler      pantoprazole  40 mg Oral Early Morning Melody Tyler PA-C      polyethylene glycol  17 g Oral Daily Melody Tyler PA-C      potassium chloride  40 mEq Oral TID With Meals Melody Tyler PA-C      QUEtiapine  100 mg Oral HS Melody Tyler PA-C      temazepam  15 mg Oral HS PRN Melody Tyler PA-C      vilazodone  40 mg Oral Daily With Breakfast Melody Tyler PA-C       Continuous Infusions:   PRN Meds:   acetaminophen    bisacodyl    clonazePAM    hydrALAZINE    lidocaine (cardiac)    ondansetron    oxyCODONE    oxyCODONE    temazepam    Vitals:   Vitals:    01/15/22 2302 01/16/22 0236 01/16/22 0544 01/16/22 0550   BP: 147/84 168/96 143/92    Pulse: 87 83 93    Resp:  14     Temp: 99 1 °F (37 3 °C) 98 9 °F (37 2 °C)     TempSrc:       SpO2: 93% 95% 95%    Weight:    69 8 kg (153 lb 12 8 oz)   Height:         Bp x24hrs: 130-160/70-90    Telemetry: Sinus Tachycardia; Heart Rate: 102; no events    Respiratory:   SpO2: SpO2: 95 %, SpO2 Activity: SpO2 Activity: At Rest, SpO2 Device: O2 Device: None (Room air);  Room Air    Intake/Output:     Intake/Output Summary (Last 24 hours) at 1/16/2022 0630  Last data filed at 1/16/2022 0554  Gross per 24 hour   Intake --   Output 1730 ml   Net -1730 ml      UOP - 875cc/8hrs; 1650cc/24hrs w/ 1 unmeasured occurance    Chest tube Output:  CTs & EPWs have been discontinued    Weights:   Weight (last 2 days)     Date/Time Weight    01/16/22 0550 69 8 (153 8)    01/15/22 0512 72 (158 73)    01/14/22 0536 72 9 (160 72)        Admission weight: 70 8kg - down 1kg from admission weight    Results:   Results from last 7 days   Lab Units 01/16/22  0444 01/15/22  0504 01/14/22  0357   WBC Thousand/uL 10 29* 10 98* 15 44*   HEMOGLOBIN g/dL 12 2 12 4 14 6   HEMATOCRIT % 36 5 36 2* 42 3   PLATELETS Thousands/uL 170 165 182     Results from last 7 days   Lab Units 01/16/22  0444 01/15/22  0504 01/14/22  0357 01/13/22  1604 01/13/22  1152   SODIUM mmol/L 138 137 138   < >  --    POTASSIUM mmol/L 4 2 3 6 3 9   < >  --    CHLORIDE mmol/L 108 104 106   < >  --    CO2 mmol/L 25 29 26   < >  --    CO2, I-STAT mmol/L  --   --   --   --  27   BUN mg/dL 16 18 8   < >  --    CREATININE mg/dL 0 66 0 76 0 55*   < >  --    GLUCOSE, ISTAT mg/dl  --   --   --   --  157*   CALCIUM mg/dL 8 7 8 6 8 1*   < >  --     < > = values in this interval not displayed  Point of care glucose: 110 - 102 - 123 - 143 - 107 - 132 - 124    Studies:  No new studies    I have personally reviewed pertinent reports  and I have personally reviewed pertinent films in PACS    Invasive Lines/Tubes:  Invasive Devices  Report    Central Venous Catheter Line            CVC Central Lines 01/13/22 Triple Right Internal jugular 2 days          Peripheral Intravenous Line            Peripheral IV 01/13/22 Left Antecubital 2 days          Drain            Chest Tube 1 Left Pleural 32 Fr  2 days    Chest Tube 2 Posterior Mediastinal 32 Fr  2 days    Chest Tube 3 Anterior Mediastinal 32 Fr  2 days                Physical Exam:    HEENT/NECK:  Normocephalic  Atraumatic  No jugular venous distention  Cardiac: Regular rate and rhythm and No murmurs/rubs/gallops  Pulmonary:  Breath sounds clear bilaterally and No rales/rhonchi/wheezes  Abdomen:  Non-tender, Non-distended and Normal bowel sounds  Incisions: Sternum is stable  Incision dressed with Acticoat  No erythema or drainage and Groin puncture sites dressed with sterile dressing  No hematoma, erythema, or drainage  Extremities: Extremities warm/dry and Trace edema B/L  Neuro: Alert and oriented X 3  Skin: Warm/Dry, without rashes or lesions      Assessment:  Principal Problem:    Coronary artery disease involving native coronary artery of native heart with angina pectoris (HCC)  Active Problems:    Primary hypertension    Anxiety with depression    Chest pain with high risk for cardiac etiology    Mixed hyperlipidemia    Chronic pain syndrome NSTEMI (non-ST elevated myocardial infarction) (HCC)    S/P CABG x 1    Leukocytosis       Coronary artery disease  S/P coronary artery bypass grafting; POD # 3    Plan:    1  Cardiac:   NSR; Hypertensive  Continue Lopressor, 25mg PO BID  -- increase to 50mg  Continue Lisinopril 10mg PO QDay -- change to home Verapamil for cluster HA at home  Continue ASA and Statin therapy  Continue Plavix  Continue Zetia  Epicardial pacing wires out  Central IV access no longer required; Remove central venous catheter today  Continue DVT prophylaxis    2  Pulmonary:   Good Room air oxygen saturation; Continue incentive spirometry/Coughing/Deep breathing exercises  Chest tubes have been discontinued    3  Renal:   Intake/Output net: (-)1730 mL/24 hours  Diuretic Regimen:  Continue Lasix 40 mg IV TID  Continue Potassium Chloride 20 mEq PO TID  Post op Creatinine stable; Follow up labs prn    4  Neuro:  Neurologically intact; No active issues  Incisional pain well-controlled  Continue Tylenol, 975 mg PO q 8, standing dose  Continue Oxycodone, 2 5 to 5 mg PO q 4 hours prn pain   Continue Remeron   Continue Seroquel   Continue Viibryd    5  GI:  Tolerating TLC 2 3 gm sodium diet  Maintain 1800 mL daily fluid restriction   Continue stool softeners and prn suppository  Continue GI prophylaxis    6  Endo:   Glucose well-controlled with sliding scale coverage    7    Hematology:    Leukocytosis, afebrile, likely reactive    8     Disposition:      Ambulating independently, Anticipate discharge to home today if bp stable s/p AM meds     VTE Pharmacologic Prophylaxis: Fondaparinux (Arixtra)  VTE Mechanical Prophylaxis: sequential compression device    Collaborative rounds completed with supervising physician  Plan of care discussed with bedside nurse    SIGNATURE: Kavita Campos PA-C  DATE: January 16, 2022  TIME: 6:30 AM

## 2022-01-16 NOTE — DISCHARGE INSTRUCTIONS
Sternal Precautions   WHAT YOU NEED TO KNOW:   What are sternal precautions? Sternal precautions are used to help protect your sternum (breastbone) after open chest surgery  Wires are placed during surgery to hold the sternum together as it heals  Sternal precautions help prevent the wires from cutting through the sternum  The precautions also help prevent the sternum from coming apart from an injury, and prevent pain and bleeding  You may need to use the precautions for up to 12 weeks after surgery  Your surgeon will give you specific instructions based on the type of surgery you had  It is important to follow the instructions carefully  An injury to the healing sternum can be life-threatening  What are some general sternal precautions? Start slowly and do more as you get stronger  Pain medicine might make it harder for you to know when to slow down or be careful  Stop immediately if you hear a crunch or pop in your sternum  · Protect your sternum  Hug a pillow to your chest or cross your arms over your chest when you laugh, sneeze, or cough  · Be careful when you get into or out of a chair or bed  Hug a pillow or cross your arms when you stand or sit  Do not twist as you move  Use only your legs to sit and stand  You may need to use a raised toilet seat if you have trouble standing up without using your arms  Your healthcare provider may teach you to use your elbow for support as you move from lying to sitting  · Ask when you may take a bath or shower  You may need to use a bath chair if you have trouble getting into or out of the tub  Do not use a grab bar  · Do not lift or carry anything heavier than 10 pounds  For example, a gallon of milk weighs 8 pounds  · Keep your arms down as much as possible  Do not put your arms out to the side, behind you, or over your head  Do not let anyone pull your arms to help you move or dress  Do not reach for items  · Do not push or pull anything  Examples include a car door or a vacuum   · Do not drive while you are healing  Your surgeon will tell you when it is safe for you to start driving again  How do I care for my surgery wound? Always wash your hands before you care for your wound  Wash your wound as directed  Do not rub the wound as you wash or dry the area  Pat the area dry with a clean towel  Change the bandages as directed and when they get wet or dirty  Do not smoke:  Nicotine can damage blood vessels and make it more difficult to heal  Do not use e-cigarettes or smokeless tobacco in place of cigarettes or to help you quit  They still contain nicotine  Ask your healthcare provider for information if you currently smoke and need help quitting  Call 911 for any of the following:   · You have sudden pain in your sternum and hear a crunch or pop  · You have bleeding that does not stop even after you apply pressure for 5 minutes  When should I seek immediate care? · You hear crunching or grinding in your sternum  · You have signs of an infection, such as a fever, red or warm skin, or pus in the surgery wound  When should I contact my healthcare provider? · You continue to feel pain, even after you take your pain medicine  · You have new or worsening pain, or any pain with movement  · You have questions or concerns about your condition or care  CARE AGREEMENT:   You have the right to help plan your care  Learn about your health condition and how it may be treated  Discuss treatment options with your healthcare providers to decide what care you want to receive  You always have the right to refuse treatment  The above information is an  only  It is not intended as medical advice for individual conditions or treatments  Talk to your doctor, nurse or pharmacist before following any medical regimen to see if it is safe and effective for you    © Copyright [employer] 2021 Information is for End User's use only and may not be sold, redistributed or otherwise used for commercial purposes  All illustrations and images included in CareNotes® are the copyrighted property of A D A M [employer]  or [place] [employer]       CABG (Coronary Artery Bypass Graft)   WHAT YOU NEED TO KNOW:   A CABG is open heart surgery to clear blocked arteries in your heart  CABG surgery improves blood flow to your heart by bypassing (sending blood around) the blocked part of an artery  This restores blood flow to your heart and helps prevent a heart attack  DISCHARGE INSTRUCTIONS:   Call your local emergency number (911 in the [address] [facility] [address]) if:   · You feel lightheaded, short of breath, and have chest pain  · You cough up blood  · You have a fast heartbeat that flutters  · You feel like you are going to faint  Call your doctor if:   · Your arm or leg feels warm, tender, and painful  It may look swollen and red  · You have numbness or tingling in your arms or legs  · You have a severe headache  · You have a fever higher than 101°F (38 4°C)  · You have gained 2 pounds in 24 hours  · Your wound is red, swollen, or draining pus  · Your signs and symptoms return  · You feel depressed  · You have questions or concerns about your condition or care  Medicines: You may need any of the following:  · Prescription pain medicine  may be given  Ask how to take this medicine safely  · Antiplatelets , such as aspirin, help prevent blood clots  Take your antiplatelet medicine exactly as directed  These medicines make it more likely for you to bleed or bruise  If you are told to take aspirin, do not take acetaminophen or ibuprofen instead  · Cholesterol medicine  helps lower cholesterol and lipid levels in your blood  · Antibiotics  help prevent a bacterial infection  · Heart medicine  helps strengthen and regulate your heartbeat      · Blood pressure medicine  helps lower or control your blood pressure  · Take your medicine as directed  Contact your healthcare provider if you think your medicine is not helping or if you have side effects  Tell him or her if you are allergic to any medicine  Keep a list of the medicines, vitamins, and herbs you take  Include the amounts, and when and why you take them  Bring the list or the pill bottles to follow-up visits  Carry your medicine list with you in case of an emergency  Go to cardiac rehabilitation:  Cardiac rehabilitation (rehab) is a program run by specialists who will help you safely strengthen your heart and prevent more heart disease  This plan includes exercise, relaxation, stress management, and heart-healthy nutrition  Healthcare providers will also check to make sure any medicines you take are working  The plan may also include instructions for when you can drive, return to work, and do other normal daily activities  Care for your wound as directed:  Carefully wash the wound with soap and water  If you do not have a bandage, gently pat the incision dry with a clean towel  If you have a bandage, dry the area and put on a new, clean bandage  Change your bandage if it gets wet or dirty  Prevent another blocked artery:   · Manage other health conditions  Diabetes and high cholesterol puts you at risk for a heart attack and stroke  Talk to your healthcare provider about your management plan  He or she will make a plan that helps you manage your conditions  · Eat heart healthy foods  You may need to eat foods that are low in salt, fat, or cholesterol  Healthy foods include fruits, vegetables, whole-grain breads, low-fat dairy products, beans, lean meats, and fish  Ask your healthcare provider for more information about a heart healthy diet  · Do not smoke  Nicotine and other chemicals in cigarettes and cigars can cause heart and lung damage  Ask your healthcare provider for information if you currently smoke and need help to quit  E-cigarettes or smokeless tobacco still contain nicotine  Talk to your healthcare provider before you use these products  · Maintain a healthy weight  Ask your healthcare provider how much you should weigh  Extra weight can increase the stress on your heart  Ask him or her to help you create a weight loss plan if you are overweight  Get a flu shot: The flu can be dangerous for a person who has heart disease  All adults should get the flu vaccine every year as soon as it becomes available  Follow up with your doctor as directed:  Write down your questions so you remember to ask them during your visits  © Copyright [employer] 2021 Information is for End User's use only and may not be sold, redistributed or otherwise used for commercial purposes  All illustrations and images included in CareNotes® are the copyrighted property of A D A M , Inc  or [facility] [employer]   The above information is an  only  It is not intended as medical advice for individual conditions or treatments  Talk to your doctor, nurse or pharmacist before following any medical regimen to see if it is safe and effective for you  Heart Healthy Diet   WHAT YOU NEED TO KNOW:   What is a heart healthy diet? A heart healthy diet is an eating plan low in unhealthy fats and sodium (salt)  The plan is high in healthy fats and fiber  A heart healthy diet helps improve your cholesterol levels and lowers your risk for heart disease and stroke  A dietitian will teach you how to read and understand food labels  What diet guidelines should I follow? · Choose foods that contain healthy fats  ? Unsaturated fats  include monounsaturated and polyunsaturated fats  Unsaturated fat is found in foods such as soybean, canola, olive, corn, and safflower oils  It is also found in soft tub margarine that is made with liquid vegetable oil      ? Omega-3 fat  is found in certain fish, such as salmon, tuna, and trout, and in walnuts and flaxseed  Eat fish high in omega-3 fats at least 2 times a week  · Get 20 to 30 grams of fiber each day  Fruits, vegetables, whole-grain foods, and legumes (cooked beans) are good sources of fiber  · Limit or do not have unhealthy fats  ? Cholesterol  is found in animal foods, such as eggs and lobster, and in dairy products made from whole milk  Limit cholesterol to less than 200 mg each day  ? Saturated fat  is found in meats, such as allison and hamburger  It is also found in chicken or turkey skin, whole milk, and butter  Limit saturated fat to less than 7% of your total daily calories  ? Trans fat  is found in packaged foods, such as potato chips and cookies  It is also in hard margarine, some fried foods, and shortening  Do not eat foods that contain trans fats  · Limit sodium as directed  You may be told to limit sodium to 2,000 to 2,300 mg each day  Choose low-sodium or no-salt-added foods  Add little or no salt to food you prepare  Use herbs and spices in place of salt  What can I include in my heart healthy plan? Ask your dietitian or healthcare provider how many servings to have from each of the following food groups:  · Grains:      ? Whole-wheat breads, cereals, and pastas, and brown rice    ? Low-fat, low-sodium crackers and chips    · Vegetables:      ? Broccoli, green beans, green peas, and spinach    ? Collards, kale, and lima beans    ? Carrots, sweet potatoes, tomatoes, and peppers    ? Canned vegetables with no salt added    · Fruits:      ? Bananas, peaches, pears, and pineapple    ? Grapes, raisins, and dates    ? Oranges, tangerines, grapefruit, orange juice, and grapefruit juice    ? Apricots, mangoes, melons, and papaya    ? Raspberries and strawberries    ? Canned fruit with no added sugar    · Low-fat dairy:      ? Nonfat (skim) milk, 1% milk, and low-fat almond, cashew, or soy milks fortified with calcium    ?  Low-fat cheese, regular or frozen yogurt, and cottage cheese    · Meats and proteins:      ? Lean cuts of beef and pork (loin, leg, round), skinless chicken and turkey    ? Legumes, soy products, egg whites, or nuts    What should I limit or not include in my heart healthy plan? · Unhealthy fats and oils:      ? Whole or 2% milk, cream cheese, sour cream, or cheese    ? High-fat cuts of beef (T-bone steaks, ribs), chicken or turkey with skin, and organ meats such as liver    ? Butter, stick margarine, shortening, and cooking oils such as coconut or palm oil    · Foods and liquids high in sodium:      ? Packaged foods, such as frozen dinners, cookies, macaroni and cheese, and cereals with more than 300 mg of sodium per serving    ? Vegetables with added sodium, such as instant potatoes, vegetables with added sauces, or regular canned vegetables    ? Cured or smoked meats, such as hot dogs, allison, and sausage    ? High-sodium ketchup, barbecue sauce, salad dressing, pickles, olives, soy sauce, or miso    · Foods and liquids high in sugar:      ? Candy, cake, cookies, pies, or doughnuts    ? Soft drinks (soda), sports drinks, or sweetened tea    ? Canned or dry mixes for cakes, soups, sauces, or gravies    What other guidelines should I follow for a healthy heart? · Do not smoke  Nicotine and other chemicals in cigarettes and cigars can cause lung and heart damage  Ask your healthcare provider for information if you currently smoke and need help to quit  E-cigarettes or smokeless tobacco still contain nicotine  Talk to your healthcare provider before you use these products  · Limit or do not drink alcohol as directed  Alcohol can damage your heart and raise your blood pressure  Your healthcare provider may give you specific daily and weekly limits  The general recommended limit is 1 drink a day for women 21 or older and for men 72 or older  Do not have more than 3 drinks in a day or 7 in a week   The recommended limit is 2 drinks a day for men 21 to 59years of age  Do not have more than 4 drinks in a day or 14 in a week  A drink of alcohol is 12 ounces of beer, 5 ounces of wine, or 1½ ounces of liquor  · Exercise regularly  Exercise can help you maintain a healthy weight and improve your blood pressure and cholesterol levels  Regular exercise can also decrease your risk for heart problems  Ask your healthcare provider about the best exercise plan for you  Do not start an exercise program without asking your healthcare provider  CARE AGREEMENT:   You have the right to help plan your care  Discuss treatment options with your healthcare provider to decide what care you want to receive  You always have the right to refuse treatment  The above information is an  only  It is not intended as medical advice for individual conditions or treatments  Talk to your doctor, nurse or pharmacist before following any medical regimen to see if it is safe and effective for you  © [employer] 2021 Information is for End User's use only and may not be sold, redistributed or otherwise used for commercial purposes  All illustrations and images included in CareNotes® are the copyrighted property of [employer]  or [address] [place] [address]   WHAT YOU NEED TO KNOW:   What do I need to know about narcotic safety? Safety includes the correct use, storage, and disposal of narcotics  Examples of narcotic pain medicines are oxycodone, morphine, fentanyl, and codeine  How are narcotics given? Narcotics can be given as a pill, patch, or suppository  They can also be given as an injection into a vein, near a nerve, or into a joint  Your prescription may include one or both of the following:  · Short-acting narcotics  work fast and relieve pain for about 3 to 6 hours  They are often used for acute or breakthrough pain  · Long-acting narcotics  usually last at least 8 hours   You can take them less often and they may be used for chronic pain  How do I use narcotics safely? · Take prescribed narcotics exactly as directed  Narcotics come with directions based on the kind and how it is given  Talk to your healthcare provider or a pharmacist if you have any questions  Do not take more than the recommended amount  Too much can cause a life-threatening overdose  Do not continue to take it after your pain stops  You may develop tolerance  This means you keep needing higher doses to get the same effect  You may also develop narcotic use disorder  This means you are not able to control your narcotic use  · Do not give narcotics to others or take narcotics that belong to someone else  The kind or amount one person takes may not be right for another  The person you share them with may also be taking medicines that do not mix with narcotics  He or she may drink alcohol or use other drugs that can cause life-threatening problems when mixed with narcotics  · Do not mix narcotics with other medicines or alcohol  The combination can cause an overdose, or cause you to stop breathing  Alcohol, sleeping pills, and medicines such as antihistamines can make you sleepy  A combination with narcotics can lead to a coma  · Do not drive or operate heavy machinery after you use a narcotic  You may feel drowsy or have trouble concentrating  You can injure yourself or others if you drive or use heavy machinery when you are not alert  Your provider or pharmacist can tell you how long to wait after a dose before you do these activities  · Talk to your healthcare provider if you have any side effects  Side effects include nausea, sleepiness, itching, and trouble thinking clearly  Your provider may need to make changes to the kind or amount of narcotic you are taking  He or she can also help you find ways to prevent or relieve side effects  What can I do to manage constipation? Constipation is the most common side effect of narcotic medicine  Constipation is when you have hard, dry bowel movements, or you go longer than usual between bowel movements  Tell your healthcare provider about all changes in your bowel movements while you are taking narcotics  He or she may recommend laxative medicine to help you have a bowel movement  He or she may also change the kind of narcotic you are taking, or change when you take it  The following are more ways you can prevent or relieve constipation:  · Drink liquids as directed  You may need to drink extra liquids to help soften and move your bowels  Ask how much liquid to drink each day and which liquids are best for you  · Eat high-fiber foods  This may help decrease constipation by adding bulk to your bowel movements  High-fiber foods include fruits, vegetables, whole-grain breads and cereals, and beans  Your healthcare provider or dietitian can help you create a high-fiber meal plan  Your provider may also recommend a fiber supplement if you cannot get enough fiber from food  · Exercise regularly  Regular physical activity can help stimulate your intestines  Walking is a good exercise to prevent or relieve constipation  Ask which exercises are best for you  · Schedule a time each day to have a bowel movement  This may help train your body to have regular bowel movements  Bend forward while you are on the toilet to help move the bowel movement out  Sit on the toilet for at least 10 minutes, even if you do not have a bowel movement  How do I store narcotics safely? · Store narcotics where others cannot easily get them  Keep them in a locked cabinet or secure area  Do not  keep them in a purse or other bag you carry with you  A person may be looking for something else and find the narcotics  · Make sure narcotics are stored out of the reach of children  A child can easily overdose on narcotics  Narcotics may look like candy to a small child      What is the best way to dispose of narcotics? The laws vary by country and area  In the [place], the best way is to return the narcotics through a take-back program  This program is offered by the [employer])  The following are options for using the program:  · Take the narcotics to a SHAJI collection site  The site is often a law enforcement center  Call your local law enforcement center for scheduled take-back days in your area  You will be given information on where to go if the collection site is in a different location  · Take the narcotics to an approved pharmacy or hospital   A pharmacy or hospital may be set up as a collection site  You will need to ask if it is a SHAJI collection site if you were not directed there  A pharmacy or doctor's office may not be able to take back narcotics unless it is a SHAJI site  · Use a mail-back system  This means you are given containers to put the narcotics into  You will then mail them in the containers  · Use a take-back drop box  This is a place to leave the narcotics at any time  People and animals will not be able to get into the box  Your local law enforcement agency can tell you where to find a drop box in your area  What are some other safe ways to dispose of narcotics? The medicine may come with disposal instructions  The instructions may vary depending on the brand of medicine you are using  Instructions may come in a Medication Guide, but not every medicine has one  You may instead get instructions from your pharmacy or doctor  Follow instructions carefully  The following are general guidelines to follow:  · Find out if you can flush the narcotic  Some narcotics can be flushed down the toilet or poured into the sink  You will need to contact authorities in your area to see if this is an option for you  The FDA also offers a list of medicines that are safe to flush down the toilet   You can check the list if you cannot get the information for your local area     · Ask your waste management company about rules for putting narcotics in the trash  The company will be able to give you specific directions  Scratch out personal information on the original medicine label so it cannot be read  Then put it in the trash  Do not label the trash or put any information on it about the narcotics  It should look like regular household trash so no one is tempted to look for the narcotics  Keep the trash out of the reach of children and animals  Always make sure trash is secure  · Talk to officials if you live in a facility  If you live in a nursing home or assisted living center, talk to an official  The person will know the rules for your area  What are some other ways to manage pain? · Ask your healthcare provider about non-narcotic medicines to control pain  Some medicines may even work better than narcotics, depending on the cause of your pain  Nonprescription medicines include NSAIDs (such as ibuprofen) and acetaminophen  Prescription medicines include muscle relaxers, antidepressants, and steroids  · Pain may be managed without any medicines  Some ways to relieve pain include massage, aromatherapy, or meditation  Physical or occupational therapy may also help  Where can I find more information? · Drug Enforcement Administration  [facility] [address]  Phone: 2- 512 - [phone]  Web Address: [facility]/drug_[email or web address]    · [address]  and Drug Administration  [facility] , [address] [place] [address]  Phone: 6- 098 - [phone]  Web Address: http://[employer]/    Call your local emergency number (911 in the [address] [facility]), or have someone else call if:   · You have a seizure  · You cannot be woken  · You have trouble staying awake and your breathing is slow or shallow  · Your speech is slurred, or you are confused  · You are dizzy or stumble when you walk      When should I or someone close to me call my doctor? · You are extremely drowsy, or you have trouble staying awake or speaking  · You have pale or clammy skin  · You have blue fingernails or lips  · Your heartbeat is slower than normal     · You cannot stop vomiting  · You have questions or concerns about your condition or care  CARE AGREEMENT:   You have the right to help plan your care  Learn about your health condition and how it may be treated  Discuss treatment options with your healthcare providers to decide what care you want to receive  You always have the right to refuse treatment  The above information is an  only  It is not intended as medical advice for individual conditions or treatments  Talk to your doctor, nurse or pharmacist before following any medical regimen to see if it is safe and effective for you  © Copyright [employer] 2021 Information is for End User's use only and may not be sold, redistributed or otherwise used for commercial purposes   All illustrations and images included in CareNotes® are the copyrighted property of [employer]  or [address] [place] [address]

## 2022-01-17 RX ORDER — METOPROLOL TARTRATE 50 MG/1
25 TABLET, FILM COATED ORAL EVERY 12 HOURS SCHEDULED
Qty: 60 TABLET | Refills: 2
Start: 2022-01-17 | End: 2022-02-28 | Stop reason: SDUPTHER

## 2022-01-17 NOTE — UTILIZATION REVIEW
Notification of Discharge   This is a Notification of Discharge from our facility [address]  Please be advised that this patient has been discharge from our facility  Below you will find the admission and discharge date and time including the patients disposition  UTILIZATION REVIEW CONTACT:  Jerry Anderson  Utilization   Network Utilization Review Department  Phone: [phone] x carefully listen to the prompts  All voicemails are confidential   Email: [email or web address]     PHYSICIAN ADVISORY SERVICES:  FOR [id number] REVIEW - MEDICAL NECESSITY DENIAL  Phone: [phone]  Fax: [phone]  Email: [email or web address]     PRESENTATION DATE: 1/13/2022  5:19 AM  OBERVATION ADMISSION DATE:   INPATIENT ADMISSION DATE: 1/13/22  6:40 AM   DISCHARGE DATE: 1/16/2022  3:00 PM  DISPOSITION: Home with [place] with [address] INFORMATION:  Send all requests for admission clinical reviews, approved or denied determinations and any other requests to dedicated fax number below belonging to the campus where the patient is receiving treatment   List of dedicated fax numbers:  [address] DENIALS (Administrative/Medical Necessity) [phone]   [address] (Maternity/NICU/Pediatrics) [phone]   Sandi Saab [phone]   Rayo Talleying [phone]   Luna Russell [phone]   [address] [phone]   [facility]  [phone]   [address] [place] [address], [facility]  24[address] [facility] 1000 [address] [facility] [phone]

## 2022-01-18 LAB
ATRIAL RATE: 100 BPM
ATRIAL RATE: 65 BPM
ATRIAL RATE: 66 BPM
ATRIAL RATE: 92 BPM
P AXIS: 74 DEGREES
P AXIS: 80 DEGREES
P AXIS: 81 DEGREES
P AXIS: 86 DEGREES
PR INTERVAL: 150 MS
PR INTERVAL: 156 MS
PR INTERVAL: 170 MS
PR INTERVAL: 190 MS
QRS AXIS: -31 DEGREES
QRS AXIS: 17 DEGREES
QRS AXIS: 23 DEGREES
QRS AXIS: 24 DEGREES
QRSD INTERVAL: 78 MS
QRSD INTERVAL: 78 MS
QRSD INTERVAL: 84 MS
QRSD INTERVAL: 92 MS
QT INTERVAL: 356 MS
QT INTERVAL: 370 MS
QT INTERVAL: 462 MS
QT INTERVAL: 482 MS
QTC INTERVAL: 457 MS
QTC INTERVAL: 459 MS
QTC INTERVAL: 484 MS
QTC INTERVAL: 501 MS
T WAVE AXIS: 127 DEGREES
T WAVE AXIS: 80 DEGREES
T WAVE AXIS: 82 DEGREES
T WAVE AXIS: 94 DEGREES
VENTRICULAR RATE: 100 BPM
VENTRICULAR RATE: 65 BPM
VENTRICULAR RATE: 66 BPM
VENTRICULAR RATE: 92 BPM

## 2022-01-18 PROCEDURE — 93010 ELECTROCARDIOGRAM REPORT: CPT | Performed by: INTERNAL MEDICINE

## 2022-01-19 VITALS
WEIGHT: 153.8 LBS | TEMPERATURE: 97.6 F | HEART RATE: 68 BPM | DIASTOLIC BLOOD PRESSURE: 58 MMHG | RESPIRATION RATE: 14 BRPM | OXYGEN SATURATION: 97 % | SYSTOLIC BLOOD PRESSURE: 95 MMHG | HEIGHT: 70 IN | BODY MASS INDEX: 22.02 KG/M2

## 2022-01-20 ENCOUNTER — TELEPHONE (OUTPATIENT)
Dept: CARDIAC SURGERY | Facility: CLINIC | Age: 57
End: 2022-01-20

## 2022-01-20 NOTE — TELEPHONE ENCOUNTER
Shirlene Osborne was called today for routine post discharge follow up  Following discharge from the hospital he denies any significant incisional discomfort  He has self-weaned from narcotic pain medication  He is ambulating progressively further distances without significant fatigability or weakness  He does note persistent dry mouth and altered sensation of taste  I reassured him that this likely residual affects of surgical anesthesia; it should resolve without further intervention in the upcoming weeks  He is scheduled to see his primary care physician for routine follow-up today  I have advised him to contact our office should he have any further questions or concerns before his scheduled follow-up in 3 weeks  All questions were answered to their satisfaction during our discussion today  I advised them to contact our office prior to their scheduled follow-up should anything change      Kacie Anderson PA-C  01/20/22  2:24 PM

## 2022-01-25 NOTE — PROGRESS NOTES
Cardiology Office Follow Up  [facility]  1965  [id number]    ASSESSMENT:  Coronary artery disease   - S/P CABG x1 with LIMA to LAD, 1/13/22     - NSTEMI S/P PCI to proximal LAD in 12/2021  After stent placement, ostial LAD lesion of 95%  - history of anterior STEMI S/P PCI of LAD in 2009    - Rx, DAPT with plavix 75 mg daily + aspirin 81 mg daily  Ischemic cardiomyopathy with improved LV function   - LVEF 55%, severe hypokinesis of the mid anteroseptal and apical septal walls  Apical akinesis  Grade 1 diastolic dysfunction  Mild MAC, mild MV calcification, mild MR, 12/27/21  Hypertension    - Rx, metoprolol tartrate 25 mg BID  Hyperlipidemia    - Rx, ezetimibe 10 mg daily, atorvastatin 80 mg daily  - most recent lipid panel 12/27/21: Cholesterol 125, triglycerides 155, HDL 31, LDL 63     Other:  - GERD  - anxiety/depression   - DDD  - thoracic spondylosis S/P spinal nerve blocks   - cluster HA, on verapamil  - arthritis     PLAN:  · Continue ezetimibe 10 mg daily, atorvastatin 80 mg daily  · Continue metoprolol tartrate 25 mg BID + verapamil 80 mg BID for cluster headaches  · Continue dual anti-platelet therapy with Plavix 75 mg daily + aspirin 81 mg daily  New Plavix script sent to pharmacy  · Repeat BMP ordered to ensure renal and electrolyte stability as patient was on torsemide and potassium supplementation post discharge from surgery  · Educated patient on the importance of attending cardiac rehab; patient is agreeable to attend  · Patient will follow-up with primary cardiologist, Dr Clarke Palacios, in one month  Additionally, patient has an appointment with CT surgery as well  Interval History/ HPI:   49-year-old male most recently admitted to [facility] on 12/23 through 12/28 secondary to precordial chest pain  Patient was found to have an NSTEMI and underwent cardiac catheterization with PCI to proximal LAD  After stent placement, there was an ostial LAD lesion of 95%    Patient was recommended to be transferred to [facility] for surgical evaluation, but the patient signed out against medical advise as he did not want to wait for a bed to open  Patient was seen by CT surgery as an outpatient on 21  During visit, risks and benefits of coronary artery bypass grafting were discussed with the patient and he had wish to pursue surgical intervention  Patient was then admitted on 22 through 22 for coronary artery bypass graft x 1 with LIMA to LAD  Upon discharge, Ace inhibitor was discontinued in verapamil was resumed due to history of cluster headaches  Upon assessment and evaluation during office visit, patient has been doing very well post surgery  He denies cardiac complaints including shortness of breath, chest pain, dizziness, lightheadedness, syncope, presyncope  As noted above, Education provided regarding cardiac rehab  Patient is agreeable to attend cardiac rehab       Vitals:  /64 (BP Location: Right arm, Patient Position: Sitting, Cuff Size: Adult)   Pulse 88   Wt 68 2 kg (150 lb 6 4 oz)   BMI 21 58 kg/m²     Past Medical History:   Diagnosis Date    Angina pectoris (HCC)     Anxiety     Arthritis     Back pain     CAD (coronary artery disease)     DDD (degenerative disc disease), lumbosacral     Depressed     Full dentures     GERD (gastroesophageal reflux disease)     Hyperlipidemia     Hypertension     MI [facility]myocardial infarction) ([facility] [address] )     2009    Wears glasses      Social History     Socioeconomic History    Marital status: /Civil Union     Spouse name: Not on file    Number of children: Not on file    Years of education: Not on file    Highest education level: Not on file   Occupational History    Not on file   Tobacco Use    Smoking status: Former Smoker     Quit date: 2008     Years since quittin 2    Smokeless tobacco: Never Used   Vaping Use    Vaping Use: Never used   Substance and Sexual Activity  Alcohol use: Never     Alcohol/week: 0 0 standard drinks    Drug use: No    Sexual activity: Not on file   Other Topics Concern    Not on file   Social History Narrative    Not on file     Social Determinants of Health     Financial Resource Strain: Not on file   Food Insecurity: No Food Insecurity    Worried About Running Out of Food in the Last Year: Never true    Vinnie of Food in the Last Year: Never true   Transportation Needs: No Transportation Needs    Lack of Transportation (Medical): No    Lack of Transportation (Non-Medical): No   Physical Activity: Not on file   Stress: Not on file   Social Connections: Not on file   Intimate Partner Violence: Not on file   Housing Stability: Low Risk     Unable to Pay for Housing in the Last Year: No    Number of Places Lived in the Last Year: 1    Unstable Housing in the Last Year: No      Family History   Problem Relation Age of Onset    Heart attack Father     Heart disease Brother      Past Surgical History:   Procedure Laterality Date    ANGIOPLASTY  2009     LAD    CARDIAC CATHETERIZATION      CARDIAC CATHETERIZATION N/A 12/27/2021    Procedure: Cardiac catheterization;  Surgeon: Myrna Hardy MD;  Location: [facility]; Service: Cardiology    CARDIAC CATHETERIZATION N/A 12/27/2021    Procedure: Cardiac Coronary Angiogram;  Surgeon: Myrna Hardy MD;  [facility]: [facility]; Service: Cardiology    CARDIAC CATHETERIZATION N/A 12/27/2021    Procedure: Cardiac pci;  Surgeon: Myrna Hardy MD;  Location: [facility]; Service: Cardiology    NASAL SINUS SURGERY      MI CABG, ARTERY-VEIN, SINGLE N/A 1/13/2022    Procedure: CORONARY ARTERY BYPASS GRAFT (CABG) x1- LIMA to LAD;  Surgeon: Yahaira Barton MD;  Location: [facility];  Service: Cardiac Surgery    MI ECHO TRANSESOPHAG [address] N/A 1/13/2022    Procedure: TRANSESOPHAGEAL ECHOCARDIOGRAM (ELADIA);   Surgeon: Yahaira Barton MD; Location[facility];  Service: Cardiac Surgery    [place] REMOVE ARMPIT LYMPH NODES SUPERFIC Right 11/21/2018    Procedure: EXCISION BIOPSY LYMPH NODE AXILLARY RIGHT;  Surgeon: Aurelio Arellano MD;  Location: [facility];  Service: General       Current Outpatient Medications:     acetaminophen (TYLENOL) 500 mg tablet, Take 500 mg by mouth every 6 (six) hours as needed for mild pain For the pain around eyes , Disp: , Rfl:     aspirin 81 MG tablet, Take 81 mg by mouth daily  , Disp: , Rfl:     atorvastatin (LIPITOR) 80 mg tablet, Take 1 tablet (80 mg total) by mouth daily, Disp: 30 tablet, Rfl: 2    clonazePAM (KlonoPIN) 1 mg tablet, TK 1/2 T PO BID PRN, Disp: , Rfl: 1    clopidogrel (PLAVIX) 75 mg tablet, Take 1 tablet (75 mg total) by mouth daily, Disp: 30 tablet, Rfl: 6    docusate sodium (COLACE) 100 mg capsule, Take 1 capsule (100 mg total) by mouth 2 (two) times a day Hold for soft stools  , Disp: 60 capsule, Rfl: 0    ezetimibe (ZETIA) 10 mg tablet, TAKE 1 TABLET(10 MG) BY MOUTH DAILY, Disp: 90 tablet, Rfl: 3    metoprolol tartrate (LOPRESSOR) 50 mg tablet, Take 0 5 tablets (25 mg total) by mouth every 12 (twelve) hours, Disp: 60 tablet, Rfl: 2    mirtazapine (REMERON) 45 MG tablet, Take 45 mg by mouth daily at bedtime  , Disp: , Rfl:     omeprazole (PriLOSEC) 20 mg delayed release capsule, Take 20 mg by mouth daily in the early morning  , Disp: , Rfl:     QUEtiapine (SEROquel) 100 mg tablet, Take 100 mg by mouth daily at bedtime  , Disp: , Rfl:     verapamil (CALAN) 80 mg tablet, Take 1 tablet (80 mg total) by mouth every 12 (twelve) hours, Disp: 180 tablet, Rfl: 1    VIIBRYD 40 MG tablet, Take 40 mg by mouth daily with breakfast  , Disp: , Rfl:     oxyCODONE (ROXICODONE) 5 immediate release tablet, Take 1/2 to 1 tablet Q4-6 hours PRN pain  (Patient not taking: Reported on 1/28/2022 ), Disp: 30 tablet, Rfl: 0    polyethylene glycol (MIRALAX) 17 g packet, Take 17 g by mouth daily Hold for soft stools  (Patient not taking: Reported on 1/28/2022 ), Disp: 30 each, Rfl: 0    potassium chloride (K-DUR,KLOR-CON) 20 mEq tablet, Take 1 tablet (20 mEq total) by mouth daily for 10 days Unless otherwise directed  (Patient not taking: Reported on 1/28/2022 ), Disp: 10 tablet, Rfl: 1    torsemide (DEMADEX) 20 mg tablet, Take 1 tablet (20 mg total) by mouth daily for 10 days Unless otherwise directed  (Patient not taking: Reported on 1/28/2022 ), Disp: 10 tablet, Rfl: 1      Review of Systems:  Review of Systems   Constitutional: Negative for activity change and appetite change  Respiratory: Negative for chest tightness, shortness of breath and wheezing  Cardiovascular: Negative for chest pain, palpitations and leg swelling  Gastrointestinal: Negative for abdominal pain  Musculoskeletal: Negative for arthralgias and myalgias  Neurological: Negative for dizziness, syncope, weakness and light-headedness  Psychiatric/Behavioral: Negative for confusion  Physical Exam:  Physical Exam  Constitutional:       Appearance: Normal appearance  HENT:      Head: Normocephalic and atraumatic  Cardiovascular:      Rate and Rhythm: Normal rate and regular rhythm  Pulses: Normal pulses  Heart sounds: Normal heart sounds  Comments: Median sternotomy and chest tube incisions dry, without erythema  Pulmonary:      Effort: Pulmonary effort is normal  No respiratory distress  Breath sounds: Normal breath sounds  Abdominal:      General: Abdomen is flat  Bowel sounds are normal  There is no distension  Musculoskeletal:         General: No swelling  Normal range of motion  Cervical back: Normal range of motion and neck supple  Right lower leg: No edema  Left lower leg: No edema  Skin:     General: Skin is warm and dry  Neurological:      General: No focal deficit present  Mental Status: He is alert and oriented to person, place, and time  Mental status is at baseline  Psychiatric:         Mood and Affect: Mood normal          Behavior: Behavior normal          Thought Content: Thought content normal          This note was completed in part utilizing [employer] Flu[employer] Direct Software  Grammatical errors, random word insertions, spelling mistakes, and incomplete sentences can be an occasional consequence of this system secondary to software limitations, ambient noise, and hardware issues  If you have any questions or concerns about the content, text, or information contained within the body of this dictation, please contact the provider for clarification

## 2022-01-28 ENCOUNTER — OFFICE VISIT (OUTPATIENT)
Dept: CARDIOLOGY CLINIC | Facility: CLINIC | Age: 57
End: 2022-01-28
Payer: MEDICARE

## 2022-01-28 VITALS
DIASTOLIC BLOOD PRESSURE: 64 MMHG | HEART RATE: 88 BPM | SYSTOLIC BLOOD PRESSURE: 110 MMHG | WEIGHT: 150.4 LBS | BODY MASS INDEX: 21.58 KG/M2

## 2022-01-28 DIAGNOSIS — I21.4 NSTEMI (NON-ST ELEVATED MYOCARDIAL INFARCTION) (HCC): ICD-10-CM

## 2022-01-28 DIAGNOSIS — Z95.1 S/P CABG X 1: ICD-10-CM

## 2022-01-28 DIAGNOSIS — I25.2 OLD MYOCARDIAL INFARCT: ICD-10-CM

## 2022-01-28 DIAGNOSIS — I25.119 CORONARY ARTERY DISEASE INVOLVING NATIVE CORONARY ARTERY OF NATIVE HEART WITH ANGINA PECTORIS (HCC): Primary | ICD-10-CM

## 2022-01-28 DIAGNOSIS — I10 PRIMARY HYPERTENSION: ICD-10-CM

## 2022-01-28 PROCEDURE — 99214 OFFICE O/P EST MOD 30 MIN: CPT

## 2022-01-28 PROCEDURE — 93000 ELECTROCARDIOGRAM COMPLETE: CPT

## 2022-01-28 RX ORDER — CLOPIDOGREL BISULFATE 75 MG/1
75 TABLET ORAL DAILY
Qty: 30 TABLET | Refills: 6 | Status: SHIPPED | OUTPATIENT
Start: 2022-01-28 | End: 2022-01-28

## 2022-01-28 RX ORDER — CLOPIDOGREL BISULFATE 75 MG/1
TABLET ORAL
Qty: 90 TABLET | Refills: 3 | Status: SHIPPED | OUTPATIENT
Start: 2022-01-28

## 2022-02-11 ENCOUNTER — OFFICE VISIT (OUTPATIENT)
Dept: CARDIAC SURGERY | Facility: CLINIC | Age: 57
End: 2022-02-11

## 2022-02-11 VITALS
SYSTOLIC BLOOD PRESSURE: 131 MMHG | BODY MASS INDEX: 22 KG/M2 | OXYGEN SATURATION: 100 % | DIASTOLIC BLOOD PRESSURE: 67 MMHG | WEIGHT: 153.3 LBS | HEART RATE: 94 BPM

## 2022-02-11 DIAGNOSIS — Z48.89 ENCOUNTER FOR POST SURGICAL WOUND CHECK: ICD-10-CM

## 2022-02-11 DIAGNOSIS — Z00.00 ROUTINE HEALTH MAINTENANCE: ICD-10-CM

## 2022-02-11 DIAGNOSIS — I25.10 CORONARY ARTERY DISEASE INVOLVING NATIVE CORONARY ARTERY OF NATIVE HEART WITHOUT ANGINA PECTORIS: Primary | ICD-10-CM

## 2022-02-11 PROCEDURE — 99024 POSTOP FOLLOW-UP VISIT: CPT | Performed by: PHYSICIAN ASSISTANT

## 2022-02-11 NOTE — PROGRESS NOTES
Procedure: S/P coronary artery bypass grafting x 1 (LIMA-LAD), performed on 1/13/21    History: Johnson Joyner is a 60y o  year old male who presents to our office today for routine follow up care from coronary artery bypass grafting x 1  His postoperative course was uneventful and he was discharged home on POD # 3  Since he has been home, Mr Fabienne Rodrigues reports he has been doing very well  He has been ambulating for 30 minutes daily  He reports his appetite is good and he has been sleeping well  For the past 4 days, Mr Fabienne Rodrigues reports the onset of left lower rib pain  He reports is it severe in nature and constant  He has taken Tylenol with no relief  Applying manual pressure to the site helps relieve it  He also notes a right clavicular popping/grinding sensation when he moves his arm  There is no pain associated with this, just the feeling of his collar bone crunching  He denies chest pain, SOB, LE edema, PND, orthopnea, weight gain, syncope, palpitations or incisional problems  He has seen his cardiologist and PCP in follow up after his hospitalization  Vital Signs:   Vitals:    02/11/22 1520   BP: 131/67   BP Location: Left arm   Patient Position: Sitting   Cuff Size: Standard   Pulse: 94   SpO2: 100%   Weight: 69 5 kg (153 lb 4 8 oz)       Home Medications:   Prior to Admission medications    Medication Sig Start Date End Date Taking? Authorizing Provider   acetaminophen (TYLENOL) 500 mg tablet Take 500 mg by mouth every 6 (six) hours as needed for mild pain For the pain around eyes     Historical Provider, MD   aspirin 81 MG tablet Take 81 mg by mouth daily      Historical Provider, MD   atorvastatin (LIPITOR) 80 mg tablet Take 1 tablet (80 mg total) by mouth daily 1/16/22   Melody Tyler PA-C   clonazePAM (KlonoPIN) 1 mg tablet TK 1/2 T PO BID PRN 8/22/19   Historical Provider, MD   clopidogrel (PLAVIX) 75 mg tablet TAKE 1 TABLET(75 MG) BY MOUTH DAILY 1/28/22   JOHNNIE Parrish sodium (COLACE) 100 mg capsule Take 1 capsule (100 mg total) by mouth 2 (two) times a day Hold for soft stools  1/16/22 2/15/22  Melody Tyler PA-C   ezetimibe (ZETIA) 10 mg tablet TAKE 1 TABLET(10 MG) BY MOUTH DAILY 9/15/21   Chris Thrasher MD   metoprolol tartrate (LOPRESSOR) 50 mg tablet Take 0 5 tablets (25 mg total) by mouth every 12 (twelve) hours 1/17/22   Melody Tyler PA-C   mirtazapine (REMERON) 45 MG tablet Take 45 mg by mouth daily at bedtime  Historical Provider, MD   omeprazole (PriLOSEC) 20 mg delayed release capsule Take 20 mg by mouth daily in the early morning      Historical Provider, MD   oxyCODONE (ROXICODONE) 5 immediate release tablet Take 1/2 to 1 tablet Q4-6 hours PRN pain  Patient not taking: Reported on 1/28/2022 1/16/22   Melody Tyler PA-C   polyethylene glycol (MIRALAX) 17 g packet Take 17 g by mouth daily Hold for soft stools  Patient not taking: Reported on 1/28/2022  1/16/22 2/15/22  Melody Tyler PA-C   potassium chloride (K-DUR,KLOR-CON) 20 mEq tablet Take 1 tablet (20 mEq total) by mouth daily for 10 days Unless otherwise directed  Patient not taking: Reported on 1/28/2022 1/16/22 1/26/22  Melody Tyler PA-C   QUEtiapine (SEROquel) 100 mg tablet Take 100 mg by mouth daily at bedtime  Historical Provider, MD   torsemide (DEMADEX) 20 mg tablet Take 1 tablet (20 mg total) by mouth daily for 10 days Unless otherwise directed  Patient not taking: Reported on 1/28/2022 1/16/22 1/26/22  Melody Tyler PA-C   verapamil (CALAN) 80 mg tablet Take 1 tablet (80 mg total) by mouth every 12 (twelve) hours 9/29/20   Chris Thrasher MD   VIIBRYD 40 MG tablet Take 40 mg by mouth daily with breakfast   1/22/18   Historical Provider, MD       Physical Exam:    HEENT/NECK:  Normocephalic  Atraumatic  No jugular venous distention      Cardiac: Regular rate and rhythm and No murmurs/rubs/gallops  Pulmonary:  Breath sounds clear bilaterally and No rales/rhonchi/wheezes  Abdomen: Non-tender, Non-distended and Normal bowel sounds  Incisions: Sternum is stable  Incision is clean, dry, and intact  Rib cage deformity noted with left sided lower ribs more protruding than right side  Mild pectus excavatum  No tenderness with palpation of left sided lower rib cage  Worsening pain with deep inspiration  Right clavicular crepitus noted with rotational movement of right arm  Extremities: Extremities warm/dry and No edema B/L  Neuro: Alert and oriented X 3  Sensation is grossly intact  No focal deficits  Skin: Warm/Dry, without rashes or lesions  Lab Results:               Invalid input(s): LABGLOM      Lab Results   Component Value Date    HGBA1C 5 3 01/07/2022     Lab Results   Component Value Date    TROPONINI <0 02 10/29/2018       Assessment: Coronary artery disease  S/P coronary artery bypass grafting x 1 (LIMA-LAD)    Plan:     Gertrude Rodarte continues to recover well following coronary artery bypass grafting (LIMA-LAD)  To date they have made progressive improvements physical rehabilitation  At this point I have cleared them to begin outpatient cardiac rehabilitation and have encouraged them to to do so  Gertrude Rodarte has also been cleared to resume driving at this point  I asked that them do so in progressive increments  I did remind them of their ongoing lifting restrictions of 25 pounds for an additional 2 months  His left sided rib pain is likely musculoskeletal in nature and will resolve with time  His right clavicular crepitus is most likely arthritic in nature  Gertrude Rodarte has already been evaluated by their primary care physician cardiologist for ongoing medical care  At this point I have not scheduled them for routine followup care with our office  I have advised them to call with any new concerns that may arise  Corry Calix was comfortable with our recommendations and their questions were answered to their satisfaction      The patient was referred to gastroenterology for consideration of routine colonoscopy screening of all patients aged 54-65  Gastroenterology evaluation will not be necessary prior to any open heart procedures, and can be completed following surgical recovery  [place], [place]  02/11/22    * This note was completed in part utilizing m-modal fluency direct voice recognition software  Grammatical errors, random word insertion, spelling mistakes, and incomplete sentences may be an occasional consequence of the system secondary to software limitations, ambient noise and hardware issues  At the time of dictation, efforts were made to edit, clarify and /or correct errors  Please read the chart carefully and recognize, using context, where substitutions have occurred  If you have any questions or concerns about the context, text or information contained within the body of this dictation, please contact myself, the provider, for further clarification

## 2022-02-27 PROBLEM — I47.20 VENTRICULAR TACHYCARDIA: Status: RESOLVED | Noted: 2022-01-07 | Resolved: 2022-02-27

## 2022-02-27 PROBLEM — I47.2 VENTRICULAR TACHYCARDIA (HCC): Status: RESOLVED | Noted: 2022-01-07 | Resolved: 2022-02-27

## 2022-02-28 ENCOUNTER — OFFICE VISIT (OUTPATIENT)
Dept: CARDIOLOGY CLINIC | Facility: CLINIC | Age: 57
End: 2022-02-28
Payer: MEDICARE

## 2022-02-28 VITALS
SYSTOLIC BLOOD PRESSURE: 120 MMHG | BODY MASS INDEX: 22.15 KG/M2 | WEIGHT: 154.4 LBS | HEART RATE: 88 BPM | DIASTOLIC BLOOD PRESSURE: 70 MMHG

## 2022-02-28 DIAGNOSIS — I25.10 CORONARY ARTERY DISEASE INVOLVING NATIVE CORONARY ARTERY OF NATIVE HEART WITHOUT ANGINA PECTORIS: Primary | ICD-10-CM

## 2022-02-28 DIAGNOSIS — Z95.1 S/P CABG X 1: ICD-10-CM

## 2022-02-28 DIAGNOSIS — I10 PRIMARY HYPERTENSION: ICD-10-CM

## 2022-02-28 DIAGNOSIS — E78.2 MIXED HYPERLIPIDEMIA: ICD-10-CM

## 2022-02-28 DIAGNOSIS — I25.2 OLD MYOCARDIAL INFARCT: ICD-10-CM

## 2022-02-28 PROCEDURE — 99214 OFFICE O/P EST MOD 30 MIN: CPT | Performed by: INTERNAL MEDICINE

## 2022-02-28 RX ORDER — TRAMADOL HYDROCHLORIDE 50 MG/1
50 TABLET ORAL AS NEEDED
COMMUNITY

## 2022-02-28 RX ORDER — GABAPENTIN 600 MG/1
600 TABLET ORAL 3 TIMES DAILY
COMMUNITY

## 2022-02-28 RX ORDER — ATORVASTATIN CALCIUM 80 MG/1
80 TABLET, FILM COATED ORAL DAILY
Qty: 90 TABLET | Refills: 3 | Status: SHIPPED | OUTPATIENT
Start: 2022-02-28

## 2022-02-28 RX ORDER — METHOCARBAMOL 750 MG/1
750 TABLET, FILM COATED ORAL EVERY 6 HOURS PRN
COMMUNITY

## 2022-02-28 NOTE — PROGRESS NOTES
Cardiology Follow Up    [facility], [facility] Ginnykitheodore  1965  [id number]  [address] [place] [address] [phone]    1  Coronary artery disease involving native coronary artery of native heart without angina pectoris     2  Old myocardial infarct     3  Primary hypertension     4  Mixed hyperlipidemia         Interval History:   The patient was admitted with UAP/NTMI in December  Michael Cardona He did have a stent to the LAD  Michael Cardona and was found to have a more proximal lesion and ultimately had CABG (L to LAD) in mid January     Patient felt fairly well after his surgery but over the past 3 weeks has had some increased dyspnea on exertion  He does have a sharp inframammary pain with inspiration and feels is somewhat worse with laying down  He has had no fever  He does feel dyspnea on exertion  Has been somewhat better over the past 3 days  He denies palpitations, edema or dizziness      Patient Active Problem List   Diagnosis    Primary hypertension    Old myocardial infarct    Anxiety with depression    Coronary artery disease involving native coronary artery of native heart without angina pectoris    Anxiety    Chest pain with high risk for cardiac etiology    Mixed hyperlipidemia    Adenopathy    Episodic cluster headache, not intractable    Chronic pain syndrome    NSTEMI (non-ST elevated myocardial infarction) ([address] )    S/P CABG x 1    Leukocytosis     Past Medical History:   Diagnosis Date    Angina pectoris ([address] )     Anxiety     Arthritis     Back pain     CAD (coronary artery disease)     DDD (degenerative disc disease), lumbosacral     Depressed     Full dentures     GERD (gastroesophageal reflux disease)     Hyperlipidemia     Hypertension     MI (myocardial infarction) ([address] )     2009    Wears glasses      Social History     Socioeconomic History    Marital status: /Civil Union     Spouse name: Not on file    Number of children: Not on file    Years of education: Not on file    Highest education level: Not on file   Occupational History    Not on file   Tobacco Use    Smoking status: Former Smoker     Quit date: 2008     Years since quittin 2    Smokeless tobacco: Never Used   Vaping Use    Vaping Use: Never used   Substance and Sexual Activity    Alcohol use: Never     Alcohol/week: 0 0 standard drinks    Drug use: No    Sexual activity: Not on file   Other Topics Concern    Not on file   Social History Narrative    Not on file     Social Determinants of Health     Financial Resource Strain: Not on file   Food Insecurity: No Food Insecurity    Worried About Running Out of Food in the Last Year: Never true    Vinnie of Food in the Last Year: Never true   Transportation Needs: No Transportation Needs    Lack of Transportation (Medical): No    Lack of Transportation (Non-Medical): No   Physical Activity: Not on file   Stress: Not on file   Social Connections: Not on file   Intimate Partner Violence: Not on file   Housing Stability: Low Risk     Unable to Pay for Housing in the Last Year: No    Number of Places Lived in the Last Year: 1    Unstable Housing in the Last Year: No      Family History   Problem Relation Age of Onset    Heart attack Father     Heart disease Brother      Past Surgical History:   Procedure Laterality Date    ANGIOPLASTY       LAD    CARDIAC CATHETERIZATION      CARDIAC CATHETERIZATION N/A 2021    Procedure: Cardiac catheterization;  Surgeon: Soco Jones MD;  [facility]; Service: Cardiology    CARDIAC CATHETERIZATION N/A 2021    Procedure: Cardiac Coronary Angiogram;  Surgeon: Soco Jones MD;  [facility]; Service: Cardiology    CARDIAC CATHETERIZATION N/A 2021    Procedure: Cardiac pci;  Surgeon: Soco Jones MD;  [facility];   Service: Cardiology    NASAL SINUS SURGERY      [place] CABG, ARTERY-VEIN, SINGLE N/A 1/13/2022    Procedure: CORONARY ARTERY BYPASS GRAFT (CABG) x1- LIMA to LAD;  Surgeon: Vickie Page MD;  [facility];  Service: Cardiac Surgery    VT ECHO TRANSESOPHAG [address] N/A 1/13/2022    Procedure: TRANSESOPHAGEAL ECHOCARDIOGRAM (ELADIA); Surgeon: Vickie Page MD;  [facility];  Service: Cardiac Surgery    VT REMOVE ARMPIT LYMPH NODES [address] Right 11/21/2018    Procedure: EXCISION BIOPSY LYMPH NODE AXILLARY RIGHT;  Surgeon: Aurelio Arellano MD;  [facility];  Service: General       Current Outpatient Medications:     acetaminophen (TYLENOL) 500 mg tablet, Take 500 mg by mouth every 6 (six) hours as needed for mild pain For the pain around eyes , Disp: , Rfl:     aspirin 81 MG tablet, Take 81 mg by mouth daily  , Disp: , Rfl:     atorvastatin (LIPITOR) 80 mg tablet, Take 1 tablet (80 mg total) by mouth daily, Disp: 30 tablet, Rfl: 2    clonazePAM (KlonoPIN) 1 mg tablet, TK 1/2 T PO BID PRN, Disp: , Rfl: 1    clopidogrel (PLAVIX) 75 mg tablet, TAKE 1 TABLET(75 MG) BY MOUTH DAILY, Disp: 90 tablet, Rfl: 3    docusate sodium (COLACE) 100 mg capsule, Take 1 capsule (100 mg total) by mouth 2 (two) times a day Hold for soft stools  (Patient not taking: Reported on 2/11/2022 ), Disp: 60 capsule, Rfl: 0    ezetimibe (ZETIA) 10 mg tablet, TAKE 1 TABLET(10 MG) BY MOUTH DAILY, Disp: 90 tablet, Rfl: 3    gabapentin (NEURONTIN) 600 MG tablet, Take 600 mg by mouth 3 (three) times a day, Disp: , Rfl:     methocarbamol (ROBAXIN) 750 mg tablet, Take 750 mg by mouth every 6 (six) hours as needed for muscle spasms, Disp: , Rfl:     metoprolol tartrate (LOPRESSOR) 50 mg tablet, Take 0 5 tablets (25 mg total) by mouth every 12 (twelve) hours, Disp: 60 tablet, Rfl: 2    mirtazapine (REMERON) 45 MG tablet, Take 45 mg by mouth daily at bedtime  , Disp: , Rfl:     omeprazole (PriLOSEC) 20 mg delayed release capsule, Take 20 mg by mouth daily in the early morning  , Disp: , Rfl:     oxyCODONE (ROXICODONE) 5 immediate release tablet, Take 1/2 to 1 tablet Q4-6 hours PRN pain  (Patient not taking: Reported on 1/28/2022 ), Disp: 30 tablet, Rfl: 0    polyethylene glycol (MIRALAX) 17 g packet, Take 17 g by mouth daily Hold for soft stools  (Patient not taking: Reported on 1/28/2022 ), Disp: 30 each, Rfl: 0    potassium chloride (K-DUR,KLOR-CON) 20 mEq tablet, Take 1 tablet (20 mEq total) by mouth daily for 10 days Unless otherwise directed  (Patient not taking: Reported on 1/28/2022 ), Disp: 10 tablet, Rfl: 1    QUEtiapine (SEROquel) 100 mg tablet, Take 100 mg by mouth daily at bedtime  , Disp: , Rfl:     torsemide (DEMADEX) 20 mg tablet, Take 1 tablet (20 mg total) by mouth daily for 10 days Unless otherwise directed  (Patient not taking: Reported on 1/28/2022 ), Disp: 10 tablet, Rfl: 1    traMADol (ULTRAM) 50 mg tablet, Take 50 mg by mouth, Disp: , Rfl:     verapamil (CALAN) 80 mg tablet, Take 1 tablet (80 mg total) by mouth every 12 (twelve) hours, Disp: 180 tablet, Rfl: 1    VIIBRYD 40 MG tablet, Take 40 mg by mouth daily with breakfast  , Disp: , Rfl:   No Known Allergies      Review of Systems:  Review of Systems   Constitutional: Positive for fatigue  Negative for activity change, appetite change and unexpected weight change  Respiratory: Positive for shortness of breath  Negative for cough, chest tightness and wheezing  Cardiovascular: Positive for chest pain  Negative for palpitations and leg swelling  Gastrointestinal: Negative for abdominal pain, blood in stool, constipation and diarrhea  Genitourinary: Negative for dysuria, frequency, hematuria and urgency  Musculoskeletal: Positive for arthralgias and back pain  Negative for gait problem and joint swelling  Neurological: Positive for numbness (right hand)  Negative for dizziness, speech difficulty, light-headedness and headaches  Psychiatric/Behavioral: Negative for agitation, behavioral problems, confusion and decreased concentration  The patient is nervous/anxious  Physical Exam:  120/70  88  Wt 154    Physical Exam  Constitutional:       General: He is not in acute distress  Appearance: He is normal weight  He is not ill-appearing  HENT:      Head: Normocephalic and atraumatic  Mouth/Throat:      Mouth: Mucous membranes are moist       Pharynx: No oropharyngeal exudate or posterior oropharyngeal erythema  Eyes:      General: No scleral icterus  Conjunctiva/sclera: Conjunctivae normal    Cardiovascular:      Rate and Rhythm: Normal rate and regular rhythm  Pulses: Normal pulses  Heart sounds: No murmur heard  No friction rub  No gallop  Pulmonary:      Breath sounds: Normal breath sounds  No wheezing, rhonchi or rales  Abdominal:      Palpations: Abdomen is soft  There is no mass  Tenderness: There is no abdominal tenderness  Musculoskeletal:         General: Deformity (kyphosis) present  No swelling  Skin:     General: Skin is warm  Coloration: Skin is not jaundiced or pale  Findings: No bruising, erythema, lesion or rash  Neurological:      General: No focal deficit present  Mental Status: He is oriented to person, place, and time  Cranial Nerves: No cranial nerve deficit  Sensory: No sensory deficit  Motor: No weakness  Psychiatric:         Mood and Affect: Mood normal          Behavior: Behavior normal          Thought Content: Thought content normal          Judgment: Judgment normal          Discussion/Summary:  1  CAD status post stenting in 2009 of the left anterior descending artery with recent unstable angina pectoris/nontransmural myocardial infarction  Patient had stenting of the LAD and discovered to have a more proximal LAD lesion    He underwent coronary artery bypass grafting with left internal mammary artery to left anterior descending artery graft in mid January  He was doing well after this but started to notice some exertional dyspnea and some sharp inframammary pain with a deep breath and perhaps somewhat worse lying down  I wonder if he did have some self-limited pericarditis since now he is starting to feel better  He will be maintained on dual antiplatelet therapy for now  We will continue low-dose metoprolol 25 mg b i d  2  Old  myocardial infarction  Ejection fraction low normal with anterior apical wall motion abnormality  Will maintain on beta-blocker for now  3  Hypertension  Well controlled on metoprolol and verapamil 80 mg b i d   4  Hyperlipidemia  Patient with favorable statins prior to surgery on atorvastatin 40 mg daily and ezetimibe 10 mg daily    Atorvastatin was increased to 80 mg daily for additional lowering       FU 3 months      Ivette Lee MD

## 2022-02-28 NOTE — PROGRESS NOTES
Cardiology Follow Up    [facility], [facility] Fifi  1965  [id number]  [address] [facility] [id number] [phone]    Reason for visit:  Patient here for CAD status post remote stent of the LAD in 2009 with stenting of the left anterior descending artery after nontransmural myocardial infarction in December with discoverable proximal disease follow with CABG with left internal mammary artery to left anterior descending artery graft in January  He also has known old anterior wall myocardial infarction with preserved ejection fraction hypertension and hyperlipidemia      1  Coronary artery disease involving native coronary artery of native heart without angina pectoris     2  Old myocardial infarct     3  Primary hypertension     4  Mixed hyperlipidemia         Interval History:  The patient had a nontransmural myocardial infarction in December with above interventions as noted  He did well post operatively however over the past few weeks he had developed some exertional dyspnea with left inframammary sharp pain  This was somewhat worse with recumbency  This has gotten better over the past few days  There was no fever or chills    He denies palpitations, edema or lightheadedness    Patient Active Problem List   Diagnosis    Primary hypertension    Old myocardial infarct    Anxiety with depression    Coronary artery disease involving native coronary artery of native heart without angina pectoris    Anxiety    Chest pain with high risk for cardiac etiology    Mixed hyperlipidemia    Adenopathy    Episodic cluster headache, not intractable    Chronic pain syndrome    NSTEMI (non-ST elevated myocardial infarction) ([address] )    S/P CABG x 1    Leukocytosis     Past Medical History:   Diagnosis Date    Angina pectoris ([address] )     Anxiety     Arthritis     Back pain     CAD (coronary artery disease)     DDD (degenerative disc disease), lumbosacral     Depressed     Full dentures     GERD (gastroesophageal reflux disease)     Hyperlipidemia     Hypertension     [facility] ([facility]) [facility]     2009    Wears glasses      Social History     Socioeconomic History    Marital status: /Civil Union     Spouse name: Not on file    Number of children: Not on file    Years of education: Not on file    Highest education level: Not on file   Occupational History    Not on file   Tobacco Use    Smoking status: Former Smoker     Quit date: 2008     Years since quittin 2    Smokeless tobacco: Never Used   Vaping Use    Vaping Use: Never used   Substance and Sexual Activity    Alcohol use: Never     Alcohol/week: 0 0 standard drinks    Drug use: No    Sexual activity: Not on file   Other Topics Concern    Not on file   Social History Narrative    Not on file     Social Determinants of Health     Financial Resource Strain: Not on file   Food Insecurity: No Food Insecurity    Worried About Running Out of Food in the Last Year: Never true    Vinnie of Food in the Last Year: Never true   Transportation Needs: No Transportation Needs    Lack of Transportation (Medical): No    Lack of Transportation (Non-Medical): No   Physical Activity: Not on file   Stress: Not on file   Social Connections: Not on file   Intimate Partner Violence: Not on file   Housing Stability: Low Risk     Unable to Pay for Housing in the Last Year: No    Number of Places Lived in the Last Year: 1    Unstable Housing in the Last Year: No      Family History   Problem Relation Age of Onset    Heart attack Father     Heart disease Brother      Past Surgical History:   Procedure Laterality Date    ANGIOPLASTY       LAD    CARDIAC CATHETERIZATION      CARDIAC CATHETERIZATION N/A 2021    Procedure: Cardiac catheterization;  Surgeon: Roxana Morel MD;  [facility];   Service: Cardiology    CARDIAC CATHETERIZATION N/A 12/27/2021    Procedure: Cardiac Coronary Angiogram;  Surgeon: Soco Jones MD;  [facility]; Service: Cardiology    CARDIAC CATHETERIZATION N/A 12/27/2021    Procedure: Cardiac pci;  Surgeon: Soco Jones MD;  [facility]; Service: Cardiology    NASAL SINUS SURGERY      [place] CABG, ARTERY-VEIN, SINGLE N/A 1/13/2022    Procedure: CORONARY ARTERY BYPASS GRAFT (CABG) x1- LIMA to LAD;  Surgeon: Allyssa Fairbanks MD;  [facility];  Service: Cardiac Surgery    [place] ECHO TRANSESOPHAG [address] N/A 1/13/2022    Procedure: TRANSESOPHAGEAL ECHOCARDIOGRAM (ELADIA); Surgeon: Allyssa Fairbanks MD;  [facility];  Service: Cardiac Surgery    WI REMOVE ARMPIT LYMPH NODES [address] Right 11/21/2018    Procedure: EXCISION BIOPSY LYMPH NODE AXILLARY RIGHT;  Surgeon: Brenda Escobar MD;  [facility];  Service: General       Current Outpatient Medications:     acetaminophen (TYLENOL) 500 mg tablet, Take 500 mg by mouth every 6 (six) hours as needed for mild pain For the pain around eyes , Disp: , Rfl:     aspirin 81 MG tablet, Take 81 mg by mouth daily  , Disp: , Rfl:     clonazePAM (KlonoPIN) 1 mg tablet, TK 1/2 T PO BID PRN, Disp: , Rfl: 1    clopidogrel (PLAVIX) 75 mg tablet, TAKE 1 TABLET(75 MG) BY MOUTH DAILY, Disp: 90 tablet, Rfl: 3    ezetimibe (ZETIA) 10 mg tablet, TAKE 1 TABLET(10 MG) BY MOUTH DAILY, Disp: 90 tablet, Rfl: 3    gabapentin (NEURONTIN) 600 MG tablet, Take 600 mg by mouth 3 (three) times a day, Disp: , Rfl:     methocarbamol (ROBAXIN) 750 mg tablet, Take 750 mg by mouth every 6 (six) hours as needed for muscle spasms, Disp: , Rfl:     mirtazapine (REMERON) 45 MG tablet, Take 45 mg by mouth daily at bedtime  , Disp: , Rfl:     omeprazole (PriLOSEC) 20 mg delayed release capsule, Take 20 mg by mouth daily in the early morning  , Disp: , Rfl:     QUEtiapine (SEROquel) 100 mg tablet, Take 100 mg by mouth daily at bedtime  , Disp: , Rfl:     traMADol (ULTRAM) 50 mg tablet, Take 50 mg by mouth, Disp: , Rfl:     verapamil (CALAN) 80 mg tablet, Take 1 tablet (80 mg total) by mouth every 12 (twelve) hours, Disp: 180 tablet, Rfl: 1    VIIBRYD 40 MG tablet, Take 40 mg by mouth daily with breakfast  , Disp: , Rfl:     atorvastatin (LIPITOR) 80 mg tablet, Take 1 tablet (80 mg total) by mouth daily, Disp: 90 tablet, Rfl: 3    docusate sodium (COLACE) 100 mg capsule, Take 1 capsule (100 mg total) by mouth 2 (two) times a day Hold for soft stools  (Patient not taking: Reported on 2/11/2022 ), Disp: 60 capsule, Rfl: 0    metoprolol tartrate (LOPRESSOR) 25 mg tablet, Take 1 tablet (25 mg total) by mouth every 12 (twelve) hours, Disp: 180 tablet, Rfl: 3    oxyCODONE (ROXICODONE) 5 immediate release tablet, Take 1/2 to 1 tablet Q4-6 hours PRN pain  (Patient not taking: Reported on 1/28/2022 ), Disp: 30 tablet, Rfl: 0    polyethylene glycol (MIRALAX) 17 g packet, Take 17 g by mouth daily Hold for soft stools  (Patient not taking: Reported on 1/28/2022 ), Disp: 30 each, Rfl: 0    potassium chloride (K-DUR,KLOR-CON) 20 mEq tablet, Take 1 tablet (20 mEq total) by mouth daily for 10 days Unless otherwise directed  (Patient not taking: Reported on 1/28/2022 ), Disp: 10 tablet, Rfl: 1    torsemide (DEMADEX) 20 mg tablet, Take 1 tablet (20 mg total) by mouth daily for 10 days Unless otherwise directed  (Patient not taking: Reported on 1/28/2022 ), Disp: 10 tablet, Rfl: 1  No Known Allergies    Review of Systems:  Review of Systems   Constitutional: Positive for fatigue  Negative for activity change, appetite change and unexpected weight change  Respiratory: Positive for shortness of breath  Negative for cough, chest tightness and wheezing  Cardiovascular: Positive for chest pain  Negative for palpitations and leg swelling     Gastrointestinal: Negative for abdominal pain, blood in stool, constipation and diarrhea  Genitourinary: Negative for dysuria, frequency, hematuria and urgency  Musculoskeletal: Positive for arthralgias and back pain  Negative for gait problem and joint swelling  Neurological: Negative for dizziness, syncope, speech difficulty, light-headedness and headaches  Psychiatric/Behavioral: Negative for agitation, behavioral problems, confusion and decreased concentration  Physical Exam   120/70  Vitals:    02/28/22 1631   BP: 120/70   BP Location: Right arm   Patient Position: Sitting   Cuff Size: Adult   Pulse: 88   Weight: 70 kg (154 lb 6 4 oz)       Physical Exam  Constitutional:       Appearance: Normal appearance  He is normal weight  He is not ill-appearing  HENT:      Head: Normocephalic and atraumatic  Mouth/Throat:      Mouth: Mucous membranes are moist       Pharynx: No oropharyngeal exudate or posterior oropharyngeal erythema  Eyes:      General: No scleral icterus  Conjunctiva/sclera: Conjunctivae normal    Neck:      Thyroid: No thyroid mass or thyromegaly  Vascular: Normal carotid pulses  No carotid bruit or JVD  Cardiovascular:      Rate and Rhythm: Normal rate and regular rhythm  Pulses: Normal pulses  Heart sounds: No murmur heard  No friction rub  No gallop  Pulmonary:      Breath sounds: Normal breath sounds  No wheezing, rhonchi or rales  Abdominal:      Palpations: Abdomen is soft  There is no hepatomegaly, splenomegaly or mass  Tenderness: There is no abdominal tenderness  Musculoskeletal:         General: Deformity (kyphosis) present  No swelling  Cervical back: Neck supple  Skin:     General: Skin is warm  Coloration: Skin is not jaundiced or pale  Findings: No bruising, erythema, lesion or rash  Neurological:      General: No focal deficit present  Mental Status: He is oriented to person, place, and time  Cranial Nerves: No cranial nerve deficit  Sensory: No sensory deficit        Motor: No weakness  Psychiatric:         Mood and Affect: Mood normal          Behavior: Behavior normal          Thought Content: Thought content normal          Judgment: Judgment normal          Discussion/Summary:  1  CAD status post remote stenting with more recent nontransmural myocardial infarction with stenting and then discoverable proximal LAD disease  Subsequent CABG of the LIMA to LAD in mid January  Patient did well postoperatively however started developing some exertional dyspnea with left inframammary pain with exertion  I wonder if he did have some pericarditis which has self-resolved  He is to call me if he develops recurrent discomfort  He will continue dual antiplatelet therapy and low-dose metoprolol 25 mg b i d   2  Prior anterior apical myocardial infarction with preserved ejection fraction continue metoprolol at the above-noted dosage  3  Hypertension  Well controlled on metoprolol 25 mg b i d  And verapamil 80 mg b i d   4  Hyperlipidemia  Patient on atorvastatin 80 mg daily and ezetimibe 10 mg daily    LDL cholesterol pre procedure was 63 will continue higher dose of atorvastatin    Follow-up 3 months      Salma Harden MD

## 2022-03-04 ENCOUNTER — CLINICAL SUPPORT (OUTPATIENT)
Dept: CARDIAC REHAB | Facility: CLINIC | Age: 57
End: 2022-03-04
Payer: MEDICARE

## 2022-03-04 DIAGNOSIS — Z95.1 S/P CABG X 1: ICD-10-CM

## 2022-03-04 DIAGNOSIS — I21.4 NSTEMI (NON-ST ELEVATED MYOCARDIAL INFARCTION) (HCC): ICD-10-CM

## 2022-03-04 PROCEDURE — 93797 PHYS/QHP OP CAR RHAB WO ECG: CPT

## 2022-03-04 NOTE — PROGRESS NOTES
CARDIAC REHAB ASSESSMENT    Today's date: 2022  Patient name: Sunil Marquis     : 1965       MRN: [id number]  PCP: Marge Adam DO  Referring Physician: Shantel Arenas PA-C  Cardiologist: Dr Carlos Manuel Prabhakar  Surgeon: Dr Luis Enrique Pearson  Dx:   Encounter Diagnoses   Name Primary?  NSTEMI (non-ST elevated myocardial infarction) ([facility] )     S/P CABG x 1        Date of onset: 21  Cultural needs: None    Weight    Wt Readings from Last 1 Encounters:   22 70 kg (154 lb 6 4 oz)      Height:   Ht Readings from Last 1 Encounters:   22 5' 10" (1 778 m)     Medical History:   Past Medical History:   Diagnosis Date    Angina pectoris ([facility] )     Anxiety     Arthritis     Back pain     CAD (coronary artery disease)     DDD (degenerative disc disease), lumbosacral     Depressed     Full dentures     GERD (gastroesophageal reflux disease)     Hyperlipidemia     Hypertension     MI (myocardial infarction) ([facility] )     2009    Wears glasses          Physical Limitations:  Chronic upper back pain    Fall Risk: Low   Comments: Ambulates with a steady gait with no assist device    Anginal Equivalent: None/denies angina   NTG use: No prescription    Risk Factors   Cholesterol: Yes  Smoking: Former user - quit  after first MI (1 5 ppd for 30 yrs)  HTN: Yes  DM: No  Obesity: No   Inactivity: Yes  Stress:  perceived  stress: 10- reports significantly less stress than when he had his first MI     Family History:  Family History   Problem Relation Age of Onset    Heart attack Father     Heart disease Brother        Allergies: Patient has no known allergies    ETOH:   Social History     Substance and Sexual Activity   Alcohol Use Never    Alcohol/week: 0 0 standard drinks         Current Medications:   Current Outpatient Medications   Medication Sig Dispense Refill    acetaminophen (TYLENOL) 500 mg tablet Take 500 mg by mouth every 6 (six) hours as needed for mild pain For the pain around eyes       aspirin 81 MG tablet Take 81 mg by mouth daily   atorvastatin (LIPITOR) 80 mg tablet Take 1 tablet (80 mg total) by mouth daily 90 tablet 3    clonazePAM (KlonoPIN) 1 mg tablet TK 1/2 T PO BID PRN  1    clopidogrel (PLAVIX) 75 mg tablet TAKE 1 TABLET(75 MG) BY MOUTH DAILY 90 tablet 3    docusate sodium (COLACE) 100 mg capsule Take 1 capsule (100 mg total) by mouth 2 (two) times a day Hold for soft stools  (Patient not taking: Reported on 2/11/2022 ) 60 capsule 0    ezetimibe (ZETIA) 10 mg tablet TAKE 1 TABLET(10 MG) BY MOUTH DAILY 90 tablet 3    gabapentin (NEURONTIN) 600 MG tablet Take 600 mg by mouth 3 (three) times a day      methocarbamol (ROBAXIN) 750 mg tablet Take 750 mg by mouth every 6 (six) hours as needed for muscle spasms      metoprolol tartrate (LOPRESSOR) 25 mg tablet Take 1 tablet (25 mg total) by mouth every 12 (twelve) hours 180 tablet 3    mirtazapine (REMERON) 45 MG tablet Take 45 mg by mouth daily at bedtime   omeprazole (PriLOSEC) 20 mg delayed release capsule Take 20 mg by mouth daily in the early morning        oxyCODONE (ROXICODONE) 5 immediate release tablet Take 1/2 to 1 tablet Q4-6 hours PRN pain  (Patient not taking: Reported on 1/28/2022 ) 30 tablet 0    polyethylene glycol (MIRALAX) 17 g packet Take 17 g by mouth daily Hold for soft stools  (Patient not taking: Reported on 1/28/2022 ) 30 each 0    potassium chloride (K-DUR,KLOR-CON) 20 mEq tablet Take 1 tablet (20 mEq total) by mouth daily for 10 days Unless otherwise directed  (Patient not taking: Reported on 1/28/2022 ) 10 tablet 1    QUEtiapine (SEROquel) 100 mg tablet Take 100 mg by mouth daily at bedtime   torsemide (DEMADEX) 20 mg tablet Take 1 tablet (20 mg total) by mouth daily for 10 days Unless otherwise directed   (Patient not taking: Reported on 1/28/2022 ) 10 tablet 1    traMADol (ULTRAM) 50 mg tablet Take 50 mg by mouth      verapamil (CALAN) 80 mg tablet Take 1 tablet (80 mg total) by mouth every 12 (twelve) hours 180 tablet 1    VIIBRYD 40 MG tablet Take 40 mg by mouth daily with breakfast         No current facility-administered medications for this visit  Functional Status Prior to Diagnosis for Treatment   Occupation: unemployed  Recreation: coin collecting  ADLs: No limitations  Brule: No limitations  Exercise: None    Current Functional Status  Occupation: unemployed  Recreation: coin collecting  ADLs:resumed driving; does not complete ADLs at home  Brule: able to perform self-care resumed driving does not complete ADLs at home  Exercise: None    Patient Specific Goals:  Start exercise regimen    Short Term Program Goals: dietary modifications increased strength improved energy/stamina with ADLs exercise 120-150 mins/wk    Long Term Goals: Improved [facility] Activity Status score  Improved functional capacity  Improved Quality of Life - [facility] score reduced  Improved lipid profile    Ability to reach goals/rehabilitation potential:  Good    Projected return to function: 12 weeks  Objective tests: sub-max TM ETT      Nutritional   Reviewed details of Rate your Plate  Discussed key elements of heart healthy eating  Reviewed patient goals for dietary modifications and their clinical implications  Reviewed most recent lipid profile       Goals for dietary modification: choose lean cuts of meat  more meatless meals  increase whole grains  increase fruits and vegetables  eliminate butter  low sodium  more nuts/seeds      Emotional/Social  Patient has a history of depression  Patient has a history of anxiety   Patient reports feelings of depression   Patient reports feelings of anxiety  Reports sufficient emotional support  compliant with medical therapy for depression/anxiety    Marital status:     Domestic Violence Screening: No

## 2022-03-04 NOTE — PROGRESS NOTES
Cardiac Rehabilitation Plan of Care   Initial Care Plan        Today's date: 3/4/2022   # of Exercise Sessions Completed: initial evaluation  Patient name: Alexus Weir      : 1965  Age: 60 y o  MRN: [id number]  Referring Physician: Brittny Rao PA-C  Cardiologist: Dr Liang Sena  Provider: [facility]  Clinician: Ana Acevedo MS, CEP    Dx:   Encounter Diagnoses   Name Primary?  NSTEMI (non-ST elevated myocardial infarction) ([address] )     S/P CABG x 1      Date of onset: 2022      SUMMARY OF PROGRESS: Today is Corry's initial evaluation to begin Cardiac Rehab now 7 wks post CABG x1 on 22  On 21, Frankie Bob was admitted with NSTEMI/stent and discovery of additional stenosis of LAD that required CABG  Corry reports left sided chest/shoulder discomfort signally his MI in December that differs from the stomach discomfort he felt with his MI in   He continues to c/o sharp pain at his lower left ribs that did not change with exercise today  Corry has additional history of HTN, HLD, anxiety, depression, and arthritis  He does note chronic upper back pain that may limit exercise  He  does not currently follow a formal exercise program at home and his wife takes care of all ADLs at home  Today, Corry completed an initial submaximal TM ETT  He completed 3 minutes of stage 3 (4 3METs) with test termination of RPE 6 and RHR +30  Resting  BP of 120/74 with appropriate hemodynamic response to exercise; BP reaching 138/74 - 158/72 and peak HR of 92 bpm  Corry reported 2/10 sharp left side pain that has been consistent since surgery but did not worsen with exercise today  Telemetry revealed NSR  Corry was counseled on exercise guidelines and the goal to achieve a minimum of 150 mins/wk of moderate intensity (RPE 4-6) exercise per AHA guidelines    Depression and anxiety were assessed with PHQ-9 and ROSALIE-7 questionnaires with scores of 5 and 5 respectively, suggesting  5-9 = Mild Depression and  5-9 = Mild anxiety  When addressed, Ada Stewart admits to having mild depression/anxiety symptoms and some stress but nothing like he had after his initial MI in 2009  He reports to be mostly frustrated that he quit smoking and made dietary changes after his 20009 MI but this still happened, reporting "it was all for nothing"  Information to begin using [employer] was provided as well as contact information for counseling through [facility]  PHQ-9 score will be reassessed in 30 days  He does follow up with his psychiatrist every 6 months and reports compliance with medical therapy  Corry will attend group education classes on heart healthy eating, reading food labels, stress management, risk factor reduction, understanding heart disease and common heart medications  He will attend 35 monitored exercise sessions, 3x/wk for 12-18 weeks beginning Wednesday, March 9       frstrated because he quit smoking and followed cardiac deit    Medication compliance: Yes   Comments: Pt reports to be compliant with medications  Fall Risk: Low   Comments: Ambulates with a steady gait with no assist device    EKG Interpretation: NSR      EXERCISE ASSESSMENT and PLAN    Current Exercise Program in Rehab:       Frequency: 3 days/week       Minutes: 30-35         METS: 3 5-4 5            HR:  bpm   RPE: 3-5         Modalities: Treadmill, UBE, Lifecycle and NuStep      Exercise Progression 30 Day Goals :    Frequency: 3 days/week of cardiac rehab     Supplement with home exercise 2+ days/wk as tolerated    Minutes: 30-40                            >150 mins/wk of moderate intensity exercise   METS: 4-5 0   HR:  bpm    RPE: 4-6   Modalities: Treadmill, UBE, Lifecycle and Elliptical    Strength training:   Will be added following at least 8 weeks post surgery and 8-10 monitored sessions   Modalities: Leg Press, Chest Press, Pull Downs, Lateral Raise, Arm Extension, Arm Curl, Upright Rows and Front Raises    Home Exercise: none    Goals: 10% improvement in functional capacity - based on max METs achieved in fitness assessment, improved DASI score by 10%, Increase in exercise capacity by 40% - based on peak METs tolerated in cardiac rehab exercise session, Exercise 5 days/wk, >150mins/wk of moderate intensity exercise, Attend Rehab regularly and start exercise program    Progression Toward Goals:  Reviewed Pt goals and determined plan of care    Education: benefit of exercise for CAD risk factors, home exercise guidelines and RPE scale   Plan:Education class: Risk Factors for Heart Disease  Readiness to change: Preparation:  (Getting ready to change)       NUTRITION ASSESSMENT AND PLAN    Weight control:    Starting weight: 156 2 lb   Current weight:     Waist circumference:    Startin 25 in   Current:      Diabetes: N/A  A1c: 5 3%    last measured: 22    Lipid management: Last lipid profile 21  Chol 125    HDL 31  LDL 63    Goals:HDL >40, TRG <150, Improved Rate Your Plate score  >23, eat 3 or more servings of whole grains a day, Eat 4-5 cups of fruits and vegetables daily, choose low sodium processed foods and Increase intake of nuts and seeds    Progression Toward Goals: Reviewed Pt goals and determined plan of care    Education: heart healthy eating  low sodium diet  hydration  nutrition for  lipid management  Plan: Education class: Reading Food Labels, Education Class: Heart Healthy Eating, replace refined grain bread with whole grain bread and replace unhealthy snacks with fruits & vegs  Readiness to change: Preparation:  (Getting ready to change)       PSYCHOSOCIAL ASSESSMENT AND PLAN    Emotional:  Depression assessment:  PHQ-9 = 5 5-9 = Mild Depression            Anxiety measure:  ROSALIE-7 = 5 5-9 = Mild anxiety  Self-reported stress level:  4 - reports less stress than with his MI in 2009  Social support: Good     Goals:  Reduce perceived stress to 1-3/10, PHQ-9 - reduced severity by one level, continue medical therapy, Social Support in [place] Score < 3, Daily Activity in [facility] Score < 3, Overall Health in [facility] Score < 3, Quality of Life in [facility] Score < 3 , Increased interest in doing things and improved positive thoughts of well being    Progression Toward Goals: Reviewed Pt goals and determined plan of care    Education: signs/sxs of depression, benefits of a positive support system and depression and CAD  Plan: Class: Stress and Your Health, Refer to [employer], Exercise and Enjoy a hobby  Readiness to change: Preparation:  (Getting ready to change)       OTHER CORE COMPONENTS     Tobacco:   Social History     Tobacco Use   Smoking Status Former Smoker    Quit date: 2008    Years since quittin 2   Smokeless Tobacco Never Used       Tobacco Use Intervention:   Pt quit    and has abstained    Anginal Symptoms:  None   NTG use: No prescription    Blood pressure:    Restin/74   Exercise: 138/76 - 158/72    Goals: consistent BP < 130/80, reduced dietary sodium <2300mg, moderate intensity exercise >150 mins/wk and medication compliance    Progression Toward Goals: Reviewed Pt goals and determined plan of care    Education:  understanding high blood pressure and it's relationship to CAD, low sodium diet and HTN and relapse education  Plan: Class: Understanding Heart Disease, Class: Common Heart Medications, engage in regular exercise and check labels for sodium content  Readiness to change: Preparation:  (Getting ready to change)

## 2022-03-09 ENCOUNTER — CLINICAL SUPPORT (OUTPATIENT)
Dept: CARDIAC REHAB | Facility: CLINIC | Age: 57
End: 2022-03-09
Payer: MEDICARE

## 2022-03-09 DIAGNOSIS — Z95.1 S/P CABG X 1: Primary | ICD-10-CM

## 2022-03-09 PROCEDURE — 93798 PHYS/QHP OP CAR RHAB W/ECG: CPT

## 2022-03-11 ENCOUNTER — CLINICAL SUPPORT (OUTPATIENT)
Dept: CARDIAC REHAB | Facility: CLINIC | Age: 57
End: 2022-03-11
Payer: MEDICARE

## 2022-03-11 DIAGNOSIS — Z95.1 S/P CABG X 1: Primary | ICD-10-CM

## 2022-03-11 PROCEDURE — 93798 PHYS/QHP OP CAR RHAB W/ECG: CPT

## 2022-03-14 ENCOUNTER — CLINICAL SUPPORT (OUTPATIENT)
Dept: CARDIAC REHAB | Facility: CLINIC | Age: 57
End: 2022-03-14
Payer: MEDICARE

## 2022-03-14 DIAGNOSIS — Z95.1 S/P CABG X 1: Primary | ICD-10-CM

## 2022-03-14 PROCEDURE — 93798 PHYS/QHP OP CAR RHAB W/ECG: CPT

## 2022-03-16 ENCOUNTER — CLINICAL SUPPORT (OUTPATIENT)
Dept: CARDIAC REHAB | Facility: CLINIC | Age: 57
End: 2022-03-16
Payer: MEDICARE

## 2022-03-16 DIAGNOSIS — Z95.1 S/P CABG X 1: Primary | ICD-10-CM

## 2022-03-16 PROCEDURE — 93798 PHYS/QHP OP CAR RHAB W/ECG: CPT

## 2022-03-18 ENCOUNTER — APPOINTMENT (OUTPATIENT)
Dept: CARDIAC REHAB | Facility: CLINIC | Age: 57
End: 2022-03-18
Payer: MEDICARE

## 2022-03-21 ENCOUNTER — CLINICAL SUPPORT (OUTPATIENT)
Dept: CARDIAC REHAB | Facility: CLINIC | Age: 57
End: 2022-03-21
Payer: MEDICARE

## 2022-03-21 DIAGNOSIS — Z95.1 S/P CABG X 1: Primary | ICD-10-CM

## 2022-03-21 PROCEDURE — 93798 PHYS/QHP OP CAR RHAB W/ECG: CPT

## 2022-03-23 ENCOUNTER — CLINICAL SUPPORT (OUTPATIENT)
Dept: CARDIAC REHAB | Facility: CLINIC | Age: 57
End: 2022-03-23
Payer: MEDICARE

## 2022-03-23 DIAGNOSIS — Z95.1 S/P CABG X 1: Primary | ICD-10-CM

## 2022-03-23 PROCEDURE — 93798 PHYS/QHP OP CAR RHAB W/ECG: CPT

## 2022-03-25 ENCOUNTER — CLINICAL SUPPORT (OUTPATIENT)
Dept: CARDIAC REHAB | Facility: CLINIC | Age: 57
End: 2022-03-25
Payer: MEDICARE

## 2022-03-25 DIAGNOSIS — Z95.1 S/P CABG X 1: Primary | ICD-10-CM

## 2022-03-25 PROCEDURE — 93798 PHYS/QHP OP CAR RHAB W/ECG: CPT

## 2022-03-28 ENCOUNTER — CLINICAL SUPPORT (OUTPATIENT)
Dept: CARDIAC REHAB | Facility: CLINIC | Age: 57
End: 2022-03-28
Payer: MEDICARE

## 2022-03-28 DIAGNOSIS — Z95.1 S/P CABG X 1: Primary | ICD-10-CM

## 2022-03-28 PROCEDURE — 93798 PHYS/QHP OP CAR RHAB W/ECG: CPT

## 2022-03-30 ENCOUNTER — CLINICAL SUPPORT (OUTPATIENT)
Dept: CARDIAC REHAB | Facility: CLINIC | Age: 57
End: 2022-03-30
Payer: MEDICARE

## 2022-03-30 DIAGNOSIS — Z95.1 S/P CABG X 1: Primary | ICD-10-CM

## 2022-03-30 PROCEDURE — 93798 PHYS/QHP OP CAR RHAB W/ECG: CPT

## 2022-03-31 NOTE — PROGRESS NOTES
Cardiac Rehabilitation Plan of Care   30 Day Reassessment        Today's date: 3/31/2022   # of Exercise Sessions Completed: 10  Patient name: Ambrose Duggan      : 1965  Age: 60 y o  MRN: [id number]  Referring Physician: Zander Bravo PA-C  Cardiologist: Dr Amara Sloan  Provider: [facility]  Clinician: Liz Massey MS, CEP    Dx:   Encounter Diagnosis   Name Primary?  S/P CABG x 1 Yes     Date of onset: 2022      SUMMARY OF PROGRESS: This is Corry's 30 day note in cardiac rehab  He tolerates 35 minutes of cardiovascular exercise at 3-3 4 METs and is limited mostly by his dizziness and foggy feeling  No significant limitation noted from his upper back pain  Corry also reports difficulty seeing on occasion  He reports that his "foggy" feeling has been consistent for many years  He has not begun a regular exercise program at home because of the way he feels but has resumed most ADLs  NSR noted on telemetry with exercise  Resting /70 - 122/70 with normal BP response to exercise reaching 122/70 - 138/74  Corry will add weight training in the next 30 days of cardiac rehab  He has not made significant dietary changes, reporting consistency with the changes he had already made prior to starting cardiac rehab  Dee Díaz continues to reports mild depression/anxiety symptoms and frustration with his health  Repeat PHQ-9 will be done in the next 30-60 days as needed  Corry has received education on label reading, stress management, and heart disease risk factors so far  Exercise duration and intensity will continue to be progressed as tolerated to maintain RPE 4-6        Medication compliance: Yes   Comments: Pt reports to be compliant with medications  Fall Risk: Low   Comments: Ambulates with a steady gait with no assist device    EKG Interpretation: NSR      EXERCISE ASSESSMENT and PLAN    Current Exercise Program in Rehab:       Frequency: 3 days/week       Minutes: 35         METS: 3 0-3 4           HR:  bpm   RPE: 3-5         Modalities: Treadmill, UBE, Lifecycle and NuStep      Exercise Progression 30 Day Goals :    Frequency: 3 days/week of cardiac rehab     Supplement with home exercise 2+ days/wk as tolerated    Minutes: 35-40                            >150 mins/wk of moderate intensity exercise   METS: 3 2-4 5   HR:  bpm    RPE: 4-6   Modalities: Treadmill, UBE, Lifecycle and Elliptical    Strength training: Will be added following at least 8 weeks post surgery and 8-10 monitored sessions   Modalities: Leg Press, Chest Press, Pull Downs, Lateral Raise, Arm Extension, Arm Curl, Upright Rows and Front Raises    Home Exercise: none    Goals: 10% improvement in functional capacity - based on max METs achieved in fitness assessment, improved DASI score by 10%, Increase in exercise capacity by 40% - based on peak METs tolerated in cardiac rehab exercise session, Exercise 5 days/wk, >150mins/wk of moderate intensity exercise, Attend Rehab regularly and start exercise program    Progression Toward Goals:  Pt is progressing and showing improvement  toward the following goals:  attending cardiac rehab regularly  , Patient will add weight training in the next 30 days, Will continue to educate and progress as tolerated      Education: benefit of exercise for CAD risk factors, home exercise guidelines, RPE scale and class: Risk Factors for Heart Disease   Plan:home exercise 30+ mins 2 days opposite CR  Readiness to change: Action:  (Changing behavior)      NUTRITION ASSESSMENT AND PLAN    Weight control:    Starting weight: 156 2 lb   Current weight:   156 2 lb  Waist circumference:    Startin 25 in   Current:      Diabetes: N/A  A1c: 5 3%    last measured: 22    Lipid management: Last lipid profile 21  Chol 125    HDL 31  LDL 63    Goals:HDL >40, TRG <150, Improved Rate Your Plate score  >57, eat 3 or more servings of whole grains a day, Eat 4-5 cups of fruits and vegetables daily, choose low sodium processed foods and Increase intake of nuts and seeds    Progression Toward Goals: Pt is progressing and showing improvement  toward the following goals:  maintaining weight and heart healthy diet   , Will continue to educate and progress as tolerated  Education: heart healthy eating  low sodium diet  hydration  nutrition for  lipid management  education class:  Label Reading  Plan: Education Class: Heart Healthy Eating, replace refined grain bread with whole grain bread and replace unhealthy snacks with fruits & vegs  Readiness to change: Action:  (Changing behavior)      PSYCHOSOCIAL ASSESSMENT AND PLAN    Emotional:  Depression assessment:  PHQ-9 = 5 5-9 = Mild Depression            Anxiety measure:  ROSALIE-7 = 5 5-9 = Mild anxiety  Self-reported stress level:  4 - reports less stress than with his MI in   Social support: Good     Goals:  Reduce perceived stress to 1-3/10, PHQ-9 - reduced severity by one level, continue medical therapy, Social Support in [facility] Score < 3, Daily Activity in [facility] Score < 3, Overall Health in [facility] Score < 3, Quality of Life in [facility] Score < 3 , Increased interest in doing things and improved positive thoughts of well being    Progression Toward Goals: Pt is progressing and showing improvement  toward the following goals:  attended stress management education  , Pt has not made progress toward the following goals: continues to report depression/anxiety symptoms  , Will continue to educate and progress as tolerated      Education: signs/sxs of depression, benefits of a positive support system, depression and CAD and class:  Stress and Your Health   Plan: Refer to [employer], Exercise and Enjoy a hobby  Readiness to change: Preparation:  (Getting ready to change)       OTHER CORE COMPONENTS     Tobacco:   Social History     Tobacco Use   Smoking Status Former Smoker    Quit date: 2008    Years since quittin 3 Smokeless Tobacco Never Used       Tobacco Use Intervention:   Pt quit    and has abstained    Anginal Symptoms:  None   NTG use: No prescription    Blood pressure:    Restin/70 - 122/70   Exercise: 122/70 - 138/74    Goals: consistent BP < 130/80, reduced dietary sodium <2300mg, moderate intensity exercise >150 mins/wk and medication compliance    Progression Toward Goals: Pt is progressing and showing improvement  toward the following goals:  normal resting BP with normal BP response to exercise   , Will continue to educate and progress as tolerated      Education:  understanding high blood pressure and it's relationship to CAD, low sodium diet and HTN and relapse education  Plan: Class: Understanding Heart Disease, Class: Common Heart Medications, engage in regular exercise and check labels for sodium content  Readiness to change: Action:  (Changing behavior)

## 2022-04-01 ENCOUNTER — CLINICAL SUPPORT (OUTPATIENT)
Dept: CARDIAC REHAB | Facility: CLINIC | Age: 57
End: 2022-04-01
Payer: MEDICARE

## 2022-04-01 DIAGNOSIS — Z95.1 S/P CABG X 1: Primary | ICD-10-CM

## 2022-04-01 PROCEDURE — 93798 PHYS/QHP OP CAR RHAB W/ECG: CPT

## 2022-04-04 ENCOUNTER — CLINICAL SUPPORT (OUTPATIENT)
Dept: CARDIAC REHAB | Facility: CLINIC | Age: 57
End: 2022-04-04
Payer: MEDICARE

## 2022-04-04 DIAGNOSIS — Z95.1 S/P CABG X 1: Primary | ICD-10-CM

## 2022-04-04 PROCEDURE — 93798 PHYS/QHP OP CAR RHAB W/ECG: CPT

## 2022-04-06 ENCOUNTER — APPOINTMENT (OUTPATIENT)
Dept: CARDIAC REHAB | Facility: CLINIC | Age: 57
End: 2022-04-06
Payer: MEDICARE

## 2022-04-08 ENCOUNTER — APPOINTMENT (OUTPATIENT)
Dept: CARDIAC REHAB | Facility: CLINIC | Age: 57
End: 2022-04-08
Payer: MEDICARE

## 2022-04-11 ENCOUNTER — APPOINTMENT (OUTPATIENT)
Dept: CARDIAC REHAB | Facility: CLINIC | Age: 57
End: 2022-04-11
Payer: MEDICARE

## 2022-04-13 ENCOUNTER — APPOINTMENT (OUTPATIENT)
Dept: CARDIAC REHAB | Facility: CLINIC | Age: 57
End: 2022-04-13
Payer: MEDICARE

## 2022-04-15 ENCOUNTER — APPOINTMENT (OUTPATIENT)
Dept: CARDIAC REHAB | Facility: CLINIC | Age: 57
End: 2022-04-15
Payer: MEDICARE

## 2022-04-20 ENCOUNTER — APPOINTMENT (OUTPATIENT)
Dept: CARDIAC REHAB | Facility: CLINIC | Age: 57
End: 2022-04-20
Payer: MEDICARE

## 2022-04-22 ENCOUNTER — APPOINTMENT (OUTPATIENT)
Dept: CARDIAC REHAB | Facility: CLINIC | Age: 57
End: 2022-04-22
Payer: MEDICARE

## 2022-04-22 NOTE — PROGRESS NOTES
Cardiac Rehabilitation Plan of Care   Discharge        Today's date: 2022   # of Exercise Sessions Completed: 12  Patient name: Rufina Musa      : 1965  Age: 60 y o  MRN: [id number]  Referring Physician: Minda Root PA-C  Cardiologist: Dr Alexandre Garza  Provider: [facility]  Clinician: Mary Padilla MS, CEP    Dx:   Encounter Diagnosis   Name Primary?  S/P CABG x 1 Yes     Date of onset: 2022      SUMMARY OF PROGRESS: Maricel Irwin has completed 2 exercise sessions in the past 3 weeks due to covid and he does not wish to continue cardiac rehab at this time  He reports increases in fatigue and depression  Corry had also been reporting dizziness and a constant 'fog' that he stated has been going on for many years that may have discouraged him from exercise  In cardiac rehab, Corry tolerates 30-35 min of cardiovascular exercise at 3 1-3 4 METs plus began weight training  He does not plan to continue with home exercise at this time  Normla resting BP with normal BP response to exercise noted  Resting HR 73-77 bpm with increase to  bpm during exercise  NSR noted on telemetry  Due to Corry's absence from cardiac rehab, including today, final fitness and questionnaire assessment was not possible  He will follow up with cardiology and his PCP as scheduled        Medication compliance: Yes   Comments: Pt reports to be compliant with medications  Fall Risk: Low   Comments: Ambulates with a steady gait with no assist device    EKG Interpretation: NSR      EXERCISE ASSESSMENT and PLAN    Current Exercise Program in Rehab:       Frequency: 3 days/week       Minutes: 30-35         METS: 3 0-3 4           HR:  bpm   RPE: 3-5         Modalities: Treadmill, UBE and Lifecycle      Exercise Progression 30 Day Goals :    Frequency: 3 days/week of cardiac rehab     Supplement with home exercise 2+ days/wk as tolerated    Minutes: 35-40                            >150 mins/wk of moderate intensity exercise   METS: 3 2-4 5   HR:  bpm    RPE: 4-6   Modalities: Treadmill, UBE, Lifecycle, Elliptical and NuStep    Strength trainin-3 days / week  12-15 repetitions  1-2 sets per modality    Modalities: Leg Press, Chest Press, Arm Extension, Arm Curl and Shoulder Shrugs    Home Exercise: none    Goals: Exercise 5 days/wk, >150mins/wk of moderate intensity exercise, Decrease sitting time and start exercise program    Progression Toward Goals:  Goals not met: 10% improvement in functional capacity - based on max METs achieved in fitness assessment, improved DASI score by 10%, Increase in exercise capacity by 40% - based on peak METs tolerated in cardiac rehab exercise session, Exercise 5 days/wk, >150mins/wk of moderate intensity exercise, Attend Rehab regularly and start exercise program   , Patient will be encouraged to focus on lifestyle modifications following discharge  Education: benefit of exercise for CAD risk factors, home exercise guidelines, RPE scale and class: Risk Factors for Heart Disease   Plan:education on home exercise guidelines  Readiness to change: Contemplation:  (Acknowledging that there is a problem but not yet ready or sure of wanting to make a change)      NUTRITION ASSESSMENT AND PLAN    Weight control:    Starting weight: 156 2 lb   Current weight:   156 4 lb  Waist circumference:    Startin 25 in   Current:      Diabetes: N/A  A1c: 5 3%    last measured: 22    Lipid management: Last lipid profile 21  Chol 125    HDL 31  LDL 63    Goals:HDL >40, TRG <150, eat 3 or more servings of whole grains a day, Eat 4-5 cups of fruits and vegetables daily, choose low sodium processed foods and Increase intake of nuts and seeds    Progression Toward Goals: Goals met: following healthy diet  , Patient will be encouraged to focus on lifestyle modifications following discharge      Education: heart healthy eating  low sodium diet  hydration  nutrition for  lipid management  education class:  Label Reading  Plan: replace refined grain bread with whole grain bread and replace unhealthy snacks with fruits & vegs  Readiness to change: Maintenance: (Maintaining the behavior change)      PSYCHOSOCIAL ASSESSMENT AND PLAN    Emotional:  Depression assessment:  PHQ-9 = 5 5-9 = Mild Depression; not reassessed today            Anxiety measure:  ROSALIE-7 = 5 5-9 = Mild anxiety ; not reassessed today  Self-reported stress level:  4 - reports less stress than with his MI in   Social support: Good     Goals:  Reduce perceived stress to 1-3/10, continue medical therapy, Social Support in [place] Score < 3, Daily Activity in [facility] Score < 3, Overall Health in [facility] Score < 3, Quality of Life in [place] Score < 3 , Increased interest in doing things and improved positive thoughts of well being    Progression Toward Goals: Goals not met: Reduce perceived stress to 1-3/10, PHQ-9 - reduced severity by one level, continue medical therapy, Social Support in [place] Score < 3, Daily Activity in [facility] Score < 3, Overall Health in [facility] Score < 3, Quality of Life in [place] Score < 3 , Increased interest in doing things and improved positive thoughts of well being   , Patient will be encouraged to focus on lifestyle modifications following discharge      Education: signs/sxs of depression, benefits of a positive support system, depression and CAD and class:  Stress and Your Health   Plan: Refer to [address], Exercise and Enjoy a hobby  Readiness to change: Contemplation:  (Acknowledging that there is a problem but not yet ready or sure of wanting to make a change)      OTHER CORE COMPONENTS     Tobacco:   Social History     Tobacco Use   Smoking Status Former Smoker    Quit date: 2008    Years since quittin 4   Smokeless Tobacco Never Used       Tobacco Use Intervention:   Pt quit    and has abstained    Anginal Symptoms:  None   NTG use: No prescription    Blood pressure:    Restin/70 - 118/70   Exercise: 140/70   Recovery: 108/70    Goals: consistent BP < 130/80, reduced dietary sodium <2300mg, moderate intensity exercise >150 mins/wk and medication compliance    Progression Toward Goals: Goals not met: moderate intensity exercise >150 mins/wk   , Goals met: consistent BP < 130/80, reduced dietary sodium <2300mg, and medication compliance , Patient will be encouraged to focus on lifestyle modifications following discharge      Education:  understanding high blood pressure and it's relationship to CAD, low sodium diet and HTN and relapse education  Plan: engage in regular exercise and check labels for sodium content  Readiness to change: Action:  (Changing behavior)

## 2022-04-25 ENCOUNTER — APPOINTMENT (OUTPATIENT)
Dept: CARDIAC REHAB | Facility: CLINIC | Age: 57
End: 2022-04-25
Payer: MEDICARE

## 2022-04-27 ENCOUNTER — APPOINTMENT (OUTPATIENT)
Dept: CARDIAC REHAB | Facility: CLINIC | Age: 57
End: 2022-04-27
Payer: MEDICARE

## 2022-04-29 ENCOUNTER — APPOINTMENT (OUTPATIENT)
Dept: CARDIAC REHAB | Facility: CLINIC | Age: 57
End: 2022-04-29
Payer: MEDICARE

## 2022-05-31 ENCOUNTER — OFFICE VISIT (OUTPATIENT)
Dept: CARDIOLOGY CLINIC | Facility: CLINIC | Age: 57
End: 2022-05-31
Payer: MEDICARE

## 2022-05-31 VITALS
SYSTOLIC BLOOD PRESSURE: 140 MMHG | HEART RATE: 90 BPM | WEIGHT: 155.2 LBS | BODY MASS INDEX: 22.22 KG/M2 | DIASTOLIC BLOOD PRESSURE: 80 MMHG | OXYGEN SATURATION: 97 % | HEIGHT: 70 IN

## 2022-05-31 DIAGNOSIS — I10 PRIMARY HYPERTENSION: ICD-10-CM

## 2022-05-31 DIAGNOSIS — E78.2 MIXED HYPERLIPIDEMIA: ICD-10-CM

## 2022-05-31 DIAGNOSIS — I25.2 OLD MYOCARDIAL INFARCT: ICD-10-CM

## 2022-05-31 DIAGNOSIS — I25.10 CORONARY ARTERY DISEASE INVOLVING NATIVE CORONARY ARTERY OF NATIVE HEART WITHOUT ANGINA PECTORIS: Primary | ICD-10-CM

## 2022-05-31 PROCEDURE — 99214 OFFICE O/P EST MOD 30 MIN: CPT | Performed by: INTERNAL MEDICINE

## 2022-05-31 NOTE — PROGRESS NOTES
Cardiology Follow Up    [facility], [facility] Chaki  1965  [id number]  [address] [facility] [address]  [phone]    Reason for visit:  3 month follow-up for CAD status post remote stent of the left anterior descending artery in 2009 with stenting of the left anterior descending artery after nontransmural myocardial infarction in December with discoverable proximal disease followed by one-vessel CABG with the left internal mammary artery to left anterior descending artery graft in January  Patient also has old anterior wall myocardial infarction with preserved ejection fraction, hypertension and hyperlipidemia  1  Coronary artery disease involving native coronary artery of native heart without angina pectoris     2  Old myocardial infarct     3  Primary hypertension     4  Mixed hyperlipidemia         Interval History:  Since the patient's last visit he does have a lot of shoulder and back pain on both sides  This is gradually getting better  He has been using tramadol and at times and NSAIDs  He denies exertional chest pain or shortness of breath  He denies edema, palpitations or dizziness  He does complain of fatigue and some erectile dysfunction  He also feels he is perhaps experiencing some retrograde ejaculation      Patient Active Problem List   Diagnosis    Primary hypertension    Old myocardial infarct    Anxiety with depression    Coronary artery disease involving native coronary artery of native heart without angina pectoris    Anxiety    Chest pain with high risk for cardiac etiology    Mixed hyperlipidemia    Adenopathy    Episodic cluster headache, not intractable    Chronic pain syndrome    NSTEMI (non-ST elevated myocardial infarction) ([facility] [address] )    S/P CABG x 1    Leukocytosis     Past Medical History:   Diagnosis Date    Angina pectoris (HCC)     Anxiety     Arthritis     Back pain     CAD (coronary artery disease)     DDD (degenerative disc disease), lumbosacral     Depressed     Full dentures     GERD (gastroesophageal reflux disease)     Hyperlipidemia     Hypertension     MI [facility]myocardial [facility]) ([facility] [address] )     2009    Wears glasses      Social History     Socioeconomic History    Marital status: /Civil Union     Spouse name: Not on file    Number of children: Not on file    Years of education: Not on file    Highest education level: Not on file   Occupational History    Not on file   Tobacco Use    Smoking status: Former Smoker     Quit date: 2008     Years since quittin 5    Smokeless tobacco: Never Used   Vaping Use    Vaping Use: Never used   Substance and Sexual Activity    Alcohol use: Never     Alcohol/week: 0 0 standard drinks    Drug use: No    Sexual activity: Not on file   Other Topics Concern    Not on file   Social History Narrative    Not on file     Social Determinants of Health     Financial Resource Strain: Not on file   Food Insecurity: No Food Insecurity    Worried About Running Out of Food in the Last Year: Never true    Vinnie of Food in the Last Year: Never true   Transportation Needs: No Transportation Needs    Lack of Transportation (Medical): No    Lack of Transportation (Non-Medical):  No   Physical Activity: Not on file   Stress: Not on file   Social Connections: Not on file   Intimate Partner Violence: Not on file   Housing Stability: Low Risk     Unable to Pay for Housing in the Last Year: No    Number of Places Lived in the Last Year: 1    Unstable Housing in the Last Year: No      Family History   Problem Relation Age of Onset    Heart attack Father     Heart disease Brother      Past Surgical History:   Procedure Laterality Date    ANGIOPLASTY       LAD    CARDIAC CATHETERIZATION      CARDIAC CATHETERIZATION N/A 2021    Procedure: Cardiac catheterization;  Surgeon: Jose Colindres MD;  Location: [facility]; Service: Cardiology    CARDIAC CATHETERIZATION N/A 12/27/2021    Procedure: Cardiac Coronary Angiogram;  Surgeon: Birdie Nye MD;  [facility]: [facility]; Service: Cardiology    CARDIAC CATHETERIZATION N/A 12/27/2021    Procedure: Cardiac pci;  Surgeon: Birdie Nye MD;  [facility]; Service: Cardiology    NASAL SINUS SURGERY      CA CABG, ARTERY-VEIN, SINGLE N/A 1/13/2022    Procedure: CORONARY ARTERY BYPASS GRAFT (CABG) x1- LIMA to LAD;  Surgeon: Lety Nolasco MD;  [facility];  Service: Cardiac Surgery    CA ECHO TRANSESOPHAG [address] N/A 1/13/2022    Procedure: TRANSESOPHAGEAL ECHOCARDIOGRAM (ELADIA); Surgeon: Lety Nolasco MD;  [facility];  Service: Cardiac Surgery    CA REMOVE ARMPIT LYMPH NODES [address] Right 11/21/2018    Procedure: EXCISION BIOPSY LYMPH NODE AXILLARY RIGHT;  Surgeon: Mary Canales MD;  Location: [facility];  Service: General       Current Outpatient Medications:     aspirin 81 MG tablet, Take 81 mg by mouth daily  , Disp: , Rfl:     atorvastatin (LIPITOR) 80 mg tablet, Take 1 tablet (80 mg total) by mouth daily, Disp: 90 tablet, Rfl: 3    clonazePAM (KlonoPIN) 1 mg tablet, TK 1/2 T PO BID PRN, Disp: , Rfl: 1    clopidogrel (PLAVIX) 75 mg tablet, TAKE 1 TABLET(75 MG) BY MOUTH DAILY, Disp: 90 tablet, Rfl: 3    ezetimibe (ZETIA) 10 mg tablet, TAKE 1 TABLET(10 MG) BY MOUTH DAILY, Disp: 90 tablet, Rfl: 3    gabapentin (NEURONTIN) 600 MG tablet, Take 600 mg by mouth 3 (three) times a day, Disp: , Rfl:     methocarbamol (ROBAXIN) 750 mg tablet, Take 750 mg by mouth every 6 (six) hours as needed for muscle spasms, Disp: , Rfl:     metoprolol tartrate (LOPRESSOR) 25 mg tablet, Take 1 tablet (25 mg total) by mouth every 12 (twelve) hours, Disp: 180 tablet, Rfl: 3    mirtazapine (REMERON) 45 MG tablet, Take 45 mg by mouth daily at bedtime  , Disp: , Rfl:     omeprazole (PriLOSEC) 20 mg delayed release capsule, Take 20 mg by mouth daily in the early morning  , Disp: , Rfl:     QUEtiapine (SEROquel) 100 mg tablet, Take 100 mg by mouth daily at bedtime  , Disp: , Rfl:     traMADol (ULTRAM) 50 mg tablet, Take 50 mg by mouth, Disp: , Rfl:     verapamil (CALAN) 80 mg tablet, Take 1 tablet (80 mg total) by mouth every 12 (twelve) hours, Disp: 180 tablet, Rfl: 1    acetaminophen (TYLENOL) 500 mg tablet, Take 500 mg by mouth every 6 (six) hours as needed for mild pain For the pain around eyes  (Patient not taking: Reported on 5/31/2022), Disp: , Rfl:     docusate sodium (COLACE) 100 mg capsule, Take 1 capsule (100 mg total) by mouth 2 (two) times a day Hold for soft stools  (Patient not taking: Reported on 2/11/2022 ), Disp: 60 capsule, Rfl: 0    oxyCODONE (ROXICODONE) 5 immediate release tablet, Take 1/2 to 1 tablet Q4-6 hours PRN pain  (Patient not taking: No sig reported), Disp: 30 tablet, Rfl: 0    polyethylene glycol (MIRALAX) 17 g packet, Take 17 g by mouth daily Hold for soft stools  (Patient not taking: Reported on 1/28/2022 ), Disp: 30 each, Rfl: 0    potassium chloride (K-DUR,KLOR-CON) 20 mEq tablet, Take 1 tablet (20 mEq total) by mouth daily for 10 days Unless otherwise directed  (Patient not taking: Reported on 1/28/2022 ), Disp: 10 tablet, Rfl: 1    torsemide (DEMADEX) 20 mg tablet, Take 1 tablet (20 mg total) by mouth daily for 10 days Unless otherwise directed  (Patient not taking: Reported on 1/28/2022 ), Disp: 10 tablet, Rfl: 1    VIIBRYD 40 MG tablet, Take 40 mg by mouth daily with breakfast   (Patient not taking: Reported on 5/31/2022), Disp: , Rfl:   No Known Allergies      Review of Systems:  Review of Systems   Constitutional: Positive for fatigue  Negative for activity change, appetite change and unexpected weight change  Respiratory: Positive for cough and shortness of breath (with muscular pains)  Negative for chest tightness and wheezing      Cardiovascular: Negative for chest pain, palpitations and leg swelling  Gastrointestinal: Negative for abdominal pain, blood in stool, constipation and diarrhea  Genitourinary: Negative for dysuria, frequency, hematuria and urgency  Musculoskeletal: Positive for arthralgias and back pain  Negative for gait problem and joint swelling  Neurological: Positive for dizziness  Negative for speech difficulty, light-headedness and headaches  Psychiatric/Behavioral: Positive for dysphoric mood  Negative for agitation, behavioral problems, confusion and decreased concentration  Physical Exam:  Vitals:    05/31/22 1452   BP: 140/80   Pulse: 90   SpO2: 97%   Weight: 70 4 kg (155 lb 3 2 oz)   Height: 5' 10" (1 778 m)       Physical Exam  Constitutional:       General: He is not in acute distress  Appearance: He is normal weight  He is not ill-appearing  HENT:      Head: Normocephalic and atraumatic  Mouth/Throat:      Mouth: Mucous membranes are moist       Pharynx: No oropharyngeal exudate or posterior oropharyngeal erythema  Eyes:      General: No scleral icterus  Conjunctiva/sclera: Conjunctivae normal    Neck:      Thyroid: No thyroid mass or thyromegaly  Vascular: Normal carotid pulses  No carotid bruit or JVD  Cardiovascular:      Rate and Rhythm: Normal rate and regular rhythm  Pulses: Normal pulses  Heart sounds: No murmur heard  No friction rub  No gallop  Pulmonary:      Breath sounds: Normal breath sounds  No wheezing, rhonchi or rales  Abdominal:      Palpations: Abdomen is soft  There is no hepatomegaly, splenomegaly or mass  Tenderness: There is no abdominal tenderness  Musculoskeletal:         General: Deformity (kyphosis) present  No swelling  Cervical back: Neck supple  Skin:     General: Skin is warm  Coloration: Skin is not jaundiced or pale  Findings: No bruising, erythema or rash  Neurological:      General: No focal deficit present  Mental Status: He is oriented to person, place, and time  Cranial Nerves: No cranial nerve deficit  Sensory: No sensory deficit  Motor: No weakness  Psychiatric:         Mood and Affect: Mood normal          Behavior: Behavior normal          Thought Content: Thought content normal          Judgment: Judgment normal          Discussion/Summary:  1  CAD status post remote stenting of the LAD with more recent stenting of the proximal left anterior descending artery after nontransmural myocardial infarction  Patient was subsequently found to have downstream disease in the LAD and underwent left internal mammary artery to left anterior descending artery graft in January  He is maintained on dual antiplatelet therapy in light of his stent done in late December  I will stop his aspirin in 1 months time and continue clopidogrel going forward  2  Anterior apical myocardial infarction in 2009 with preserved ejection fraction  Patient on beta-blocker which is novel to him but experiencing some erectile dysfunction  Will wean him off this over the next 3 days  If needed we can add lisinopril for blood pressure control to verapamil  He had been taking lisinopril 20 mg daily preoperatively  3  Hypertension  Blood pressure controlled on metoprolol 25 mg b i d  And verapamil 80 mg b i d  See above comments about weaning metoprolol off  Perhaps adding ACE-inhibitor if needed  4  Hyperlipidemia  Patient on atorvastatin 80 mg daily and ezetimibe 10 mg daily  LDL cholesterol preoperatively on 40 mg of atorvastatin and ezetimibe was 63        Follow-up 6 months with associate    Radha Delcid MD

## 2022-07-19 ENCOUNTER — HOSPITAL ENCOUNTER (EMERGENCY)
Facility: HOSPITAL | Age: 57
Discharge: HOME | End: 2022-07-20
Attending: EMERGENCY MEDICINE | Admitting: EMERGENCY MEDICINE
Payer: MEDICARE

## 2022-07-19 ENCOUNTER — APPOINTMENT (EMERGENCY)
Dept: RADIOLOGY | Facility: HOSPITAL | Age: 57
End: 2022-07-19
Payer: MEDICARE

## 2022-07-19 VITALS
WEIGHT: 154.54 LBS | OXYGEN SATURATION: 96 % | DIASTOLIC BLOOD PRESSURE: 65 MMHG | BODY MASS INDEX: 22.17 KG/M2 | TEMPERATURE: 98.2 F | RESPIRATION RATE: 20 BRPM | HEART RATE: 90 BPM | SYSTOLIC BLOOD PRESSURE: 110 MMHG

## 2022-07-19 DIAGNOSIS — R06.02 SOB (SHORTNESS OF BREATH): ICD-10-CM

## 2022-07-19 DIAGNOSIS — I31.39 PERICARDIAL EFFUSION: Primary | ICD-10-CM

## 2022-07-19 LAB
ALBUMIN SERPL BCP-MCNC: 3.7 G/DL (ref 3.5–5)
ALP SERPL-CCNC: 79 U/L (ref 46–116)
ALT SERPL W P-5'-P-CCNC: 28 U/L (ref 12–78)
ANION GAP SERPL CALCULATED.3IONS-SCNC: 10 MMOL/L (ref 4–13)
APTT PPP: 29 SECONDS (ref 23–37)
AST SERPL W P-5'-P-CCNC: 16 U/L (ref 5–45)
BASOPHILS # BLD AUTO: 0.04 THOUSANDS/ΜL (ref 0–0.1)
BASOPHILS NFR BLD AUTO: 0 % (ref 0–1)
BILIRUB SERPL-MCNC: 0.87 MG/DL (ref 0.2–1)
BUN SERPL-MCNC: 12 MG/DL (ref 5–25)
CALCIUM SERPL-MCNC: 9 MG/DL (ref 8.3–10.1)
CARDIAC TROPONIN I PNL SERPL HS: 8 NG/L
CHLORIDE SERPL-SCNC: 101 MMOL/L (ref 96–108)
CO2 SERPL-SCNC: 27 MMOL/L (ref 21–32)
CREAT SERPL-MCNC: 1.01 MG/DL (ref 0.6–1.3)
EOSINOPHIL # BLD AUTO: 0.2 THOUSAND/ΜL (ref 0–0.61)
EOSINOPHIL NFR BLD AUTO: 2 % (ref 0–6)
ERYTHROCYTE [DISTWIDTH] IN BLOOD BY AUTOMATED COUNT: 12.2 % (ref 11.6–15.1)
FLUAV RNA RESP QL NAA+PROBE: NEGATIVE
FLUBV RNA RESP QL NAA+PROBE: NEGATIVE
GFR SERPL CREATININE-BSD FRML MDRD: 81 ML/MIN/1.73SQ M
GLUCOSE SERPL-MCNC: 98 MG/DL (ref 65–140)
HCT VFR BLD AUTO: 47.1 % (ref 36.5–49.3)
HGB BLD-MCNC: 16.1 G/DL (ref 12–17)
IMM GRANULOCYTES # BLD AUTO: 0.09 THOUSAND/UL (ref 0–0.2)
IMM GRANULOCYTES NFR BLD AUTO: 1 % (ref 0–2)
INR PPP: 1.12 (ref 0.84–1.19)
LYMPHOCYTES # BLD AUTO: 2.33 THOUSANDS/ΜL (ref 0.6–4.47)
LYMPHOCYTES NFR BLD AUTO: 20 % (ref 14–44)
MCH RBC QN AUTO: 30.6 PG (ref 26.8–34.3)
MCHC RBC AUTO-ENTMCNC: 34.2 G/DL (ref 31.4–37.4)
MCV RBC AUTO: 89 FL (ref 82–98)
MONOCYTES # BLD AUTO: 0.82 THOUSAND/ΜL (ref 0.17–1.22)
MONOCYTES NFR BLD AUTO: 7 % (ref 4–12)
NEUTROPHILS # BLD AUTO: 8.33 THOUSANDS/ΜL (ref 1.85–7.62)
NEUTS SEG NFR BLD AUTO: 70 % (ref 43–75)
NRBC BLD AUTO-RTO: 0 /100 WBCS
NT-PROBNP SERPL-MCNC: 613 PG/ML
PLATELET # BLD AUTO: 232 THOUSANDS/UL (ref 149–390)
PMV BLD AUTO: 10.6 FL (ref 8.9–12.7)
POTASSIUM SERPL-SCNC: 4.1 MMOL/L (ref 3.5–5.3)
PROT SERPL-MCNC: 8 G/DL (ref 6.4–8.4)
PROTHROMBIN TIME: 14.5 SECONDS (ref 11.6–14.5)
RBC # BLD AUTO: 5.27 MILLION/UL (ref 3.88–5.62)
RSV RNA RESP QL NAA+PROBE: NEGATIVE
SARS-COV-2 RNA RESP QL NAA+PROBE: NEGATIVE
SODIUM SERPL-SCNC: 138 MMOL/L (ref 135–147)
WBC # BLD AUTO: 11.81 THOUSAND/UL (ref 4.31–10.16)

## 2022-07-19 PROCEDURE — 36415 COLL VENOUS BLD VENIPUNCTURE: CPT | Performed by: EMERGENCY MEDICINE

## 2022-07-19 PROCEDURE — 71045 X-RAY EXAM CHEST 1 VIEW: CPT

## 2022-07-19 PROCEDURE — 83880 ASSAY OF NATRIURETIC PEPTIDE: CPT | Performed by: EMERGENCY MEDICINE

## 2022-07-19 PROCEDURE — 85610 PROTHROMBIN TIME: CPT | Performed by: EMERGENCY MEDICINE

## 2022-07-19 PROCEDURE — 85025 COMPLETE CBC W/AUTO DIFF WBC: CPT | Performed by: EMERGENCY MEDICINE

## 2022-07-19 PROCEDURE — 93005 ELECTROCARDIOGRAM TRACING: CPT

## 2022-07-19 PROCEDURE — 99285 EMERGENCY DEPT VISIT HI MDM: CPT | Performed by: EMERGENCY MEDICINE

## 2022-07-19 PROCEDURE — 84484 ASSAY OF TROPONIN QUANT: CPT | Performed by: EMERGENCY MEDICINE

## 2022-07-19 PROCEDURE — 0241U HB NFCT DS VIR RESP RNA 4 TRGT: CPT | Performed by: EMERGENCY MEDICINE

## 2022-07-19 PROCEDURE — 85730 THROMBOPLASTIN TIME PARTIAL: CPT | Performed by: EMERGENCY MEDICINE

## 2022-07-19 PROCEDURE — 99284 EMERGENCY DEPT VISIT MOD MDM: CPT

## 2022-07-19 PROCEDURE — 80053 COMPREHEN METABOLIC PANEL: CPT | Performed by: EMERGENCY MEDICINE

## 2022-07-20 DIAGNOSIS — I31.39 PERICARDIAL EFFUSION: Primary | ICD-10-CM

## 2022-07-20 LAB
ATRIAL RATE: 97 BPM
P AXIS: 85 DEGREES
PR INTERVAL: 166 MS
QRS AXIS: -47 DEGREES
QRSD INTERVAL: 90 MS
QT INTERVAL: 352 MS
QTC INTERVAL: 447 MS
T WAVE AXIS: 92 DEGREES
VENTRICULAR RATE: 97 BPM

## 2022-07-20 PROCEDURE — 93010 ELECTROCARDIOGRAM REPORT: CPT | Performed by: INTERNAL MEDICINE

## 2022-07-20 NOTE — ED PROVIDER NOTES
History  Chief Complaint   Patient presents with    Fever - 9 weeks to 74 years     Pt reports having a fever today along with fatigue, sob, "chest fullness" and general discomfort  He also reports back pain  Pt took tylenol about 1 5 hours ago  This is a 57-year-old male with a history of CAD status post CABG in January 2022, hypertension, chronic pain, hyperlipidemia who presents with multiple symptoms  Patient states that approximately 1 week after his surgery, he started to experience upper back pain located between his shoulders associated with fatigue  He has been taking Tylenol without relief  Over the past few days, the patient has been experiencing a sensation like "my chest is filling up" which is constant and associated with lightheadedness  Denies any pain  He does admit to increased bilateral lower extremity swelling  States that he does have a dry cough  Admits to a fever today of 100° F  No known sick contacts  Denies any alleviating or exacerbating factors for his symptoms  No exertional component for the "chest fullness"  Denies any nausea/vomiting  Denies fever/chills, nausea/vomiting, dizziness, numbness/weakness, headache, change in vision, URI symptoms, neck pain, chest pain, palpitations, back pain, flank pain, abdominal pain, diarrhea, hematochezia, melena, dysuria, hematuria  Bedside echocardiogram performed and interpreted by myself  Parasternal long axis, parasternal short axis, apical 4 chamber, subxiphoid views obtained  Normal EF  No regional wall motion abnormalities  Moderate-sized pericardial effusion  No evidence of tamponade  Images in PACS  12:43 AM  Spoke with Dr Bella Rocha with cardiology regarding the patient and the findings of my bedside echocardiogram   Recommends admission for formal echocardiogram in the morning  I spoke with the patient regarding Cardiology recommendations and the need for admission  Patient is refusing to stay    I told the patient that I am only train to identify immediately life-threatening conditions on bedside echocardiogram   The pericardial effusion may be loculated and more extensive than I was able to identify  I explained the risks of going home without a formal echo including death, cardiac arrest, respiratory failure, syncope, traumatic injury related to syncope, permanent disability, prolonged hospital stay  Patient understands and would still like to go home  He was told to call Cardiology 1st thing in the morning  I did explain that he may not be able to obtain a formal echo for several days, maybe even weeks  He also may not be able to get a stat appointment as an outpatient  Again, patient understands  Strict return precautions given  Prior to Admission Medications   Prescriptions Last Dose Informant Patient Reported? Taking? QUEtiapine (SEROquel) 100 mg tablet  Self Yes Yes   Sig: Take 100 mg by mouth daily at bedtime     VIIBRYD 40 MG tablet Not Taking at Unknown time  Yes No   Sig: Take 40 mg by mouth daily with breakfast     Patient not taking: No sig reported   acetaminophen (TYLENOL) 500 mg tablet   Yes Yes   Sig: Take 500 mg by mouth every 6 (six) hours as needed for mild pain For the pain around eyes   atorvastatin (LIPITOR) 80 mg tablet   No Yes   Sig: Take 1 tablet (80 mg total) by mouth daily   clonazePAM (KlonoPIN) 1 mg tablet  Self Yes Yes   Sig: TK 1/2 T PO BID PRN   clopidogrel (PLAVIX) 75 mg tablet  Self No Yes   Sig: TAKE 1 TABLET(75 MG) BY MOUTH DAILY   ezetimibe (ZETIA) 10 mg tablet  Self No Yes   Sig: TAKE 1 TABLET(10 MG) BY MOUTH DAILY   gabapentin (NEURONTIN) 600 MG tablet   Yes Yes   Sig: Take 600 mg by mouth 3 (three) times a day   methocarbamol (ROBAXIN) 750 mg tablet   Yes Yes   Sig: Take 750 mg by mouth every 6 (six) hours as needed for muscle spasms   metoprolol tartrate (LOPRESSOR) 25 mg tablet Not Taking at Unknown time  No No   Sig: Take 1 tablet (25 mg total) by mouth every 12 (twelve) hours   Patient not taking: Reported on 7/19/2022   mirtazapine (REMERON) 45 MG tablet  Self Yes Yes   Sig: Take 45 mg by mouth daily at bedtime  omeprazole (PriLOSEC) 20 mg delayed release capsule  Self Yes Yes   Sig: Take 20 mg by mouth daily in the early morning     oxyCODONE (ROXICODONE) 5 immediate release tablet Not Taking at Unknown time  No No   Sig: Take 1/2 to 1 tablet Q4-6 hours PRN pain  Patient not taking: No sig reported   polyethylene glycol (MIRALAX) 17 g packet  Self No No   Sig: Take 17 g by mouth daily Hold for soft stools  Patient not taking: Reported on 1/28/2022    potassium chloride (K-DUR,KLOR-CON) 20 mEq tablet   No No   Sig: Take 1 tablet (20 mEq total) by mouth daily for 10 days Unless otherwise directed  Patient not taking: Reported on 1/28/2022    torsemide (DEMADEX) 20 mg tablet   No No   Sig: Take 1 tablet (20 mg total) by mouth daily for 10 days Unless otherwise directed  Patient not taking: Reported on 1/28/2022    traMADol (ULTRAM) 50 mg tablet   Yes Yes   Sig: Take 50 mg by mouth   verapamil (CALAN) 80 mg tablet  Self No Yes   Sig: Take 1 tablet (80 mg total) by mouth every 12 (twelve) hours      Facility-Administered Medications: None       Past Medical History:   Diagnosis Date    Angina pectoris ([facility] )     Anxiety     Arthritis     Back pain     CAD (coronary artery disease)     DDD (degenerative disc disease), lumbosacral     Depressed     Full dentures     GERD (gastroesophageal reflux disease)     Hyperlipidemia     Hypertension     MI (myocardial infarction) ([facility] )     2009    Wears glasses        Past Surgical History:   Procedure Laterality Date    ANGIOPLASTY  2009     LAD    CARDIAC CATHETERIZATION      CARDIAC CATHETERIZATION N/A 12/27/2021    Procedure: Cardiac catheterization;  Surgeon: Roxana Morel MD;  [facility];   Service: Cardiology    CARDIAC CATHETERIZATION N/A 12/27/2021    Procedure: Cardiac Coronary Angiogram;  Surgeon: Marc Boo MD;  [facility]; Service: Cardiology    CARDIAC CATHETERIZATION N/A 2021    Procedure: Cardiac pci;  Surgeon: Marc Boo MD;  [facility]; Service: Cardiology    NASAL SINUS SURGERY      [place] CABG, ARTERY-VEIN, SINGLE N/A 2022    Procedure: CORONARY ARTERY BYPASS GRAFT (CABG) x1- LIMA to LAD;  Surgeon: Prince Steve MD;  [facility];  Service: Cardiac Surgery    [place] E[place]ANS[place] [address] N/A 2022    Procedure: TRANSESOPHAGEAL ECHOCARDIOGRAM (ELADIA); Surgeon: Prince Steve MD;  [facility];  Service: Cardiac Surgery    [place] REMOVE ARMPIT LYMPH NODES [address] Right 2018    Procedure: EXCISION BIOPSY LYMPH NODE AXILLARY RIGHT;  Surgeon: Serjio Haider MD;  Location[facility];  Service: General       Family History   Problem Relation Age of Onset    Heart attack Father     Heart disease Brother      I have reviewed and agree with the history as documented  E-Cigarette/Vaping    E-Cigarette Use Never User      E-Cigarette/Vaping Substances    Nicotine No     THC No     CBD No     Flavoring No     Other No     Unknown No      Social History     Tobacco Use    Smoking status: Former Smoker     Quit date: 2008     Years since quittin 6    Smokeless tobacco: Never Used   Vaping Use    Vaping Use: Never used   Substance Use Topics    Alcohol use: Never     Alcohol/week: 0 0 standard drinks    Drug use: No       Review of Systems   Constitutional: Positive for fatigue and fever  Negative for chills  HENT: Negative for rhinorrhea, sore throat and trouble swallowing  Eyes: Negative for photophobia and visual disturbance  Respiratory: Positive for cough and shortness of breath  Negative for chest tightness  Cardiovascular: Positive for leg swelling  Negative for chest pain and palpitations          Chest fullness Gastrointestinal: Negative for abdominal pain, blood in stool, diarrhea, nausea and vomiting  Endocrine: Negative for polyuria  Genitourinary: Negative for dysuria, flank pain and hematuria  Musculoskeletal: Negative for back pain and neck pain  Skin: Negative for color change and rash  Allergic/Immunologic: Negative for immunocompromised state  Neurological: Positive for light-headedness  Negative for dizziness, weakness, numbness and headaches  All other systems reviewed and are negative  Physical Exam  Physical Exam  Constitutional:       General: He is not in acute distress  Appearance: Normal appearance  He is well-developed  HENT:      Mouth/Throat:      Pharynx: Uvula midline  Eyes:      General: Lids are normal       Conjunctiva/sclera: Conjunctivae normal       Pupils: Pupils are equal, round, and reactive to light  Neck:      Thyroid: No thyroid mass or thyromegaly  Trachea: Trachea normal    Cardiovascular:      Rate and Rhythm: Regular rhythm  Tachycardia present  Heart sounds: Normal heart sounds  No murmur heard  Pulmonary:      Effort: Pulmonary effort is normal       Breath sounds: Normal breath sounds  Comments: Speaking in full sentences  Saturating well on room air  Chest:      Comments: Sternotomy scar present  Well-healing  Abdominal:      General: Bowel sounds are normal       Palpations: Abdomen is soft  Tenderness: There is no abdominal tenderness  There is no guarding or rebound  Negative signs include Carr's sign  Musculoskeletal:      Comments: No pitting edema bilateral lower extremities  Skin:     General: Skin is warm and dry  Neurological:      Mental Status: He is alert  Psychiatric:         Speech: Speech normal          Behavior: Behavior normal  Behavior is cooperative  Thought Content:  Thought content normal          Vital Signs  ED Triage Vitals [07/19/22 2220]   Temperature Pulse Respirations Blood Pressure SpO2   98 2 °F (36 8 °C) (!) 111 (!) 24 135/65 100 %      Temp Source Heart Rate Source Patient Position - Orthostatic VS BP Location FiO2 (%)   Oral Monitor Sitting Right arm --      Pain Score       --           Vitals:    07/19/22 2220 07/19/22 2323   BP: 135/65 110/65   Pulse: (!) 111 90   Patient Position - Orthostatic VS: Sitting Lying         Visual Acuity      ED Medications  Medications - No data to display    Diagnostic Studies  Results Reviewed     Procedure Component Value Units Date/Time    HS Troponin I 4hr [[id number]]     Lab Status: No result Specimen: Blood     FLU/RSV/COVID - if FLU/RSV clinically relevant [[id number]]  (Normal) Collected: 07/19/22 2302    Lab Status: Final result Specimen: Nares from Nose Updated: 07/19/22 2351     SARS-CoV-2 Negative     INFLUENZA A PCR Negative     INFLUENZA B PCR Negative     RSV PCR Negative    Narrative:      FOR PEDIATRIC PATIENTS - copy/paste COVID Guidelines URL to browser: [email or web address]    SARS-CoV-2 assay is a Nucleic Acid Amplification assay intended for the  qualitative detection of nucleic acid from SARS-CoV-2 in nasopharyngeal  swabs  Results are for the presumptive identification of SARS-CoV-2 RNA  Positive results are indicative of infection with SARS-CoV-2, the virus  causing COVID-19, but do not rule out bacterial infection or co-infection  with other viruses  Laboratories within the [place] and its  territories are required to report all positive results to the appropriate  public health authorities  Negative results do not preclude SARS-CoV-2  infection and should not be used as the sole basis for treatment or other  patient management decisions  Negative results must be combined with  clinical observations, patient history, and epidemiological information  This test has not been FDA cleared or approved      This test has been authorized by FDA under an Emergency Use Authorization  (EUA)  This test is only authorized for the duration of time the  declaration that circumstances exist justifying the authorization of the  emergency use of an in vitro diagnostic tests for detection of SARS-CoV-2  virus and/or diagnosis of COVID-19 infection under section 564(b)(1) of  the Act, 21 U  S C  012UJQ-8(G)(6), unless the authorization is terminated  or revoked sooner  The test has been validated but independent review by FDA  and CLIA is pending  Test performed using [employer] Gene[employer]pert: This RT-PCR assay targets N2,  a region unique to SARS-CoV-2  A conserved region in the E-gene was chosen  for pan-Sarbecovirus detection which includes SARS-CoV-2      HS Troponin 0hr (reflex protocol) [[id number]]  (Normal) Collected: 07/19/22 2302    Lab Status: Final result Specimen: Blood from Arm, Right Updated: 07/19/22 2338     hs TnI 0hr 8 ng/L     HS Troponin I 2hr [[id number]]     Lab Status: No result Specimen: Blood     NT-BNP PRO [[id number]]  (Abnormal) Collected: 07/19/22 2302    Lab Status: Final result Specimen: Blood from Arm, Right Updated: 07/19/22 2337     NT-proBNP 613 pg/mL     Comprehensive metabolic panel [[id number]] Collected: 07/19/22 2302    Lab Status: Final result Specimen: Blood from Arm, Right Updated: 07/19/22 2331     Sodium 138 mmol/L      Potassium 4 1 mmol/L      Chloride 101 mmol/L      CO2 27 mmol/L      ANION GAP 10 mmol/L      BUN 12 mg/dL      Creatinine 1 01 mg/dL      Glucose 98 mg/dL      Calcium 9 0 mg/dL      AST 16 U/L      ALT 28 U/L      Alkaline Phosphatase 79 U/L      Total Protein 8 0 g/dL      Albumin 3 7 g/dL      Total Bilirubin 0 87 mg/dL      eGFR 81 ml/min/1 73sq m     Narrative:      [place] guidelines for Chronic Kidney Disease (CKD):     Stage 1 with normal or high GFR (GFR > 90 mL/min/1 73 square meters)    Stage 2 Mild CKD (GFR = 60-89 mL/min/1 73 square meters)    Stage 3A Moderate CKD (GFR = 45-59 mL/min/1 73 square meters)    Stage 3B Moderate CKD (GFR = 30-44 mL/min/1 73 square meters)    Stage 4 Severe CKD (GFR = 15-29 mL/min/1 73 square meters)    Stage 5 End Stage CKD (GFR <15 mL/min/1 73 square meters)  Note: GFR calculation is accurate only with a steady state creatinine    Protime-INR [[id number]]  (Normal) Collected: 07/19/22 2302    Lab Status: Final result Specimen: Blood from Arm, Right Updated: 07/19/22 2323     Protime 14 5 seconds      INR 1 12    APTT [[id number]]  (Normal) Collected: 07/19/22 2302    Lab Status: Final result Specimen: Blood from Arm, Right Updated: 07/19/22 2323     PTT 29 seconds     CBC and differential [[id number]]  (Abnormal) Collected: 07/19/22 2302    Lab Status: Final result Specimen: Blood from Arm, Right Updated: 07/19/22 2315     WBC 11 81 Thousand/uL      RBC 5 27 Million/uL      Hemoglobin 16 1 g/dL      Hematocrit 47 1 %      MCV 89 fL      MCH 30 6 pg      MCHC 34 2 g/dL      RDW 12 2 %      MPV 10 6 fL      Platelets 337 Thousands/uL      nRBC 0 /100 WBCs      Neutrophils Relative 70 %      Immat GRANS % 1 %      Lymphocytes Relative 20 %      Monocytes Relative 7 %      Eosinophils Relative 2 %      Basophils Relative 0 %      Neutrophils Absolute 8 33 Thousands/µL      Immature Grans Absolute 0 09 Thousand/uL      Lymphocytes Absolute 2 33 Thousands/µL      Monocytes Absolute 0 82 Thousand/µL      Eosinophils Absolute 0 20 Thousand/µL      Basophils Absolute 0 04 Thousands/µL                  XR chest 1 view portable   ED Interpretation by Jose Dawson MD (07/20 0004)   No acute cardiopulmonary disease as interpreted by myself                   Procedures  ECG 12 Lead Documentation Only    Date/Time: 7/19/2022 11:06 PM  Performed by: Jose Dawson MD  Authorized by: Jose Dawson MD     ECG reviewed by me, the ED Provider: yes    Patient location:  ED  Previous ECG:     Previous ECG:  Compared to current    Comparison ECG info:  1/16/22    Similarity:  Changes noted    Comparison to cardiac monitor: Yes    Interpretation:     Interpretation: non-specific    Rate:     ECG rate:  97    ECG rate assessment: normal    Rhythm:     Rhythm: sinus rhythm    Ectopy:     Ectopy: none    QRS:     QRS axis:  Left    QRS intervals:  Normal  Conduction:     Conduction: normal    ST segments:     ST segments:  Normal  T waves:     T waves: normal    Comments:      Previous T-wave inversions of the anterior lateral leads are now upright  ED Course  ED Course as of 07/20/22 0050   Tue Jul 19, 2022   2353 NT-proBNP(!): 613  Euvolemic on exam   Wed Jul 20, 2022   0028 TT sent to Dr Sharonda Cohen with Cardiology given the pericardial effusion on bedside echo  SBIRT 20yo+    Flowsheet Row Most Recent Value   SBIRT (25 yo +)    In order to provide better care to our patients, we are screening all of our patients for alcohol and drug use  Would it be okay to ask you these screening questions? Yes Filed at: 07/20/2022 3078   Initial Alcohol Screen: US AUDIT-C     1  How often do you have a drink containing alcohol? 0 Filed at: 07/20/2022 0012   2  How many drinks containing alcohol do you have on a typical day you are drinking? 0 Filed at: 07/20/2022 0012   3a  Male UNDER 65: How often do you have five or more drinks on one occasion? 0 Filed at: 07/20/2022 0012   Audit-C Score 0 Filed at: 07/20/2022 6387   KETURAH: How many times in the past year have you    Used an illegal drug or used a prescription medication for non-medical reasons? Never Filed at: 07/20/2022 0012                    MDM  Number of Diagnoses or Management Options  Diagnosis management comments: Will check labs, EKG, chest x-ray    Will check a bedside echocardiogram       Disposition  Final diagnoses:   Pericardial effusion   SOB (shortness of breath)     Time reflects when diagnosis was documented in both MDM as applicable and the Disposition within this note     Time User Action Codes Description Comment    7/20/2022 12:45 AM Clemetine Livings P Add [I31 3] Pericardial effusion     7/20/2022 12:45 AM Clemetine Livings P Add [R06 02] SOB (shortness of breath)       ED Disposition     ED Disposition   Discharge    Condition   Stable    Date/Time   Wed Jul 20, 2022 12:45 AM    Comment   Corry Grosslatoniatheodore discharge to home/self care  Follow-up Information     Follow up With Specialties Details Why Contact Info Additional [address] Cardiology Call  first thing in the morning [facility] [address]  [address] [place], [place], [address]    [facility] Emergency Department Emergency Medicine Go to  If symptoms worsen [facility] [address]  [facility] Emergency Department, [address] [place], [place], [address]          Patient's Medications   Discharge Prescriptions    No medications on file       No discharge procedures on file      PDMP Review       Value Time User    PDMP Reviewed  Yes 1/16/2022  8:57 AM Yvrose Diallo PA-C          ED Provider  Electronically Signed by           Colin Willson MD  07/20/22 0298

## 2022-07-20 NOTE — PROGRESS NOTES
Patient in the emergency department yesterday with shortness of breath and back pain  According to ED physician, workup was negative but bedside echocardiogram revealed moderate pericardial effusion  Patient was recommended to stay, although refused  Patient's son not calling for urgent appointment and echocardiogram   Will order echocardiogram to be done in the near future

## 2022-07-22 ENCOUNTER — HOSPITAL ENCOUNTER (OUTPATIENT)
Dept: NON INVASIVE DIAGNOSTICS | Facility: HOSPITAL | Age: 57
Discharge: HOME | End: 2022-07-22
Attending: INTERNAL MEDICINE
Payer: MEDICARE

## 2022-07-22 VITALS
BODY MASS INDEX: 22.05 KG/M2 | WEIGHT: 154 LBS | HEIGHT: 70 IN | DIASTOLIC BLOOD PRESSURE: 65 MMHG | SYSTOLIC BLOOD PRESSURE: 110 MMHG | HEART RATE: 90 BPM

## 2022-07-22 DIAGNOSIS — I31.39 PERICARDIAL EFFUSION: ICD-10-CM

## 2022-07-22 LAB
AORTIC ROOT: 3.1 CM
APICAL FOUR CHAMBER EJECTION FRACTION: 49 %
ASCENDING AORTA: 3 CM
E WAVE DECELERATION TIME: 133 MS
FRACTIONAL SHORTENING: 29 % (ref 28–44)
INTERVENTRICULAR SEPTUM IN DIASTOLE (PARASTERNAL SHORT AXIS VIEW): 1.1 CM
INTERVENTRICULAR SEPTUM: 1.1 CM (ref 0.6–1.1)
LAAS-AP2: 16.3 CM2
LAAS-AP4: 16.9 CM2
LEFT ATRIUM SIZE: 3.6 CM
LEFT INTERNAL DIMENSION IN SYSTOLE: 3.4 CM (ref 2.1–4)
LEFT VENTRICULAR INTERNAL DIMENSION IN DIASTOLE: 4.8 CM (ref 3.5–6)
LEFT VENTRICULAR POSTERIOR WALL IN END DIASTOLE: 1.1 CM
LEFT VENTRICULAR STROKE VOLUME: 62 ML
LVSV (TEICH): 62 ML
MV E'TISSUE VEL-SEP: 6 CM/S
MV PEAK A VEL: 1.05 M/S
MV PEAK E VEL: 67 CM/S
MV STENOSIS PRESSURE HALF TIME: 39 MS
MV VALVE AREA P 1/2 METHOD: 5.64 CM2
RIGHT ATRIAL 2D VOLUME: 32 ML
RIGHT ATRIUM AREA SYSTOLE A4C: 13.8 CM2
RIGHT VENTRICLE ID DIMENSION: 3.5 CM
SL CV LEFT ATRIUM LENGTH A2C: 4.8 CM
SL CV LV EF: 55
SL CV PED ECHO LEFT VENTRICLE DIASTOLIC VOLUME (MOD BIPLANE) 2D: 108 ML
SL CV PED ECHO LEFT VENTRICLE SYSTOLIC VOLUME (MOD BIPLANE) 2D: 46 ML

## 2022-07-22 PROCEDURE — 93306 TTE W/DOPPLER COMPLETE: CPT

## 2022-07-22 PROCEDURE — 93306 TTE W/DOPPLER COMPLETE: CPT | Performed by: INTERNAL MEDICINE

## 2022-07-26 NOTE — PROGRESS NOTES
Cardiology Follow Up    [facility] [place]  1965  [id number]  [address] [facility] [place] [address] [id number] [facility]  [phone]    1  Pericardial effusion  Sedimentation rate, automated    C-reactive protein    Echo follow up/limited w/ contrast if indicated    DISCONTINUED: colchicine (COLCRYS) 0 6 mg tablet    CANCELED: Echo follow up/limited w/ contrast if indicated   2  Hypertension, unspecified type  furosemide (LASIX) 20 mg tablet   3  Mixed hyperlipidemia     4  Hx of heart artery stent         Interval History:   Mr Bladimir Jama presented to [facility] ED on 7/19 - 7/20/22 with pericardial effusion  1 week after surgery began to experience upper back pain located between his shoulder blades so she with fatigue  He is taking Tylenol without relief  He began to experience sensation of feeling like his chest was filling up which was constant and associated with lightheadedness  He admitted to increased lower extremity swelling and a dry cough  Fever 100  Bedside TTE showed parasternal long axis parasternal short axis apical 4 chamber subcostal wife voiced views obtained  Normal EF no regional wall motion abnormality  Moderate size pericardial effusion  No evidence of tamponade  ED doctor spoke with Dr Christina Cisse Cardiology regarding finding recommended admission, Mr Ashley Dodge refused  NT proBNP 613  WBC 11 81 EKG normal sinus rhythm  Mr Bladimir Jama presents to our office for a recent ED follow up visit  He admits to pain in bones in upper chest and muscles in upper back  Geozif feels like his chest is full  He admits to shortness of breath with pain, not with walking  He admits to HA, dizziness with pain  He has been experiencing gaston LE edema worse at night  He is not forthcoming about his diet although he denies eating foods high in sodium   According to [facility] he did not refuse admission to the hospital         Medical History   NSTEMI 12/21 sp PCI to LAD  1/13/22 sp CABG x 1 PRASAD to LAD by Dr Isabela Fortune  Hypertension  Hyperlipidemia 12/27/21n , , HDL 31, LDL 63  VT  DDD  Arthritis  Anxiety  Chronic Pain Syndrome       7/22/22 TTE:  Left ventricle cavity size normal wall thickness normal LVEF 23% systolic function normal diastolic function mildly abnormal consistent with grade abnormal relaxation  Right ventricular cavity size normal systolic function normal   Left atrium normal in size right atrium normal in size  Aortic valve trileaflet no significant regurgitation or stenosis  No significant mitral valve or tricuspid valve stenosis  Moderate pericardial effusion circumferential to the heart no echocardiographic evidence of tamponade    Patient Active Problem List   Diagnosis    Primary hypertension    Old myocardial infarct    Anxiety with depression    Coronary artery disease involving native coronary artery of native heart without angina pectoris    Anxiety    Chest pain with high risk for cardiac etiology    Mixed hyperlipidemia    Adenopathy    Episodic cluster headache, not intractable    Chronic pain syndrome    NSTEMI (non-ST elevated myocardial infarction) ([address] )    S/P CABG x 1    Leukocytosis     Past Medical History:   Diagnosis Date    Angina pectoris [facility] [address] [facility]     Anxiety     Arthritis     Back pain     CAD (coronary artery disease)     DDD (degenerative disc disease), lumbosacral     Depressed     Full dentures     GERD (gastroesophageal reflux disease)     Hyperlipidemia     Hypertension     MI (myocardial infarction) ([facility] [address] )     2009    Wears glasses      Social History     Socioeconomic History    Marital status: /Civil Union     Spouse name: Not on file    Number of children: Not on file    Years of education: Not on file    Highest education level: Not on file   Occupational History    Not on file   Tobacco Use    Smoking status: Former Smoker     Quit date: 2008     Years since quittin 6    Smokeless tobacco: Never Used   Vaping Use    Vaping Use: Never used   Substance and Sexual Activity    Alcohol use: Never     Alcohol/week: 0 0 standard drinks    Drug use: No    Sexual activity: Not on file   Other Topics Concern    Not on file   Social History Narrative    Not on file     Social Determinants of Health     Financial Resource Strain: Not on file   Food Insecurity: No Food Insecurity    Worried About Running Out of Food in the Last Year: Never true    Vinnie of Food in the Last Year: Never true   Transportation Needs: No Transportation Needs    Lack of Transportation (Medical): No    Lack of Transportation (Non-Medical): No   Physical Activity: Not on file   Stress: Not on file   Social Connections: Not on file   Intimate Partner Violence: Not on file   Housing Stability: Low Risk     Unable to Pay for Housing in the Last Year: No    Number of Places Lived in the Last Year: 1    Unstable Housing in the Last Year: No      Family History   Problem Relation Age of Onset    Heart attack Father     Heart disease Brother      Past Surgical History:   Procedure Laterality Date    ANGIOPLASTY       LAD    CARDIAC CATHETERIZATION      CARDIAC CATHETERIZATION N/A 2021    Procedure: Cardiac catheterization;  Surgeon: Robson De Anda MD;  [facility]: [facility]; Service: Cardiology    CARDIAC CATHETERIZATION N/A 2021    Procedure: Cardiac Coronary Angiogram;  Surgeon: Robson De Anda MD;  [facility]; Service: Cardiology    CARDIAC CATHETERIZATION N/A 2021    Procedure: Cardiac pci;  Surgeon: Robson De Anda MD;  Location: [facility];   Service: Cardiology    NASAL SINUS SURGERY      TX CABG, ARTERY-VEIN, SINGLE N/A 2022    Procedure: CORONARY ARTERY BYPASS GRAFT (CABG) x1- LIMA to LAD;  Surgeon: Lina Aguilar MD;  [facility];  Service: Cardiac Surgery    DC ECHO TRANSESOPHAG MONTR CARDIAC PUMP FUNCTJ N/A 1/13/2022    Procedure: TRANSESOPHAGEAL ECHOCARDIOGRAM (ELADIA); Surgeon: Chriss Funes MD;  [facility];  Service: Cardiac Surgery    DC REMOVE ARMPIT LYMPH NODES Abdoul3 Kesha Kent Right 11/21/2018    Procedure: EXCISION BIOPSY LYMPH NODE AXILLARY RIGHT;  Surgeon: Christina Ayala MD;  Location: [facility];  Service: General       Current Outpatient Medications:     acetaminophen (TYLENOL) 500 mg tablet, Take 500 mg by mouth every 6 (six) hours as needed for mild pain For the pain around eyes, Disp: , Rfl:     atorvastatin (LIPITOR) 80 mg tablet, Take 1 tablet (80 mg total) by mouth daily, Disp: 90 tablet, Rfl: 3    clonazePAM (KlonoPIN) 1 mg tablet, TK 1/2 T PO BID PRN, Disp: , Rfl: 1    clopidogrel (PLAVIX) 75 mg tablet, TAKE 1 TABLET(75 MG) BY MOUTH DAILY, Disp: 90 tablet, Rfl: 3    ezetimibe (ZETIA) 10 mg tablet, TAKE 1 TABLET(10 MG) BY MOUTH DAILY, Disp: 90 tablet, Rfl: 3    gabapentin (NEURONTIN) 600 MG tablet, Take 600 mg by mouth 3 (three) times a day, Disp: , Rfl:     methocarbamol (ROBAXIN) 750 mg tablet, Take 750 mg by mouth every 6 (six) hours as needed for muscle spasms, Disp: , Rfl:     metoprolol tartrate (LOPRESSOR) 25 mg tablet, Take 1 tablet (25 mg total) by mouth every 12 (twelve) hours (Patient not taking: Reported on 7/19/2022), Disp: 180 tablet, Rfl: 3    mirtazapine (REMERON) 45 MG tablet, Take 45 mg by mouth daily at bedtime  , Disp: , Rfl:     omeprazole (PriLOSEC) 20 mg delayed release capsule, Take 20 mg by mouth daily in the early morning  , Disp: , Rfl:     oxyCODONE (ROXICODONE) 5 immediate release tablet, Take 1/2 to 1 tablet Q4-6 hours PRN pain  (Patient not taking: No sig reported), Disp: 30 tablet, Rfl: 0    polyethylene glycol (MIRALAX) 17 g packet, Take 17 g by mouth daily Hold for soft stools   (Patient not taking: Reported on 1/28/2022 ), Disp: 30 each, Rfl: 0   potassium chloride (K-DUR,KLOR-CON) 20 mEq tablet, Take 1 tablet (20 mEq total) by mouth daily for 10 days Unless otherwise directed  (Patient not taking: Reported on 1/28/2022 ), Disp: 10 tablet, Rfl: 1    QUEtiapine (SEROquel) 100 mg tablet, Take 100 mg by mouth daily at bedtime  , Disp: , Rfl:     torsemide (DEMADEX) 20 mg tablet, Take 1 tablet (20 mg total) by mouth daily for 10 days Unless otherwise directed   (Patient not taking: Reported on 1/28/2022 ), Disp: 10 tablet, Rfl: 1    traMADol (ULTRAM) 50 mg tablet, Take 50 mg by mouth, Disp: , Rfl:     verapamil (CALAN) 80 mg tablet, Take 1 tablet (80 mg total) by mouth every 12 (twelve) hours, Disp: 180 tablet, Rfl: 1    VIIBRYD 40 MG tablet, Take 40 mg by mouth daily with breakfast   (Patient not taking: No sig reported), Disp: , Rfl:   No Known Allergies    Labs:  Hospital Outpatient Visit on 07/22/2022   Component Date Value    LA size 07/22/2022 3 6     LVPWd 07/22/2022 1 10     Left Atrium Area-systoli* 07/22/2022 16 3     Left Atrium Area-systoli* 07/22/2022 16 9     MV E' Tissue Velocity Se* 07/22/2022 6     RA 2D Volume 07/22/2022 32 0     IVSd 07/22/2022 6 07     LV DIASTOLIC VOLUME (MOD* 62/32/1002 108     LEFT VENTRICLE SYSTOLIC * 70/71/2006 46     Left ventricular stroke * 07/22/2022 62 00     A4C EF 07/22/2022 49     LA length (A2C) 07/22/2022 4 80     LVIDd 07/22/2022 4 80     IVS 07/22/2022 1 1     LVIDS 07/22/2022 3 40     FS 07/22/2022 29     Asc Ao 07/22/2022 3     Ao root 07/22/2022 3 10     RVID d 07/22/2022 3 5     MV valve area p 1/2 meth* 07/22/2022 5 64     E wave deceleration time 07/22/2022 133     MV Peak E Arnulfo 07/22/2022 67     MV Peak A Arnulfo 07/22/2022 1 05     RAA A4C 07/22/2022 13 8     MV stenosis pressure 1/2* 07/22/2022 39     LVSV, 2D 07/22/2022 62     LV EF 07/22/2022 55    Admission on 07/19/2022, Discharged on 07/20/2022   Component Date Value    Ventricular Rate 07/19/2022 97     Atrial Rate 07/19/2022 97     AL Interval 07/19/2022 166     QRSD Interval 07/19/2022 90     QT Interval 07/19/2022 352     QTC Interval 07/19/2022 447     P Axis 07/19/2022 85     QRS Axis 07/19/2022 -47     T Wave Axis 07/19/2022 92     WBC 07/19/2022 11 81 (A)    RBC 07/19/2022 5 27     Hemoglobin 07/19/2022 16 1     Hematocrit 07/19/2022 47 1     MCV 07/19/2022 89     MCH 07/19/2022 30 6     MCHC 07/19/2022 34 2     RDW 07/19/2022 12 2     MPV 07/19/2022 10 6     Platelets 88/44/1679 232     nRBC 07/19/2022 0     Neutrophils Relative 07/19/2022 70     Immat GRANS % 07/19/2022 1     Lymphocytes Relative 07/19/2022 20     Monocytes Relative 07/19/2022 7     Eosinophils Relative 07/19/2022 2     Basophils Relative 07/19/2022 0     Neutrophils Absolute 07/19/2022 8 33 (A)    Immature Grans Absolute 07/19/2022 0 09     Lymphocytes Absolute 07/19/2022 2 33     Monocytes Absolute 07/19/2022 0 82     Eosinophils Absolute 07/19/2022 0 20     Basophils Absolute 07/19/2022 0 04     Sodium 07/19/2022 138     Potassium 07/19/2022 4 1     Chloride 07/19/2022 101     CO2 07/19/2022 27     ANION GAP 07/19/2022 10     BUN 07/19/2022 12     Creatinine 07/19/2022 1 01     Glucose 07/19/2022 98     Calcium 07/19/2022 9 0     AST 07/19/2022 16     ALT 07/19/2022 28     Alkaline Phosphatase 07/19/2022 79     Total Protein 07/19/2022 8 0     Albumin 07/19/2022 3 7     Total Bilirubin 07/19/2022 0 87     eGFR 07/19/2022 81     Protime 07/19/2022 14 5     INR 07/19/2022 1 12     PTT 07/19/2022 29     hs TnI 0hr 07/19/2022 8     NT-proBNP 07/19/2022 613 (A)    SARS-CoV-2 07/19/2022 Negative     INFLUENZA A PCR 07/19/2022 Negative     INFLUENZA B PCR 07/19/2022 Negative     RSV PCR 07/19/2022 Negative      Imaging: XR chest 1 view portable    Result Date: 7/20/2022  Narrative: CHEST INDICATION:   chest/back pain  H/o cabg   COMPARISON:  1/14/2022 EXAM PERFORMED/VIEWS:  XR CHEST PORTABLE FINDINGS: Cardiomediastinal silhouette appears unremarkable  The lungs are clear  CABG  No pneumothorax or pleural effusion  Osseous structures appear within normal limits for patient age  Impression: No acute cardiopulmonary disease  Workstation performed: Marilyn Box bedside procedure    Result Date: 7/20/2022  Narrative: 1 2 [id number] 2 323 [id number] 2    Echo complete w/ contrast if indicated    Result Date: 7/22/2022  Narrative: Savanna Flor  Left Ventricle: Left ventricular cavity size is normal  Wall thickness is normal  The left ventricular ejection fraction is 55%  Systolic function is normal  Diastolic function is mildly abnormal, consistent with grade I (abnormal) relaxation    The following segments are hypokinetic: mid anteroseptal and apical septal    Pericardium: There is a moderate pericardial effusion circumferential to the heart  There is no echocardiographic evidence of tamponade  Compared to prior echocardiographic study dated 12/27/2021, the pericardial effusion appears to be new  Wall motion abnormalities noted on current echocardiogram also seem to be present on prior study  Review of Systems:  Review of Systems   Constitutional: Positive for fatigue  Musculoskeletal: Positive for arthralgias, back pain and myalgias  Neurological: Positive for light-headedness  All other systems reviewed and are negative  Physical Exam:  Physical Exam  Vitals reviewed  Constitutional:       Appearance: Normal appearance  HENT:      Head: Normocephalic  Cardiovascular:      Rate and Rhythm: Normal rate and regular rhythm  Pulses: Normal pulses  Heart sounds: Normal heart sounds  Pulmonary:      Effort: Pulmonary effort is normal       Breath sounds: Normal breath sounds  Abdominal:      General: Bowel sounds are normal       Palpations: Abdomen is soft  Musculoskeletal:         General: Normal range of motion        Cervical back: Normal range of motion and neck supple  Right lower leg: No edema  Left lower leg: No edema  Skin:     General: Skin is warm and dry  Capillary Refill: Capillary refill takes less than 2 seconds  Neurological:      General: No focal deficit present  Mental Status: He is alert and oriented to person, place, and time  Psychiatric:         Mood and Affect: Mood normal          Behavior: Behavior normal          Discussion/Summary:  1  Moderate pericardial effusion without tamponade  He denies persistent pain or shortness of breath  He occasionally experiences clavicle pain and lopez upper back muscle pain associated with shortness of breath, Not persistent  Etiology unclear, hx of CABG x 1 LIMA to LAD on 1/13/22, CRP and ESR to be done in near  future  Lasix 20mg one dose today for trace to 1 + Lopez LE edema  Denies high sodium diet  Follow up Limited TTE ASAP to evaluate pericardial effusion to ensure not enlarging or tamponade  Corry is aware to present to the ED for worsening symptoms of CP, SOB, lightheadedness or dizziness  Continue on verapamil 80 mg q 12  2  Hypertension controlled on  Verapamil 80mg BID, 2gm sodium diet   3  Hyperlipidemia 12/27/21n , , HDL 31, LDL 63 continue on  Lipitor 80 mg daily, Zetia 10 mg daily  4   Hx of 12/21 sp PCI to LAD  Continue Plavix 75 mg daily, Lipitor 80 mg daily, Zetia 10 mg daily verapamil 80 mg q 12 hours

## 2022-07-27 ENCOUNTER — OFFICE VISIT (OUTPATIENT)
Dept: CARDIOLOGY CLINIC | Facility: CLINIC | Age: 57
End: 2022-07-27
Payer: MEDICARE

## 2022-07-27 VITALS
BODY MASS INDEX: 22.82 KG/M2 | HEIGHT: 70 IN | HEART RATE: 86 BPM | SYSTOLIC BLOOD PRESSURE: 126 MMHG | WEIGHT: 159.4 LBS | OXYGEN SATURATION: 99 % | DIASTOLIC BLOOD PRESSURE: 80 MMHG

## 2022-07-27 DIAGNOSIS — I31.39 PERICARDIAL EFFUSION: Primary | ICD-10-CM

## 2022-07-27 DIAGNOSIS — I10 HYPERTENSION, UNSPECIFIED TYPE: ICD-10-CM

## 2022-07-27 DIAGNOSIS — E78.2 MIXED HYPERLIPIDEMIA: ICD-10-CM

## 2022-07-27 DIAGNOSIS — Z95.5 HX OF HEART ARTERY STENT: ICD-10-CM

## 2022-07-27 PROCEDURE — 99215 OFFICE O/P EST HI 40 MIN: CPT | Performed by: INTERNAL MEDICINE

## 2022-07-27 RX ORDER — COLCHICINE 0.6 MG/1
0.6 TABLET ORAL DAILY
Qty: 30 TABLET | Refills: 0 | Status: SHIPPED | OUTPATIENT
Start: 2022-07-27 | End: 2022-07-27 | Stop reason: CLARIF

## 2022-07-27 RX ORDER — FUROSEMIDE 20 MG/1
TABLET ORAL
Qty: 15 TABLET | Refills: 1 | Status: SHIPPED | OUTPATIENT
Start: 2022-07-27

## 2022-07-27 NOTE — PATIENT INSTRUCTIONS
Low sodium 10% per serving       Lasix 20mg daily today and when needed for LE edema  Limited 2 D echocardiogram   Lab studies ESR and CRP

## 2022-07-28 DIAGNOSIS — I31.39 PERICARDIAL EFFUSION: Primary | ICD-10-CM

## 2022-07-28 RX ORDER — COLCHICINE 0.6 MG/1
0.6 TABLET ORAL DAILY
Qty: 30 TABLET | Refills: 3 | Status: SHIPPED | OUTPATIENT
Start: 2022-07-28 | End: 2022-07-29 | Stop reason: SDUPTHER

## 2022-07-28 NOTE — PROGRESS NOTES
Mr Justin Reyna was seen in our office yesterday  CRP ordered results today 17 4  Colchicine 0 6mg daily for 3 months  Follow up CK and CRP in 3 weeks  Corry was called and notified a prescription was sent to his pharmacy

## 2022-07-29 ENCOUNTER — TELEPHONE (OUTPATIENT)
Dept: CARDIOLOGY CLINIC | Facility: CLINIC | Age: 57
End: 2022-07-29

## 2022-07-29 DIAGNOSIS — I31.39 PERICARDIAL EFFUSION: ICD-10-CM

## 2022-07-29 RX ORDER — COLCHICINE 0.6 MG/1
0.6 TABLET ORAL DAILY
Qty: 90 TABLET | Refills: 3 | Status: SHIPPED | OUTPATIENT
Start: 2022-07-29

## 2022-07-29 NOTE — TELEPHONE ENCOUNTER
Spoke with pt, he said the pharmacy told him the colchicine interacts with the verapamil  Spoke with pharmacy and they stated the colchicine with increase the the levels of verapamil in the body      Taking Colchicine 0 6 mg qd               Verapamil 80 mg bid    Please advise

## 2022-07-29 NOTE — TELEPHONE ENCOUNTER
----- Message from Caitlyn Handy, [address] sent at 7/28/2022  6:13 PM EDT -----  Regarding: phone call  Please call mr Calix and inform him to undergo lab studies in 3 week  CK and CRP  Ensure he has started colchicine 0 6mg daily   Thank you Deirdre Alba

## 2022-07-29 NOTE — TELEPHONE ENCOUNTER
Son called questioning the colcrys which according to the chart was discontinued  Told him note states D/c but to continuue verapamil,lipitor and zetia and plavix he understood

## 2022-08-05 ENCOUNTER — HOSPITAL ENCOUNTER (OUTPATIENT)
Dept: NON INVASIVE DIAGNOSTICS | Facility: HOSPITAL | Age: 57
Discharge: HOME | End: 2022-08-05
Payer: MEDICARE

## 2022-08-05 VITALS
WEIGHT: 159 LBS | DIASTOLIC BLOOD PRESSURE: 80 MMHG | SYSTOLIC BLOOD PRESSURE: 126 MMHG | BODY MASS INDEX: 22.76 KG/M2 | HEART RATE: 76 BPM | HEIGHT: 70 IN

## 2022-08-05 DIAGNOSIS — I31.39 PERICARDIAL EFFUSION: ICD-10-CM

## 2022-08-05 LAB
APICAL FOUR CHAMBER EJECTION FRACTION: 52 %
E WAVE DECELERATION TIME: 173 MS
FRACTIONAL SHORTENING: 30 % (ref 28–44)
INTERVENTRICULAR SEPTUM IN DIASTOLE (PARASTERNAL SHORT AXIS VIEW): 1.1 CM
INTERVENTRICULAR SEPTUM: 1.1 CM (ref 0.6–1.1)
LEFT INTERNAL DIMENSION IN SYSTOLE: 3.3 CM (ref 2.1–4)
LEFT VENTRICULAR INTERNAL DIMENSION IN DIASTOLE: 4.7 CM (ref 3.5–6)
LEFT VENTRICULAR POSTERIOR WALL IN END DIASTOLE: 0.9 CM
LEFT VENTRICULAR STROKE VOLUME: 59 ML
LVSV (TEICH): 59 ML
MV PEAK A VEL: 0.93 M/S
MV PEAK E VEL: 53 CM/S
MV STENOSIS PRESSURE HALF TIME: 50 MS
MV VALVE AREA P 1/2 METHOD: 4.4 CM2
RA PRESSURE ESTIMATED: 3 MMHG
RV PSP: 19 MMHG
SL CV LV EF: 50
SL CV PED ECHO LEFT VENTRICLE DIASTOLIC VOLUME (MOD BIPLANE) 2D: 104 ML
SL CV PED ECHO LEFT VENTRICLE SYSTOLIC VOLUME (MOD BIPLANE) 2D: 45 ML
TR MAX PG: 16 MMHG
TR PEAK VELOCITY: 2 M/S
TRICUSPID VALVE PEAK REGURGITATION VELOCITY: 2.01 M/S

## 2022-08-05 PROCEDURE — 93308 TTE F-UP OR LMTD: CPT

## 2022-08-05 PROCEDURE — 93321 DOPPLER ECHO F-UP/LMTD STD: CPT

## 2022-08-05 PROCEDURE — 93325 DOPPLER ECHO COLOR FLOW MAPG: CPT

## 2022-08-08 ENCOUNTER — TELEPHONE (OUTPATIENT)
Dept: CARDIOLOGY CLINIC | Facility: CLINIC | Age: 57
End: 2022-08-08

## 2022-08-08 NOTE — TELEPHONE ENCOUNTER
Son called for father but would like results called to patient about recent echo he had completed  Pt would like results

## 2024-12-18 NOTE — ADDENDUM NOTE
"Giorgio Garcia" Reyes was seen and treated in our emergency department on 12/18/2024.  He may return to work on 12/19/2024.       If you have any questions or concerns, please don't hesitate to call.       RN    " Addended by: Jeffrey Rodriguez on: 3/31/2022 08:05 AM     Modules accepted: Orders

## (undated) DEVICE — SUT VICRYL 2-0 REEL 54 IN J286G

## (undated) DEVICE — Device: Brand: RETRACT-O-TAPE 18G X 30.5CM BLUNT NEEDLE

## (undated) DEVICE — INTENDED FOR TISSUE SEPARATION, AND OTHER PROCEDURES THAT REQUIRE A SHARP SURGICAL BLADE TO PUNCTURE OR CUT.: Brand: BARD-PARKER ® CARBON RIB-BACK BLADES

## (undated) DEVICE — JP PERF DRN SIL FLT 10MM FULL: Brand: CARDINAL HEALTH

## (undated) DEVICE — OASIS DRAIN, DUAL, IN-LINE, ATS COMPATIBLE: Brand: OASIS

## (undated) DEVICE — CULTURE TUBE AEROBIC

## (undated) DEVICE — SYRINGE 50ML LL

## (undated) DEVICE — GLOVE INDICATOR PI UNDERGLOVE SZ 8 BLUE

## (undated) DEVICE — STRETCH BANDAGE: Brand: CURITY

## (undated) DEVICE — SUT MONOCRYL 4-0 PS-2 27 IN Y426H

## (undated) DEVICE — FILTER SMOKE EVAC VIROSAFE

## (undated) DEVICE — DRESSING ALLEVYN LIFE HEEL 25 X 25.2CM

## (undated) DEVICE — CATH GUIDE LAUNCHER 6FR EBU 3.5

## (undated) DEVICE — SUT PROLENE 7-0 BV-1/BV-1 24 IN 8304H

## (undated) DEVICE — PLEDGET CARDIO PTFE 9.5 X 4.8 SOFT LF (6EA/PK)

## (undated) DEVICE — BONE WAX WHITE: Brand: BONE WAX WHITE

## (undated) DEVICE — ANTIBACTERIAL UNDYED BRAIDED (POLYGLACTIN 910), SYNTHETIC ABSORBABLE SUTURE: Brand: COATED VICRYL

## (undated) DEVICE — SUT MONOCRYL 4-0 PS-2 18 IN Y496G

## (undated) DEVICE — SUT VICRYL 3-0 SH 27 IN J416H

## (undated) DEVICE — THERMOFLECT BLANKET, L, 25EA                               TS THERMOFLECT BLANKET, 48" X 84", SILVER, 5/BG, 5 BG/CS NW: Brand: THERMOFLECT

## (undated) DEVICE — SCD SEQUENTIAL COMPRESSION COMFORT SLEEVE MEDIUM KNEE LENGTH: Brand: KENDALL SCD

## (undated) DEVICE — CULTURE TUBE ANAEROBIC

## (undated) DEVICE — 40601 PROLONGED POSITIONING SYSTEM: Brand: 40601 PROLONGED POSITIONING SYSTEM

## (undated) DEVICE — PACK CABG PBDS

## (undated) DEVICE — TELFA NON-ADHERENT ABSORBENT DRESSING: Brand: TELFA

## (undated) DEVICE — INTENDED FOR TISSUE SEPARATION, AND OTHER PROCEDURES THAT REQUIRE A SHARP SURGICAL BLADE TO PUNCTURE OR CUT.: Brand: BARD-PARKER SAFETY BLADES SIZE 15, STERILE

## (undated) DEVICE — GUIDEWIRE WHOLEY HI TORQUE INTERM MOD J .035 145CM

## (undated) DEVICE — SPECIMEN CONTAINER STERILE PEEL PACK

## (undated) DEVICE — ALCON OPHTHALMIC KNIFE 15 °: Brand: ALCON

## (undated) DEVICE — GAUZE SPONGES,16 PLY: Brand: CURITY

## (undated) DEVICE — BETHLEHEM UNIVERSAL LAPAROTOMY: Brand: CARDINAL HEALTH

## (undated) DEVICE — ACE WRAP 6 IN UNSTERILE

## (undated) DEVICE — CATH STRAIGHT RED RUBBER 20 FR

## (undated) DEVICE — SUT SILK 0 CT-1 30 IN 424H

## (undated) DEVICE — 32 FR RIGHT ANGLE – SOFT PVC CATHETER: Brand: PVC THORACIC CATHETERS

## (undated) DEVICE — RECIP.STERNUM SAW BLADE 34/7.5/0.7MM: Brand: AESCULAP

## (undated) DEVICE — TR BAND RADIAL ARTERY COMPRESSION DEVICE: Brand: TR BAND

## (undated) DEVICE — SUT ETHIBOND 2-0 SH-1/SH-1 30 IN X763H

## (undated) DEVICE — VASOVIEW HEMOPRO 2: Brand: VASOVIEW HEMOPRO 2

## (undated) DEVICE — 32 FR STRAIGHT – SOFT PVC CATHETER: Brand: PVC THORACIC CATHETERS

## (undated) DEVICE — LIGACLIP MCA MULTIPLE CLIP APPLIERS, 20 SMALL CLIPS: Brand: LIGACLIP

## (undated) DEVICE — EVERGRIP INSERT SET 86MM: Brand: FOGARTY EVERGRIP

## (undated) DEVICE — HEMOCLIP CARTRIDGE LRG

## (undated) DEVICE — SUT MONOCRYL PLUS 3-0 PS-2 27 IN MCP427H

## (undated) DEVICE — 2963 MEDIPORE SOFT CLOTH TAPE 3 IN X 10 YD 12 RLS/CS: Brand: 3M™ MEDIPORE™

## (undated) DEVICE — LIGHT HANDLE COVER SLEEVE DISP BLUE STELLAR

## (undated) DEVICE — SUT SILK 2 60 IN SA8H

## (undated) DEVICE — SUCTION CATH 18 FR

## (undated) DEVICE — NC TREK CORONARY DILATATION CATHETER 3.0 MM X 15 MM / RAPID-EXCHANGE: Brand: NC TREK

## (undated) DEVICE — TREK CORONARY DILATATION CATHETER 2.50 MM X 15 MM / RAPID-EXCHANGE: Brand: TREK

## (undated) DEVICE — SUT ETHIBOND 2-0 SH/SH 36 IN X523H

## (undated) DEVICE — SILVER-COATED ANTIBACTERIAL BARRIER DRESSING: Brand: ACTICOAT SURGIC 10X12CM 5PK US

## (undated) DEVICE — SUT PROLENE 5-0 C-1/C-1 36 IN 8321H

## (undated) DEVICE — SUT SILK 2-0 SH CR/8 18 IN C012D

## (undated) DEVICE — ELECTRODE EZ CLEAN BLADE -0012

## (undated) DEVICE — GLOVE SRG BIOGEL 6.5

## (undated) DEVICE — CHLORAPREP HI-LITE 26ML ORANGE

## (undated) DEVICE — RADIFOCUS OPTITORQUE ANGIOGRAPHIC CATHETER: Brand: OPTITORQUE

## (undated) DEVICE — TUBING INSUFFLATION SET ISO CONNECTOR

## (undated) DEVICE — PENCIL ELECTROSURG E-Z CLEAN -0035H

## (undated) DEVICE — ELECTRODE BLADE E-Z CLEAN 4IN -0014A

## (undated) DEVICE — STERNAL WIRE

## (undated) DEVICE — BLANKET HYPOTHERMIA ADULT GAYMAR

## (undated) DEVICE — GLIDESHEATH SLENDER STAINLESS STEEL KIT: Brand: GLIDESHEATH SLENDER

## (undated) DEVICE — SILVER-COATED ANTIBACTERIAL BARRIER DRESSING: Brand: ACTICOAT SURGIC 10X25CM 5PK US

## (undated) DEVICE — GLOVE INDICATOR PI UNDERGLOVE SZ 7 BLUE

## (undated) DEVICE — PLUMEPEN PRO 10FT

## (undated) DEVICE — GLOVE SRG BIOGEL 7

## (undated) DEVICE — GLOVE SRG BIOGEL ECLIPSE 8

## (undated) DEVICE — SUT VICRYL PLUS 1 CTB-1 36 IN VCPB947H

## (undated) DEVICE — TRAY FOLEY 16FR SURESTEP TEMP SENS URIMETER STAT LOK

## (undated) DEVICE — SUT PROLENE 4-0 BB 36 IN 8581H

## (undated) DEVICE — SUT PDS PLUS 1 CTB 36 IN PDPB359T

## (undated) DEVICE — MEDI-VAC YANKAUER SUCTION HANDLE W/BULBOUS AND CONTROL VENT: Brand: CARDINAL HEALTH

## (undated) DEVICE — SUT PROLENE 7-0 BV175-8/BV175-8 24 IN EPM8747

## (undated) DEVICE — 3000CC GUARDIAN II: Brand: GUARDIAN

## (undated) DEVICE — RUNTHROUGH NS EXTRA FLOPPY PTCA GUIDEWIRE: Brand: RUNTHROUGH

## (undated) DEVICE — BLADE BEAVER MINI SZ 69

## (undated) DEVICE — ADHESIVE SKIN HIGH VISCOSITY EXOFIN 1ML

## (undated) DEVICE — GLOVE INDICATOR PI UNDERGLOVE SZ 6.5 BLUE

## (undated) DEVICE — SUT ETHILON 2-0 FS 18 IN 664H

## (undated) DEVICE — 3M™ STERI-STRIP™ REINFORCED ADHESIVE SKIN CLOSURES, R1547, 1/2 IN X 4 IN (12 MM X 100 MM), 6 STRIPS/ENVELOPE: Brand: 3M™ STERI-STRIP™